# Patient Record
Sex: FEMALE | Race: WHITE | NOT HISPANIC OR LATINO | Employment: FULL TIME | URBAN - METROPOLITAN AREA
[De-identification: names, ages, dates, MRNs, and addresses within clinical notes are randomized per-mention and may not be internally consistent; named-entity substitution may affect disease eponyms.]

---

## 2020-01-20 ENCOUNTER — COMMUNICATION - HEALTHEAST (OUTPATIENT)
Dept: FAMILY MEDICINE | Facility: CLINIC | Age: 40
End: 2020-01-20

## 2020-01-20 ENCOUNTER — OFFICE VISIT - HEALTHEAST (OUTPATIENT)
Dept: FAMILY MEDICINE | Facility: CLINIC | Age: 40
End: 2020-01-20

## 2020-01-20 DIAGNOSIS — Z13.21 ENCOUNTER FOR VITAMIN DEFICIENCY SCREENING: ICD-10-CM

## 2020-01-20 DIAGNOSIS — Z00.00 ROUTINE GENERAL MEDICAL EXAMINATION AT A HEALTH CARE FACILITY: ICD-10-CM

## 2020-01-20 DIAGNOSIS — Z13.220 SCREENING FOR CHOLESTEROL LEVEL: ICD-10-CM

## 2020-01-20 DIAGNOSIS — Z13.0 SCREENING FOR DEFICIENCY ANEMIA: ICD-10-CM

## 2020-01-20 DIAGNOSIS — G43.009 MIGRAINE WITHOUT AURA AND WITHOUT STATUS MIGRAINOSUS, NOT INTRACTABLE: ICD-10-CM

## 2020-01-20 DIAGNOSIS — R74.8 ELEVATED LIVER ENZYMES: ICD-10-CM

## 2020-01-20 DIAGNOSIS — Z13.1 SCREENING FOR DIABETES MELLITUS: ICD-10-CM

## 2020-01-20 DIAGNOSIS — D58.2 ELEVATED HEMOGLOBIN (H): ICD-10-CM

## 2020-01-20 DIAGNOSIS — Z12.4 SCREENING FOR CERVICAL CANCER: ICD-10-CM

## 2020-01-20 DIAGNOSIS — E55.9 VITAMIN D DEFICIENCY: ICD-10-CM

## 2020-01-20 DIAGNOSIS — Z11.3 SCREEN FOR STD (SEXUALLY TRANSMITTED DISEASE): ICD-10-CM

## 2020-01-20 DIAGNOSIS — F33.0 MILD RECURRENT MAJOR DEPRESSION (H): ICD-10-CM

## 2020-01-20 DIAGNOSIS — F41.1 GAD (GENERALIZED ANXIETY DISORDER): ICD-10-CM

## 2020-01-20 DIAGNOSIS — E28.2 PCOS (POLYCYSTIC OVARIAN SYNDROME): ICD-10-CM

## 2020-01-20 LAB
ALBUMIN SERPL-MCNC: 3.9 G/DL (ref 3.5–5)
ALP SERPL-CCNC: 180 U/L (ref 45–120)
ALT SERPL W P-5'-P-CCNC: 178 U/L (ref 0–45)
ANION GAP SERPL CALCULATED.3IONS-SCNC: 10 MMOL/L (ref 5–18)
AST SERPL W P-5'-P-CCNC: 83 U/L (ref 0–40)
BILIRUB SERPL-MCNC: 0.5 MG/DL (ref 0–1)
BUN SERPL-MCNC: 8 MG/DL (ref 8–22)
CALCIUM SERPL-MCNC: 9.3 MG/DL (ref 8.5–10.5)
CHLORIDE BLD-SCNC: 103 MMOL/L (ref 98–107)
CHOLEST SERPL-MCNC: 187 MG/DL
CO2 SERPL-SCNC: 26 MMOL/L (ref 22–31)
CREAT SERPL-MCNC: 0.71 MG/DL (ref 0.6–1.1)
ERYTHROCYTE [DISTWIDTH] IN BLOOD BY AUTOMATED COUNT: 12.4 % (ref 11–14.5)
FASTING STATUS PATIENT QL REPORTED: ABNORMAL
FERRITIN SERPL-MCNC: 86 NG/ML (ref 10–130)
GFR SERPL CREATININE-BSD FRML MDRD: >60 ML/MIN/1.73M2
GLUCOSE BLD-MCNC: 340 MG/DL (ref 70–125)
HBA1C MFR BLD: 12.6 % (ref 3.5–6)
HCT VFR BLD AUTO: 48.6 % (ref 35–47)
HDLC SERPL-MCNC: 38 MG/DL
HGB BLD-MCNC: 16.1 G/DL (ref 12–16)
IRON SATN MFR SERPL: 21 % (ref 20–50)
IRON SERPL-MCNC: 82 UG/DL (ref 42–175)
LDLC SERPL CALC-MCNC: 108 MG/DL
MCH RBC QN AUTO: 28.7 PG (ref 27–34)
MCHC RBC AUTO-ENTMCNC: 33.1 G/DL (ref 32–36)
MCV RBC AUTO: 87 FL (ref 80–100)
PLATELET # BLD AUTO: 311 THOU/UL (ref 140–440)
PMV BLD AUTO: 7.2 FL (ref 7–10)
POTASSIUM BLD-SCNC: 4.2 MMOL/L (ref 3.5–5)
PROT SERPL-MCNC: 7.2 G/DL (ref 6–8)
RBC # BLD AUTO: 5.61 MILL/UL (ref 3.8–5.4)
SODIUM SERPL-SCNC: 139 MMOL/L (ref 136–145)
TIBC SERPL-MCNC: 397 UG/DL (ref 313–563)
TRANSFERRIN SERPL-MCNC: 318 MG/DL (ref 212–360)
TRIGL SERPL-MCNC: 203 MG/DL
WBC: 6.4 THOU/UL (ref 4–11)

## 2020-01-20 ASSESSMENT — ANXIETY QUESTIONNAIRES
2. NOT BEING ABLE TO STOP OR CONTROL WORRYING: NEARLY EVERY DAY
6. BECOMING EASILY ANNOYED OR IRRITABLE: MORE THAN HALF THE DAYS
IF YOU CHECKED OFF ANY PROBLEMS ON THIS QUESTIONNAIRE, HOW DIFFICULT HAVE THESE PROBLEMS MADE IT FOR YOU TO DO YOUR WORK, TAKE CARE OF THINGS AT HOME, OR GET ALONG WITH OTHER PEOPLE: SOMEWHAT DIFFICULT
GAD7 TOTAL SCORE: 16
1. FEELING NERVOUS, ANXIOUS, OR ON EDGE: MORE THAN HALF THE DAYS
3. WORRYING TOO MUCH ABOUT DIFFERENT THINGS: NEARLY EVERY DAY
5. BEING SO RESTLESS THAT IT IS HARD TO SIT STILL: SEVERAL DAYS
4. TROUBLE RELAXING: MORE THAN HALF THE DAYS
7. FEELING AFRAID AS IF SOMETHING AWFUL MIGHT HAPPEN: NEARLY EVERY DAY

## 2020-01-20 ASSESSMENT — MIFFLIN-ST. JEOR: SCORE: 2191.46

## 2020-01-20 ASSESSMENT — PATIENT HEALTH QUESTIONNAIRE - PHQ9: SUM OF ALL RESPONSES TO PHQ QUESTIONS 1-9: 9

## 2020-01-21 LAB
25(OH)D3 SERPL-MCNC: 9 NG/ML (ref 30–80)
HPV SOURCE: NORMAL
HUMAN PAPILLOMA VIRUS 16 DNA: NEGATIVE
HUMAN PAPILLOMA VIRUS 18 DNA: NEGATIVE
HUMAN PAPILLOMA VIRUS FINAL DIAGNOSIS: NORMAL
HUMAN PAPILLOMA VIRUS OTHER HR: NEGATIVE
SPECIMEN DESCRIPTION: NORMAL

## 2020-01-22 ENCOUNTER — OFFICE VISIT - HEALTHEAST (OUTPATIENT)
Dept: FAMILY MEDICINE | Facility: CLINIC | Age: 40
End: 2020-01-22

## 2020-01-22 ENCOUNTER — OFFICE VISIT - HEALTHEAST (OUTPATIENT)
Dept: PHARMACY | Facility: CLINIC | Age: 40
End: 2020-01-22

## 2020-01-22 DIAGNOSIS — D58.2 ELEVATED HEMOGLOBIN (H): ICD-10-CM

## 2020-01-22 DIAGNOSIS — E11.9 TYPE 2 DIABETES MELLITUS WITHOUT COMPLICATION, WITHOUT LONG-TERM CURRENT USE OF INSULIN (H): ICD-10-CM

## 2020-01-22 DIAGNOSIS — R79.89 ELEVATED LFTS: ICD-10-CM

## 2020-01-22 DIAGNOSIS — E11.9 DIABETES MELLITUS, TYPE 2 (H): ICD-10-CM

## 2020-01-22 DIAGNOSIS — E28.2 PCOS (POLYCYSTIC OVARIAN SYNDROME): ICD-10-CM

## 2020-01-22 DIAGNOSIS — Z11.3 SCREEN FOR STD (SEXUALLY TRANSMITTED DISEASE): ICD-10-CM

## 2020-01-22 LAB
CREAT UR-MCNC: 84.1 MG/DL
HAV IGM SERPL QL IA: NEGATIVE
HBV CORE IGM SERPL QL IA: NEGATIVE
HBV SURFACE AG SERPL QL IA: NEGATIVE
HCV AB SERPL QL IA: NEGATIVE
MICROALBUMIN UR-MCNC: 0.98 MG/DL (ref 0–1.99)
MICROALBUMIN/CREAT UR: 11.7 MG/G

## 2020-01-23 LAB
C TRACH DNA SPEC QL PROBE+SIG AMP: NEGATIVE
N GONORRHOEA DNA SPEC QL NAA+PROBE: NEGATIVE

## 2020-01-27 ENCOUNTER — COMMUNICATION - HEALTHEAST (OUTPATIENT)
Dept: FAMILY MEDICINE | Facility: CLINIC | Age: 40
End: 2020-01-27

## 2020-01-28 LAB
BKR LAB AP ABNORMAL BLEEDING: YES
BKR LAB AP BIRTH CONTROL/HORMONES: NORMAL
BKR LAB AP CERVICAL APPEARANCE: NORMAL
BKR LAB AP GYN ADEQUACY: NORMAL
BKR LAB AP GYN INTERPRETATION: NORMAL
BKR LAB AP HPV REFLEX: NORMAL
BKR LAB AP LMP: NORMAL
BKR LAB AP PATIENT STATUS: NORMAL
BKR LAB AP PREVIOUS ABNORMAL: NORMAL
BKR LAB AP PREVIOUS NORMAL: NORMAL
HIGH RISK?: NO
PATH REPORT.COMMENTS IMP SPEC: NORMAL
RESULT FLAG (HE HISTORICAL CONVERSION): NORMAL

## 2020-01-31 ENCOUNTER — COMMUNICATION - HEALTHEAST (OUTPATIENT)
Dept: FAMILY MEDICINE | Facility: CLINIC | Age: 40
End: 2020-01-31

## 2020-01-31 DIAGNOSIS — F41.1 GAD (GENERALIZED ANXIETY DISORDER): ICD-10-CM

## 2020-02-06 ENCOUNTER — OFFICE VISIT - HEALTHEAST (OUTPATIENT)
Dept: FAMILY MEDICINE | Facility: CLINIC | Age: 40
End: 2020-02-06

## 2020-02-06 ENCOUNTER — COMMUNICATION - HEALTHEAST (OUTPATIENT)
Dept: FAMILY MEDICINE | Facility: CLINIC | Age: 40
End: 2020-02-06

## 2020-02-06 ENCOUNTER — COMMUNICATION - HEALTHEAST (OUTPATIENT)
Dept: SCHEDULING | Facility: CLINIC | Age: 40
End: 2020-02-06

## 2020-02-06 DIAGNOSIS — R74.8 ELEVATED LIVER ENZYMES: ICD-10-CM

## 2020-02-06 DIAGNOSIS — N92.1 MENORRHAGIA WITH IRREGULAR CYCLE: ICD-10-CM

## 2020-02-06 LAB
ALBUMIN SERPL-MCNC: 3.7 G/DL (ref 3.5–5)
ALP SERPL-CCNC: 106 U/L (ref 45–120)
ALT SERPL W P-5'-P-CCNC: 105 U/L (ref 0–45)
ANION GAP SERPL CALCULATED.3IONS-SCNC: 11 MMOL/L (ref 5–18)
AST SERPL W P-5'-P-CCNC: 57 U/L (ref 0–40)
BASOPHILS # BLD AUTO: 0.1 THOU/UL (ref 0–0.2)
BASOPHILS NFR BLD AUTO: 1 % (ref 0–2)
BILIRUB SERPL-MCNC: 0.3 MG/DL (ref 0–1)
BUN SERPL-MCNC: 12 MG/DL (ref 8–22)
CALCIUM SERPL-MCNC: 10.1 MG/DL (ref 8.5–10.5)
CHLORIDE BLD-SCNC: 104 MMOL/L (ref 98–107)
CO2 SERPL-SCNC: 26 MMOL/L (ref 22–31)
CREAT SERPL-MCNC: 0.7 MG/DL (ref 0.6–1.1)
EOSINOPHIL # BLD AUTO: 0.4 THOU/UL (ref 0–0.4)
EOSINOPHIL NFR BLD AUTO: 4 % (ref 0–6)
ERYTHROCYTE [DISTWIDTH] IN BLOOD BY AUTOMATED COUNT: 12.7 % (ref 11–14.5)
GFR SERPL CREATININE-BSD FRML MDRD: >60 ML/MIN/1.73M2
GLUCOSE BLD-MCNC: 147 MG/DL (ref 70–125)
HCT VFR BLD AUTO: 41.1 % (ref 35–47)
HGB BLD-MCNC: 13.9 G/DL (ref 12–16)
LYMPHOCYTES # BLD AUTO: 2 THOU/UL (ref 0.8–4.4)
LYMPHOCYTES NFR BLD AUTO: 24 % (ref 20–40)
MCH RBC QN AUTO: 29.3 PG (ref 27–34)
MCHC RBC AUTO-ENTMCNC: 33.8 G/DL (ref 32–36)
MCV RBC AUTO: 87 FL (ref 80–100)
MONOCYTES # BLD AUTO: 0.5 THOU/UL (ref 0–0.9)
MONOCYTES NFR BLD AUTO: 5 % (ref 2–10)
NEUTROPHILS # BLD AUTO: 5.7 THOU/UL (ref 2–7.7)
NEUTROPHILS NFR BLD AUTO: 66 % (ref 50–70)
PLATELET # BLD AUTO: 335 THOU/UL (ref 140–440)
PMV BLD AUTO: 6.8 FL (ref 7–10)
POTASSIUM BLD-SCNC: 4.1 MMOL/L (ref 3.5–5)
PROT SERPL-MCNC: 7 G/DL (ref 6–8)
RBC # BLD AUTO: 4.74 MILL/UL (ref 3.8–5.4)
SODIUM SERPL-SCNC: 141 MMOL/L (ref 136–145)
WBC: 8.6 THOU/UL (ref 4–11)

## 2020-02-06 ASSESSMENT — ANXIETY QUESTIONNAIRES
GAD7 TOTAL SCORE: 0
4. TROUBLE RELAXING: NOT AT ALL
1. FEELING NERVOUS, ANXIOUS, OR ON EDGE: NOT AT ALL
7. FEELING AFRAID AS IF SOMETHING AWFUL MIGHT HAPPEN: NOT AT ALL
3. WORRYING TOO MUCH ABOUT DIFFERENT THINGS: NOT AT ALL
6. BECOMING EASILY ANNOYED OR IRRITABLE: NOT AT ALL
2. NOT BEING ABLE TO STOP OR CONTROL WORRYING: NOT AT ALL
5. BEING SO RESTLESS THAT IT IS HARD TO SIT STILL: NOT AT ALL

## 2020-02-06 ASSESSMENT — PATIENT HEALTH QUESTIONNAIRE - PHQ9: SUM OF ALL RESPONSES TO PHQ QUESTIONS 1-9: 5

## 2020-02-07 ENCOUNTER — COMMUNICATION - HEALTHEAST (OUTPATIENT)
Dept: NURSING | Facility: CLINIC | Age: 40
End: 2020-02-07

## 2020-02-18 ENCOUNTER — AMBULATORY - HEALTHEAST (OUTPATIENT)
Dept: EDUCATION SERVICES | Facility: CLINIC | Age: 40
End: 2020-02-18

## 2020-02-18 DIAGNOSIS — E11.9 CONTROLLED TYPE 2 DIABETES MELLITUS WITHOUT COMPLICATION, WITHOUT LONG-TERM CURRENT USE OF INSULIN (H): ICD-10-CM

## 2020-02-28 ENCOUNTER — COMMUNICATION - HEALTHEAST (OUTPATIENT)
Dept: FAMILY MEDICINE | Facility: CLINIC | Age: 40
End: 2020-02-28

## 2020-02-28 DIAGNOSIS — F41.1 GAD (GENERALIZED ANXIETY DISORDER): ICD-10-CM

## 2020-03-11 ENCOUNTER — OFFICE VISIT - HEALTHEAST (OUTPATIENT)
Dept: FAMILY MEDICINE | Facility: CLINIC | Age: 40
End: 2020-03-11

## 2020-03-11 DIAGNOSIS — Z71.85 IMMUNIZATION COUNSELING: ICD-10-CM

## 2020-03-11 DIAGNOSIS — E11.9 TYPE 2 DIABETES MELLITUS WITHOUT COMPLICATION, WITHOUT LONG-TERM CURRENT USE OF INSULIN (H): ICD-10-CM

## 2020-03-11 DIAGNOSIS — F41.1 GAD (GENERALIZED ANXIETY DISORDER): ICD-10-CM

## 2020-03-11 DIAGNOSIS — E28.2 PCOS (POLYCYSTIC OVARIAN SYNDROME): ICD-10-CM

## 2020-03-18 ENCOUNTER — AMBULATORY - HEALTHEAST (OUTPATIENT)
Dept: FAMILY MEDICINE | Facility: CLINIC | Age: 40
End: 2020-03-18

## 2020-03-18 ENCOUNTER — COMMUNICATION - HEALTHEAST (OUTPATIENT)
Dept: FAMILY MEDICINE | Facility: CLINIC | Age: 40
End: 2020-03-18

## 2020-03-26 ENCOUNTER — COMMUNICATION - HEALTHEAST (OUTPATIENT)
Dept: FAMILY MEDICINE | Facility: CLINIC | Age: 40
End: 2020-03-26

## 2020-03-26 DIAGNOSIS — F41.1 GAD (GENERALIZED ANXIETY DISORDER): ICD-10-CM

## 2020-03-26 DIAGNOSIS — E28.2 PCOS (POLYCYSTIC OVARIAN SYNDROME): ICD-10-CM

## 2020-03-26 DIAGNOSIS — E11.9 TYPE 2 DIABETES MELLITUS WITHOUT COMPLICATION, WITHOUT LONG-TERM CURRENT USE OF INSULIN (H): ICD-10-CM

## 2020-03-30 ENCOUNTER — COMMUNICATION - HEALTHEAST (OUTPATIENT)
Dept: FAMILY MEDICINE | Facility: CLINIC | Age: 40
End: 2020-03-30

## 2020-04-01 ENCOUNTER — OFFICE VISIT - HEALTHEAST (OUTPATIENT)
Dept: FAMILY MEDICINE | Facility: CLINIC | Age: 40
End: 2020-04-01

## 2020-04-01 DIAGNOSIS — F41.1 GAD (GENERALIZED ANXIETY DISORDER): ICD-10-CM

## 2020-04-01 DIAGNOSIS — E11.9 TYPE 2 DIABETES MELLITUS WITHOUT COMPLICATION, WITHOUT LONG-TERM CURRENT USE OF INSULIN (H): ICD-10-CM

## 2020-04-01 DIAGNOSIS — J45.20 MILD INTERMITTENT ASTHMA WITHOUT COMPLICATION: ICD-10-CM

## 2020-04-01 DIAGNOSIS — F33.0 MILD RECURRENT MAJOR DEPRESSION (H): ICD-10-CM

## 2020-04-01 RX ORDER — ALBUTEROL SULFATE 90 UG/1
2 AEROSOL, METERED RESPIRATORY (INHALATION) EVERY 6 HOURS PRN
Qty: 18 G | Refills: 1 | Status: SHIPPED | OUTPATIENT
Start: 2020-04-01 | End: 2022-08-31

## 2020-04-01 ASSESSMENT — ANXIETY QUESTIONNAIRES
IF YOU CHECKED OFF ANY PROBLEMS ON THIS QUESTIONNAIRE, HOW DIFFICULT HAVE THESE PROBLEMS MADE IT FOR YOU TO DO YOUR WORK, TAKE CARE OF THINGS AT HOME, OR GET ALONG WITH OTHER PEOPLE: EXTREMELY DIFFICULT
6. BECOMING EASILY ANNOYED OR IRRITABLE: NEARLY EVERY DAY
2. NOT BEING ABLE TO STOP OR CONTROL WORRYING: NEARLY EVERY DAY
GAD7 TOTAL SCORE: 19
3. WORRYING TOO MUCH ABOUT DIFFERENT THINGS: NEARLY EVERY DAY
4. TROUBLE RELAXING: NEARLY EVERY DAY
1. FEELING NERVOUS, ANXIOUS, OR ON EDGE: NEARLY EVERY DAY
7. FEELING AFRAID AS IF SOMETHING AWFUL MIGHT HAPPEN: NEARLY EVERY DAY
5. BEING SO RESTLESS THAT IT IS HARD TO SIT STILL: SEVERAL DAYS

## 2020-04-01 ASSESSMENT — PATIENT HEALTH QUESTIONNAIRE - PHQ9: SUM OF ALL RESPONSES TO PHQ QUESTIONS 1-9: 13

## 2020-04-22 ENCOUNTER — OFFICE VISIT - HEALTHEAST (OUTPATIENT)
Dept: FAMILY MEDICINE | Facility: CLINIC | Age: 40
End: 2020-04-22

## 2020-04-22 DIAGNOSIS — E11.9 TYPE 2 DIABETES MELLITUS WITHOUT COMPLICATION, WITHOUT LONG-TERM CURRENT USE OF INSULIN (H): ICD-10-CM

## 2020-04-22 DIAGNOSIS — F41.1 GAD (GENERALIZED ANXIETY DISORDER): ICD-10-CM

## 2020-04-22 ASSESSMENT — ANXIETY QUESTIONNAIRES
6. BECOMING EASILY ANNOYED OR IRRITABLE: SEVERAL DAYS
3. WORRYING TOO MUCH ABOUT DIFFERENT THINGS: SEVERAL DAYS
2. NOT BEING ABLE TO STOP OR CONTROL WORRYING: SEVERAL DAYS
1. FEELING NERVOUS, ANXIOUS, OR ON EDGE: SEVERAL DAYS
5. BEING SO RESTLESS THAT IT IS HARD TO SIT STILL: NOT AT ALL
7. FEELING AFRAID AS IF SOMETHING AWFUL MIGHT HAPPEN: SEVERAL DAYS
GAD7 TOTAL SCORE: 6
IF YOU CHECKED OFF ANY PROBLEMS ON THIS QUESTIONNAIRE, HOW DIFFICULT HAVE THESE PROBLEMS MADE IT FOR YOU TO DO YOUR WORK, TAKE CARE OF THINGS AT HOME, OR GET ALONG WITH OTHER PEOPLE: SOMEWHAT DIFFICULT
4. TROUBLE RELAXING: SEVERAL DAYS

## 2020-04-22 ASSESSMENT — PATIENT HEALTH QUESTIONNAIRE - PHQ9: SUM OF ALL RESPONSES TO PHQ QUESTIONS 1-9: 9

## 2020-05-21 ENCOUNTER — COMMUNICATION - HEALTHEAST (OUTPATIENT)
Dept: FAMILY MEDICINE | Facility: CLINIC | Age: 40
End: 2020-05-21

## 2020-05-21 DIAGNOSIS — F41.1 GAD (GENERALIZED ANXIETY DISORDER): ICD-10-CM

## 2020-06-20 ENCOUNTER — COMMUNICATION - HEALTHEAST (OUTPATIENT)
Dept: FAMILY MEDICINE | Facility: CLINIC | Age: 40
End: 2020-06-20

## 2020-06-20 DIAGNOSIS — E11.9 TYPE 2 DIABETES MELLITUS WITHOUT COMPLICATION, WITHOUT LONG-TERM CURRENT USE OF INSULIN (H): ICD-10-CM

## 2020-06-26 ENCOUNTER — COMMUNICATION - HEALTHEAST (OUTPATIENT)
Dept: FAMILY MEDICINE | Facility: CLINIC | Age: 40
End: 2020-06-26

## 2020-07-02 ENCOUNTER — OFFICE VISIT - HEALTHEAST (OUTPATIENT)
Dept: FAMILY MEDICINE | Facility: CLINIC | Age: 40
End: 2020-07-02

## 2020-07-02 DIAGNOSIS — J45.20 MILD INTERMITTENT ASTHMA WITHOUT COMPLICATION: ICD-10-CM

## 2020-07-02 DIAGNOSIS — E11.9 TYPE 2 DIABETES MELLITUS WITHOUT COMPLICATION, WITHOUT LONG-TERM CURRENT USE OF INSULIN (H): ICD-10-CM

## 2020-07-02 DIAGNOSIS — F41.1 GAD (GENERALIZED ANXIETY DISORDER): ICD-10-CM

## 2020-07-02 ASSESSMENT — ANXIETY QUESTIONNAIRES
4. TROUBLE RELAXING: SEVERAL DAYS
2. NOT BEING ABLE TO STOP OR CONTROL WORRYING: MORE THAN HALF THE DAYS
1. FEELING NERVOUS, ANXIOUS, OR ON EDGE: MORE THAN HALF THE DAYS
3. WORRYING TOO MUCH ABOUT DIFFERENT THINGS: MORE THAN HALF THE DAYS
5. BEING SO RESTLESS THAT IT IS HARD TO SIT STILL: NOT AT ALL
GAD7 TOTAL SCORE: 11
IF YOU CHECKED OFF ANY PROBLEMS ON THIS QUESTIONNAIRE, HOW DIFFICULT HAVE THESE PROBLEMS MADE IT FOR YOU TO DO YOUR WORK, TAKE CARE OF THINGS AT HOME, OR GET ALONG WITH OTHER PEOPLE: EXTREMELY DIFFICULT
6. BECOMING EASILY ANNOYED OR IRRITABLE: MORE THAN HALF THE DAYS
7. FEELING AFRAID AS IF SOMETHING AWFUL MIGHT HAPPEN: MORE THAN HALF THE DAYS

## 2020-07-02 ASSESSMENT — PATIENT HEALTH QUESTIONNAIRE - PHQ9: SUM OF ALL RESPONSES TO PHQ QUESTIONS 1-9: 13

## 2020-07-22 ENCOUNTER — COMMUNICATION - HEALTHEAST (OUTPATIENT)
Dept: FAMILY MEDICINE | Facility: CLINIC | Age: 40
End: 2020-07-22

## 2020-07-22 DIAGNOSIS — E11.9 TYPE 2 DIABETES MELLITUS WITHOUT COMPLICATION, WITHOUT LONG-TERM CURRENT USE OF INSULIN (H): ICD-10-CM

## 2020-09-20 ENCOUNTER — COMMUNICATION - HEALTHEAST (OUTPATIENT)
Dept: FAMILY MEDICINE | Facility: CLINIC | Age: 40
End: 2020-09-20

## 2020-09-20 DIAGNOSIS — F33.0 MILD RECURRENT MAJOR DEPRESSION (H): ICD-10-CM

## 2020-12-28 ENCOUNTER — COMMUNICATION - HEALTHEAST (OUTPATIENT)
Dept: FAMILY MEDICINE | Facility: CLINIC | Age: 40
End: 2020-12-28

## 2020-12-28 DIAGNOSIS — F41.1 GAD (GENERALIZED ANXIETY DISORDER): ICD-10-CM

## 2021-01-01 ENCOUNTER — COMMUNICATION - HEALTHEAST (OUTPATIENT)
Dept: FAMILY MEDICINE | Facility: CLINIC | Age: 41
End: 2021-01-01

## 2021-01-01 DIAGNOSIS — E11.9 TYPE 2 DIABETES MELLITUS WITHOUT COMPLICATION, WITHOUT LONG-TERM CURRENT USE OF INSULIN (H): ICD-10-CM

## 2021-01-03 ENCOUNTER — COMMUNICATION - HEALTHEAST (OUTPATIENT)
Dept: FAMILY MEDICINE | Facility: CLINIC | Age: 41
End: 2021-01-03

## 2021-01-03 DIAGNOSIS — E11.9 TYPE 2 DIABETES MELLITUS WITHOUT COMPLICATION, WITHOUT LONG-TERM CURRENT USE OF INSULIN (H): ICD-10-CM

## 2021-01-04 ENCOUNTER — COMMUNICATION - HEALTHEAST (OUTPATIENT)
Dept: FAMILY MEDICINE | Facility: CLINIC | Age: 41
End: 2021-01-04

## 2021-01-04 DIAGNOSIS — E11.9 TYPE 2 DIABETES MELLITUS WITHOUT COMPLICATION, WITHOUT LONG-TERM CURRENT USE OF INSULIN (H): ICD-10-CM

## 2021-01-21 ENCOUNTER — AMBULATORY - HEALTHEAST (OUTPATIENT)
Dept: FAMILY MEDICINE | Facility: CLINIC | Age: 41
End: 2021-01-21

## 2021-01-21 ENCOUNTER — OFFICE VISIT - HEALTHEAST (OUTPATIENT)
Dept: FAMILY MEDICINE | Facility: CLINIC | Age: 41
End: 2021-01-21

## 2021-01-21 DIAGNOSIS — Z31.69 PRE-CONCEPTION COUNSELING: ICD-10-CM

## 2021-01-21 DIAGNOSIS — F41.1 GAD (GENERALIZED ANXIETY DISORDER): ICD-10-CM

## 2021-01-21 DIAGNOSIS — E66.01 MORBID OBESITY (H): ICD-10-CM

## 2021-01-21 DIAGNOSIS — R74.8 ELEVATED LIVER ENZYMES: ICD-10-CM

## 2021-01-21 DIAGNOSIS — E11.9 TYPE 2 DIABETES MELLITUS WITHOUT COMPLICATION, WITHOUT LONG-TERM CURRENT USE OF INSULIN (H): ICD-10-CM

## 2021-01-21 DIAGNOSIS — F33.0 MILD RECURRENT MAJOR DEPRESSION (H): ICD-10-CM

## 2021-01-21 LAB
ALBUMIN SERPL-MCNC: 4.1 G/DL (ref 3.5–5)
ALP SERPL-CCNC: 129 U/L (ref 45–120)
ALT SERPL W P-5'-P-CCNC: 256 U/L (ref 0–45)
ANION GAP SERPL CALCULATED.3IONS-SCNC: 13 MMOL/L (ref 5–18)
AST SERPL W P-5'-P-CCNC: 109 U/L (ref 0–40)
BILIRUB SERPL-MCNC: 0.3 MG/DL (ref 0–1)
BUN SERPL-MCNC: 17 MG/DL (ref 8–22)
CALCIUM SERPL-MCNC: 9.4 MG/DL (ref 8.5–10.5)
CHLORIDE BLD-SCNC: 103 MMOL/L (ref 98–107)
CO2 SERPL-SCNC: 25 MMOL/L (ref 22–31)
CREAT SERPL-MCNC: 0.72 MG/DL (ref 0.6–1.1)
CREAT UR-MCNC: 100.9 MG/DL
GFR SERPL CREATININE-BSD FRML MDRD: >60 ML/MIN/1.73M2
GLUCOSE BLD-MCNC: 188 MG/DL (ref 70–125)
HBA1C MFR BLD: 8.6 %
MICROALBUMIN UR-MCNC: 1.36 MG/DL (ref 0–1.99)
MICROALBUMIN/CREAT UR: 13.5 MG/G
POTASSIUM BLD-SCNC: 4.7 MMOL/L (ref 3.5–5)
PROT SERPL-MCNC: 7.5 G/DL (ref 6–8)
SODIUM SERPL-SCNC: 141 MMOL/L (ref 136–145)
TSH SERPL DL<=0.005 MIU/L-ACNC: 2.96 UIU/ML (ref 0.3–5)

## 2021-01-21 RX ORDER — LANCETS 30 GAUGE
EACH MISCELLANEOUS
Qty: 100 EACH | Refills: 11 | Status: SHIPPED | OUTPATIENT
Start: 2021-01-21 | End: 2022-10-28

## 2021-01-21 ASSESSMENT — MIFFLIN-ST. JEOR: SCORE: 2198.26

## 2021-01-21 ASSESSMENT — PATIENT HEALTH QUESTIONNAIRE - PHQ9: SUM OF ALL RESPONSES TO PHQ QUESTIONS 1-9: 0

## 2021-01-27 ENCOUNTER — COMMUNICATION - HEALTHEAST (OUTPATIENT)
Dept: FAMILY MEDICINE | Facility: CLINIC | Age: 41
End: 2021-01-27

## 2021-01-27 DIAGNOSIS — E11.9 TYPE 2 DIABETES MELLITUS WITHOUT COMPLICATION, WITHOUT LONG-TERM CURRENT USE OF INSULIN (H): ICD-10-CM

## 2021-01-28 ENCOUNTER — HOSPITAL ENCOUNTER (OUTPATIENT)
Dept: ULTRASOUND IMAGING | Facility: CLINIC | Age: 41
Discharge: HOME | End: 2021-01-28
Attending: NURSE PRACTITIONER

## 2021-01-28 DIAGNOSIS — R74.8 ELEVATED LIVER ENZYMES: ICD-10-CM

## 2021-01-28 RX ORDER — GLUCOSAMINE HCL/CHONDROITIN SU 500-400 MG
CAPSULE ORAL
Qty: 100 STRIP | Refills: 11 | Status: SHIPPED | OUTPATIENT
Start: 2021-01-28 | End: 2023-04-05

## 2021-02-01 ENCOUNTER — OFFICE VISIT - HEALTHEAST (OUTPATIENT)
Dept: INTERNAL MEDICINE | Facility: CLINIC | Age: 41
End: 2021-02-01

## 2021-02-01 ENCOUNTER — COMMUNICATION - HEALTHEAST (OUTPATIENT)
Dept: FAMILY MEDICINE | Facility: CLINIC | Age: 41
End: 2021-02-01

## 2021-02-01 DIAGNOSIS — J02.9 PHARYNGITIS, UNSPECIFIED ETIOLOGY: ICD-10-CM

## 2021-02-02 ENCOUNTER — COMMUNICATION - HEALTHEAST (OUTPATIENT)
Dept: FAMILY MEDICINE | Facility: CLINIC | Age: 41
End: 2021-02-02

## 2021-02-02 DIAGNOSIS — E11.9 TYPE 2 DIABETES MELLITUS WITHOUT COMPLICATION, WITHOUT LONG-TERM CURRENT USE OF INSULIN (H): ICD-10-CM

## 2021-02-12 ENCOUNTER — COMMUNICATION - HEALTHEAST (OUTPATIENT)
Dept: FAMILY MEDICINE | Facility: CLINIC | Age: 41
End: 2021-02-12

## 2021-02-17 ENCOUNTER — COMMUNICATION - HEALTHEAST (OUTPATIENT)
Dept: INTERNAL MEDICINE | Facility: CLINIC | Age: 41
End: 2021-02-17

## 2021-02-17 ENCOUNTER — OFFICE VISIT - HEALTHEAST (OUTPATIENT)
Dept: FAMILY MEDICINE | Facility: CLINIC | Age: 41
End: 2021-02-17

## 2021-02-17 DIAGNOSIS — R21 RASH AND NONSPECIFIC SKIN ERUPTION: ICD-10-CM

## 2021-02-17 DIAGNOSIS — E66.01 MORBID OBESITY (H): ICD-10-CM

## 2021-02-17 DIAGNOSIS — K76.0 HEPATIC STEATOSIS: ICD-10-CM

## 2021-02-17 DIAGNOSIS — E11.9 TYPE 2 DIABETES MELLITUS WITHOUT COMPLICATION, WITHOUT LONG-TERM CURRENT USE OF INSULIN (H): ICD-10-CM

## 2021-02-17 RX ORDER — PHENTERMINE HYDROCHLORIDE 37.5 MG/1
37.5 TABLET ORAL
Qty: 30 TABLET | Refills: 1 | Status: SHIPPED | OUTPATIENT
Start: 2021-02-17 | End: 2022-01-10

## 2021-02-18 LAB
VARICELLA ZOSTER DNA COMMENT: NORMAL
VZV DNA SPEC QL NAA+PROBE: NORMAL
VZV PCR SPECIMEN: NORMAL

## 2021-02-19 ENCOUNTER — COMMUNICATION - HEALTHEAST (OUTPATIENT)
Dept: FAMILY MEDICINE | Facility: CLINIC | Age: 41
End: 2021-02-19

## 2021-02-19 LAB — BACTERIA SPEC CULT: NORMAL

## 2021-02-20 ENCOUNTER — COMMUNICATION - HEALTHEAST (OUTPATIENT)
Dept: FAMILY MEDICINE | Facility: CLINIC | Age: 41
End: 2021-02-20

## 2021-03-30 ENCOUNTER — COMMUNICATION - HEALTHEAST (OUTPATIENT)
Dept: FAMILY MEDICINE | Facility: CLINIC | Age: 41
End: 2021-03-30

## 2021-03-30 DIAGNOSIS — E11.9 TYPE 2 DIABETES MELLITUS WITHOUT COMPLICATION, WITHOUT LONG-TERM CURRENT USE OF INSULIN (H): ICD-10-CM

## 2021-04-01 ENCOUNTER — COMMUNICATION - HEALTHEAST (OUTPATIENT)
Dept: FAMILY MEDICINE | Facility: CLINIC | Age: 41
End: 2021-04-01

## 2021-04-01 DIAGNOSIS — E11.9 TYPE 2 DIABETES MELLITUS WITHOUT COMPLICATION, WITHOUT LONG-TERM CURRENT USE OF INSULIN (H): ICD-10-CM

## 2021-04-02 RX ORDER — INSULIN DETEMIR 100 [IU]/ML
15 INJECTION, SOLUTION SUBCUTANEOUS AT BEDTIME
Qty: 14 ML | Refills: 1 | Status: SHIPPED | OUTPATIENT
Start: 2021-04-02 | End: 2021-09-07

## 2021-04-05 ENCOUNTER — OFFICE VISIT - HEALTHEAST (OUTPATIENT)
Dept: FAMILY MEDICINE | Facility: CLINIC | Age: 41
End: 2021-04-05

## 2021-04-05 DIAGNOSIS — H66.002 NON-RECURRENT ACUTE SUPPURATIVE OTITIS MEDIA OF LEFT EAR WITHOUT SPONTANEOUS RUPTURE OF TYMPANIC MEMBRANE: ICD-10-CM

## 2021-04-05 DIAGNOSIS — H92.02 LEFT EAR PAIN: ICD-10-CM

## 2021-04-05 ASSESSMENT — MIFFLIN-ST. JEOR: SCORE: 2218.67

## 2021-04-08 ENCOUNTER — OFFICE VISIT - HEALTHEAST (OUTPATIENT)
Dept: FAMILY MEDICINE | Facility: CLINIC | Age: 41
End: 2021-04-08

## 2021-04-08 DIAGNOSIS — R51.9 ACUTE NONINTRACTABLE HEADACHE, UNSPECIFIED HEADACHE TYPE: ICD-10-CM

## 2021-04-09 ENCOUNTER — COMMUNICATION - HEALTHEAST (OUTPATIENT)
Dept: FAMILY MEDICINE | Facility: CLINIC | Age: 41
End: 2021-04-09

## 2021-04-09 DIAGNOSIS — E11.9 TYPE 2 DIABETES MELLITUS WITHOUT COMPLICATION, WITHOUT LONG-TERM CURRENT USE OF INSULIN (H): ICD-10-CM

## 2021-04-12 RX ORDER — METFORMIN HCL 500 MG
TABLET, EXTENDED RELEASE 24 HR ORAL
Qty: 360 TABLET | Refills: 2 | Status: SHIPPED | OUTPATIENT
Start: 2021-04-12 | End: 2021-12-22

## 2021-04-19 ENCOUNTER — OFFICE VISIT - HEALTHEAST (OUTPATIENT)
Dept: FAMILY MEDICINE | Facility: CLINIC | Age: 41
End: 2021-04-19

## 2021-04-19 DIAGNOSIS — H81.13 BENIGN PAROXYSMAL POSITIONAL VERTIGO DUE TO BILATERAL VESTIBULAR DISORDER: ICD-10-CM

## 2021-04-19 DIAGNOSIS — E11.9 TYPE 2 DIABETES MELLITUS WITHOUT COMPLICATION, WITHOUT LONG-TERM CURRENT USE OF INSULIN (H): ICD-10-CM

## 2021-04-19 DIAGNOSIS — H66.002 ACUTE SUPPURATIVE OTITIS MEDIA OF LEFT EAR WITHOUT SPONTANEOUS RUPTURE OF TYMPANIC MEMBRANE, RECURRENCE NOT SPECIFIED: ICD-10-CM

## 2021-04-19 RX ORDER — MECLIZINE HYDROCHLORIDE 25 MG/1
25 TABLET ORAL 3 TIMES DAILY PRN
Qty: 30 TABLET | Refills: 1 | Status: SHIPPED | OUTPATIENT
Start: 2021-04-19 | End: 2022-03-16

## 2021-04-19 ASSESSMENT — MIFFLIN-ST. JEOR: SCORE: 2223.21

## 2021-05-26 ASSESSMENT — PATIENT HEALTH QUESTIONNAIRE - PHQ9
SUM OF ALL RESPONSES TO PHQ QUESTIONS 1-9: 5
SUM OF ALL RESPONSES TO PHQ QUESTIONS 1-9: 13
SUM OF ALL RESPONSES TO PHQ QUESTIONS 1-9: 9

## 2021-05-27 ASSESSMENT — PATIENT HEALTH QUESTIONNAIRE - PHQ9
SUM OF ALL RESPONSES TO PHQ QUESTIONS 1-9: 9
SUM OF ALL RESPONSES TO PHQ QUESTIONS 1-9: 0
SUM OF ALL RESPONSES TO PHQ QUESTIONS 1-9: 13

## 2021-05-28 ASSESSMENT — ANXIETY QUESTIONNAIRES
GAD7 TOTAL SCORE: 0
GAD7 TOTAL SCORE: 19
GAD7 TOTAL SCORE: 16
GAD7 TOTAL SCORE: 6
GAD7 TOTAL SCORE: 11

## 2021-05-28 ASSESSMENT — ASTHMA QUESTIONNAIRES
ACT_TOTALSCORE: 25
ACT_TOTALSCORE: 22

## 2021-05-29 ENCOUNTER — RECORDS - HEALTHEAST (OUTPATIENT)
Dept: ADMINISTRATIVE | Facility: CLINIC | Age: 41
End: 2021-05-29

## 2021-06-02 ENCOUNTER — RECORDS - HEALTHEAST (OUTPATIENT)
Dept: ADMINISTRATIVE | Facility: CLINIC | Age: 41
End: 2021-06-02

## 2021-06-04 VITALS — BODY MASS INDEX: 47.3 KG/M2 | WEIGHT: 293 LBS

## 2021-06-04 VITALS
DIASTOLIC BLOOD PRESSURE: 82 MMHG | BODY MASS INDEX: 47.75 KG/M2 | WEIGHT: 293 LBS | HEART RATE: 80 BPM | SYSTOLIC BLOOD PRESSURE: 134 MMHG

## 2021-06-04 VITALS
HEART RATE: 82 BPM | OXYGEN SATURATION: 98 % | SYSTOLIC BLOOD PRESSURE: 130 MMHG | DIASTOLIC BLOOD PRESSURE: 80 MMHG | BODY MASS INDEX: 46.71 KG/M2 | WEIGHT: 293 LBS

## 2021-06-04 VITALS
WEIGHT: 293 LBS | HEIGHT: 69 IN | SYSTOLIC BLOOD PRESSURE: 116 MMHG | HEART RATE: 84 BPM | DIASTOLIC BLOOD PRESSURE: 84 MMHG | BODY MASS INDEX: 43.4 KG/M2

## 2021-06-04 VITALS
SYSTOLIC BLOOD PRESSURE: 128 MMHG | BODY MASS INDEX: 47.33 KG/M2 | HEART RATE: 60 BPM | DIASTOLIC BLOOD PRESSURE: 74 MMHG | WEIGHT: 293 LBS

## 2021-06-05 VITALS
WEIGHT: 293 LBS | OXYGEN SATURATION: 96 % | BODY MASS INDEX: 43.4 KG/M2 | HEART RATE: 70 BPM | SYSTOLIC BLOOD PRESSURE: 128 MMHG | HEIGHT: 69 IN | DIASTOLIC BLOOD PRESSURE: 80 MMHG

## 2021-06-05 VITALS
DIASTOLIC BLOOD PRESSURE: 70 MMHG | OXYGEN SATURATION: 97 % | SYSTOLIC BLOOD PRESSURE: 118 MMHG | WEIGHT: 293 LBS | TEMPERATURE: 98.4 F | HEART RATE: 104 BPM | BODY MASS INDEX: 47.6 KG/M2

## 2021-06-05 VITALS
DIASTOLIC BLOOD PRESSURE: 74 MMHG | HEIGHT: 69 IN | WEIGHT: 293 LBS | HEART RATE: 94 BPM | SYSTOLIC BLOOD PRESSURE: 126 MMHG | BODY MASS INDEX: 43.4 KG/M2 | OXYGEN SATURATION: 96 %

## 2021-06-05 VITALS
TEMPERATURE: 98.1 F | HEART RATE: 82 BPM | WEIGHT: 293 LBS | BODY MASS INDEX: 43.4 KG/M2 | RESPIRATION RATE: 18 BRPM | SYSTOLIC BLOOD PRESSURE: 126 MMHG | DIASTOLIC BLOOD PRESSURE: 82 MMHG | HEIGHT: 69 IN | OXYGEN SATURATION: 97 %

## 2021-06-05 VITALS
SYSTOLIC BLOOD PRESSURE: 142 MMHG | DIASTOLIC BLOOD PRESSURE: 80 MMHG | BODY MASS INDEX: 47.9 KG/M2 | HEART RATE: 75 BPM | WEIGHT: 293 LBS | OXYGEN SATURATION: 98 % | TEMPERATURE: 96.3 F

## 2021-06-05 NOTE — PROGRESS NOTES
OV   2/6/2020  Assessment & Plan:        1. Menorrhagia with irregular cycle  HM1(CBC and Differential)    HM1 (CBC with Diff)    Ambulatory referral to Obstetrics / Gynecology   2. Elevated liver enzymes  Comprehensive Metabolic Panel          We reviewed the likely/potential etiology(ies) for her heavy menstrual symptoms and we will check labs as notedand we reviewed indications for urgent evaluation. I will recheck liver and kidney functions for follow up of elevated liver enzymes. I will send a referral for OB/Gyn to discuss possible ablation or other treatment options. We reviewed use of OTC analgesics as well as increased fluids and rest, and she will call or return to clinic with any ongoing or worsening symptoms.            Subjective:      Ambar Rodriguez is a 39 y.o. female who presents for evaluation of abnormal periods.    Dysmenorrhea/ Premenstrual Syndrome       Patient complains of menstrual symptoms. She has been having bleeding since 1/18/20, and it started light, and got heavier 4 days ago. She has been changing a pad and tampon every hour and a half or so. She has been feeling a bit dizzy intermittenty. The last cycle prior to this one was in November. She started taking OCPs on 1/20/20 for cycle control, but bleeding has gotten heavy despite that.        She denies uti symptoms, fever, chills, or respiratory symptoms. She has been on Metformin for a couple weeks and has been tolerating it well. She does note that she had elevated liver enzymes when she had labs drawn a few wks ago.     Menstrual History:  OB History    No obstetric history on file.        Patient's last menstrual period was 01/18/2020 (exact date).       The following portions of the patient's history were reviewed and updated as appropriate: allergies, current medications and problem list    Review of Systems  A 12 point comprehensive review of systems was negative except as noted.       Objective:       /74 (Patient  Site: Right Arm, Patient Position: Sitting, Cuff Size: Adult Large)   Pulse 60   Wt (!) 318 lb 3.2 oz (144.3 kg)   LMP 01/18/2020 (Exact Date)   BMI 47.33 kg/m    Physical Exam:  General: Alert, cooperative, no distress  Head: Normocephalic, without obvious abnormality, atraumatic  Eyes: PERRL, conjunctiva/corneas clear, EOM's intact  Throat: Lips, mucosa, and tongue normal; teeth and gums normal  Neck: Supple, symmetrical, trachea midline, no  Back: Symmetric, no CVA tenderness  Lungs: Clear to auscultation bilaterally, respirations unlabored  Heart: Regular rate and rhythm,  no murmur, rub, or gallop,   Abdomen: Soft, non-tender, no masses, no organomegaly  Pelvic:Not examined  Extremities: Extremities normal, atraumatic, no cyanosis or edema  Skin: Skin color, texture, turgor normal, no rashes or lesions  Lymph nodes: Cervical, supraclavicular, and axillary nodes normal  Neurologic: Normal               Results for orders placed or performed in visit on 02/06/20   Comprehensive Metabolic Panel   Result Value Ref Range    Sodium 141 136 - 145 mmol/L    Potassium 4.1 3.5 - 5.0 mmol/L    Chloride 104 98 - 107 mmol/L    CO2 26 22 - 31 mmol/L    Anion Gap, Calculation 11 5 - 18 mmol/L    Glucose 147 (H) 70 - 125 mg/dL    BUN 12 8 - 22 mg/dL    Creatinine 0.70 0.60 - 1.10 mg/dL    GFR MDRD Af Amer >60 >60 mL/min/1.73m2    GFR MDRD Non Af Amer >60 >60 mL/min/1.73m2    Bilirubin, Total 0.3 0.0 - 1.0 mg/dL    Calcium 10.1 8.5 - 10.5 mg/dL    Protein, Total 7.0 6.0 - 8.0 g/dL    Albumin 3.7 3.5 - 5.0 g/dL    Alkaline Phosphatase 106 45 - 120 U/L    AST 57 (H) 0 - 40 U/L     (H) 0 - 45 U/L   HM1 (CBC with Diff)   Result Value Ref Range    WBC 8.6 4.0 - 11.0 thou/uL    RBC 4.74 3.80 - 5.40 mill/uL    Hemoglobin 13.9 12.0 - 16.0 g/dL    Hematocrit 41.1 35.0 - 47.0 %    MCV 87 80 - 100 fL    MCH 29.3 27.0 - 34.0 pg    MCHC 33.8 32.0 - 36.0 g/dL    RDW 12.7 11.0 - 14.5 %    Platelets 335 140 - 440 thou/uL    MPV 6.8  (L) 7.0 - 10.0 fL    Neutrophils % 66 50 - 70 %    Lymphocytes % 24 20 - 40 %    Monocytes % 5 2 - 10 %    Eosinophils % 4 0 - 6 %    Basophils % 1 0 - 2 %    Neutrophils Absolute 5.7 2.0 - 7.7 thou/uL    Lymphocytes Absolute 2.0 0.8 - 4.4 thou/uL    Monocytes Absolute 0.5 0.0 - 0.9 thou/uL    Eosinophils Absolute 0.4 0.0 - 0.4 thou/uL    Basophils Absolute 0.1 0.0 - 0.2 thou/uL

## 2021-06-05 NOTE — PATIENT INSTRUCTIONS - HE
Recommendations from today's PharmD medication management visit                                                       1.Titrate up on metformin. Add one tablet each 3-7 days until up to 4 tablets.     2. Test blood sugars once daily, various times.   Blood sugar goals:   Fasting    2 hours after a meal <180    Next pharmacist visit: 2-3 weeks MyChart    It was great to speak with you today.  I value your experience and would be very thankful for your time with providing feedback on our clinic survey. You may receive a survey via email or text message in the next few days.     My Pharmacist's contact information:                                                       It was a pleasure seeing you today to discuss your medications!  Please feel free to contact me with any questions or concerns you have. I look forward to seeing you again to help you get the most benefit from your medications!    To reschedule your PharmD appointment, please call the clinic directly or you may call the [facility] scheduling line at [phone] or toll-free at [phone]:   [facility] (available for appointments Mondays and Thursdays)  [facility] (available for appointments Tuesday, Wednesday & Friday)     Jesika Hernandez, Ivonne, BCACP  Medication Therapy Management Pharmacist  [facility]

## 2021-06-05 NOTE — PROGRESS NOTES
MTM Consult Encounter    Ambar Rodriguez is a 39 y.o. female referred for a clinical pharmacist consult from Maya Ovalle NP for diabetes    Discussion:     Recently diagnosed with diabetes. Met with Maya today to discuss further. Patient also has PCOS therefore plan is to start metformin and if no improvement, then consider additional medication in the future. Patient was on metformin in the past for PCOS and had no issues. Will be titrating up.     Of note, patient is getting  Sept 2021 and hopes to have children after.     Tests BG 0 times daily. Will have patient start checking blood sugars to see if she is responding to metformin. If no response when at 4 tablets, then will have her come in for additional medication.   Reviewd how to use OneTouch Verio glucometer. Will have her start with testing once daily at various times. Reviewed blood sugar goals. I will reach out to her in 2-3 weeks on MyChart to see if she is responding.   Checked blood sugar in clinic ~1.5 hours since eating breakfast and it was 384  Last A1c checked 1/20/20 = 12.6%.   Hyperglycemia symptoms: fatigue, blurry vision, polydipsia, polyuria.   Microalbumin checked today, results pending  Is not on statin or aspirin due to age. Last lipids checked 1/20/20    Plan:  1. Start OneTouch Verio glucometer. Supplies sent. Check once daily    Follow up:   2-3 weeks Maria Elena Hernandez, Pharm.D., [facility]  Medication Therapy Management Pharmacist  [facility]      Current Outpatient Medications   Medication Sig Dispense Refill     [START ON 1/23/2020] cholecalciferol, vitamin D3, 1,250 mcg (50,000 unit) capsule Take 50,000 Units by mouth 2 (two) times a week for 16 doses. 16 capsule 0     escitalopram oxalate (LEXAPRO) 20 MG tablet Take 20 mg by mouth daily.       metFORMIN (GLUCOPHAGE XR) 500 MG 24 hr tablet One tablet daily at breakfast.  Increase by one tablet every three days until taking four tablets daily. 120  tablet 1     norethindrone-ethinyl estradiol (NECON) 0.5-35 mg-mcg per tablet Take 1 tablet by mouth daily. 3 Package 3     No current facility-administered medications for this visit.

## 2021-06-05 NOTE — TELEPHONE ENCOUNTER
Spoke with patient. Discussed type 2 diabetes diagnosis. Recommended visit with myself and MTM and will schedule for tomorrow.

## 2021-06-05 NOTE — TELEPHONE ENCOUNTER
Patient Returning Call  Reason for call:  Returning clinic call.  Information relayed to patient:  Patient was read the note entry and given the phone number for [facility]  Patient has additional questions:  No  If YES, what are your questions/concerns:  NA  Okay to leave a detailed message?: No call back needed

## 2021-06-05 NOTE — PROGRESS NOTES
FEMALE PREVENTIVE EXAM    Assessment & Plan   1. Routine general medical examination at a health care facility  Fasting labs and pap, if normal repeat in five years.     2. PCOS (polycystic ovarian syndrome)  Reviewed recommendations for diet, exercise, weight loss.  Is bothered by occasional prolonged bleeding and discussed OCPs versus metformin and possibly combination.  H/o stroke in her mother at age 60, no other risk factors for use of hormonal contraceptives.  Will start on OCP and depending on lab results may add on metformin as well.  FMH type 2 diabetes in both parents, she is aware of her increased risk, counseled on annual screening.   - Glycosylated Hemoglobin A1c  - norethindrone-ethinyl estradiol (NECON) 0.5-35 mg-mcg per tablet; Take 1 tablet by mouth daily.  Dispense: 3 Package; Refill: 3    3. AMANDA (generalized anxiety disorder)  Has noted some improvement with starting Lexapro though it is still too early to see the full effect of this.  We will plan to recheck in 3 weeks when she should be able to see the full effect.  We discussed possible medication change versus augmentation if she feels anxiety is still poorly controlled.  Offered either in person visit or a visit.  She is interested in establishing with a therapist as well and referral was placed today.  - Ambulatory referral to Psychology    4. Mild recurrent major depression (H)  As above.  Depression symptoms currently much more mild than anxiety though she does have a history of recurrent depression.  Will wait to see how she responds to the Lexapro.    5. Migraine without aura and without status migrainosus, not intractable    6. Screening for cervical cancer  - Gynecologic Cytology (PAP Smear)    7. Screen for STD (sexually transmitted disease)  - Chlamydia trachomatis & Neisseria gonorrhoeae, Amplified Detection    8. Screening for cholesterol level  - Lipid Cascade    9. Screening for diabetes mellitus  - Comprehensive Metabolic  Panel    10. Screening for deficiency anemia  - HM2(CBC w/o Differential)    11. Encounter for vitamin deficiency screening  - Vitamin D, Total (25-Hydroxy)      Recommend repeat pap smear if normal every five years, Recommended adequate calcium intake/osteoporosis prevention, Discussed breast cancer screening guidelines, Discussed safe sex practices and Discussed diet, including moderation of portions sizes, avoiding eating out and fast food and increase in fruits and vegetables    Maya Ovalle, JAZMYN    Subjective:   Chief Complaint:  Establish Care and Annual Exam (fasting - pap)    HPI:  Ambar Rodriguez is a 39 y.o. female who presents for routine physical exam.  PMH notable for depression, anxiety, PCOS, asthma (childhood).  She works at a physical therapy office.  Engaged to be  in 2021    PCOS:  Irregular menstrual cycles. Birth control and metformin in the past though not for several years..  She typically has 2-3 periods a year.  These can be long and heavy.  She is interested in discussing management options though also wants to be aware that she will be trying to conceive shortly after her wedding in September 2021.  Would also like to work on dietary changes and exercise. FMH DM2 in both parents.     Depression/anxiety: History since adolescence.  Treated several times in the past.  [facility]. Started Lexapro 1/4.  Initially quite fatigued. Has improved.  Tried sertraline as an adolescent and again in 2013 (mildly effective).  Then fluoxetine (doesn't believe she took regularly).  Effexor (withdrawal symptoms).  She has been most bothered by anxiety symptoms in the past few months. Ruminating thoughts.  Difficulty relaxing.  Fixating on her family members safety after her brother suffered a traumatic fall last year.  Anhedonia as well.  No passive or active suicidal ideation.     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 Advised on colposcopy but did not schedule. No  "screening since that time.     OB/Gyn History:  Menstrual history: irregular  Date of previous pap: 2013  - NIL with +HRHPV  History of abnormal pap: 2013    Preventive Health:  Reviewed and recommended screening and treatment recommendations:  Mammography: no [facility]  Colonoscopy: no [facility]  Immunizations: up to date    Health Habits:    Exercise: not currently.  Calcium intake/Osteoporosis prevention: yes    PMH:   There is no problem list on file for this patient.      No past medical history on file.    Current Medications: Reviewed   Current Outpatient Medications on File Prior to Visit   Medication Sig Dispense Refill     escitalopram oxalate (LEXAPRO) 20 MG tablet Take 20 mg by mouth daily.       No current facility-administered medications on file prior to visit.        Allergies:  Reviewed  is allergic to alprazolam and clonazepam.    Social History:  Social History     Occupational History     Not on file   Tobacco Use     Smoking status: Never Smoker     Smokeless tobacco: Never Used   Substance and Sexual Activity     Alcohol use: Not on file     Drug use: Not on file     Sexual activity: Not on file       Family History:   No family history on file.      Review of Systems:  Complete head to toe review of systems is otherwise negative except as above.    Objective:    /84 (Patient Site: Left Arm, Patient Position: Sitting, Cuff Size: Adult Large)   Pulse 84   Ht 5' 8.75\" (1.746 m)   Wt (!) 321 lb (145.6 kg)   LMP 01/18/2020 (Exact Date)   Breastfeeding No   BMI 47.75 kg/m      GENERAL: Alert, well-appearing female .   PSYCH: Pleasant mood, affect appropriate.  Good judgment and insight.   SKIN: No atypical lesions  EYES: Conjunctiva pink, sclera white, no exudates. RHETT.  EOMs intact.   EARS: TMs pearly grey, no bulging, redness, retraction.   MOUTH: Pharynx moist, pink without exudate. No tonsillar enlargement  NECK: No lymphadenopathy. Thyroid borders smooth without enlargement, nodules.   CV: " Regular rate and rhythm without murmurs, rubs or gallops.  RESP: Lung sounds clear  ABDOMEN: BS+. Abdomen soft.  No organomegaly  BREASTS: Breasts symmetric, no dimpling, masses or skin discolorations seen. Areolas and nipples symmetric without discharge. On palpation, breast tissue supple and nontender. No masses or nodules. Axillary and epitrochlear lymph nodes nonpalpable.    FEMALE: External genitalia without lesions. Vaginal walls and cervix without lesions or masses. No abnormal discharge. Pap smear obtained. On bimanual palpation, uterus mobile, normal shape and contour. No adnexal masses or tenderness.   PV :  No edema

## 2021-06-05 NOTE — PROGRESS NOTES
Assessment & Plan   1. Type 2 diabetes mellitus without complication, without long-term current use of insulin (H)  New diagnosis of type 2 diabetes with A1c 12.8.  Today we reviewed overall care goals for diabetes, the etiology of diabetes, treatment options and specific plan for her.  We will start her on metformin 500 mg with slow increase every few days and dose to goal of 2000 mg daily.  She was advised on possible side effects with this and encouraged to slow down the titration if she develops diarrhea.  Will evaluate blood sugars at home and possibly consider add on a GLP-1 if readings still above goal after metformin.  She was introduced to Jesika today who saw her as well for visit.  We will plan to follow with her closely over the next month and see her back for visit in 1 month myself.  Given age and other risk factors no indication for statin or aspirin at this time.  Advised on yearly eye exam.  - metFORMIN (GLUCOPHAGE XR) 500 MG 24 hr tablet; One tablet daily at breakfast.  Increase by one tablet every three days until taking four tablets daily.  Dispense: 120 tablet; Refill: 1  - Microalbumin, Random Urine    2. Elevated LFTs  Likely fatty liver.  No significant alcohol intake or acetaminophen use.  Negative hepatitis panel.  We will plan to recheck in 1 month.  If persistently markedly elevated plan to order abdominal ultrasound to evaluate.    3. Elevated hemoglobin (H)  May be due to market elevations in plasma glucose.  Normal iron studies.  Will recheck at her next appointment in 1 month.    4. Screen for STD (sexually transmitted disease)  - Chlamydia trachomatis & Neisseria gonorrhoeae, Amplified Detection    Maya Ovalle CNP    Subjective   Chief Complaint:  Follow-up (lab results)    HPI:   Ambar Rodriguez is a 39 y.o. female who presents for follow-up    Diabetes: She was seen for physical 2 days ago and A1c returned at 12.8.  Fasting glucose 340.  She had been experiencing some mild  increased urination and thirst.  No recent weight loss.  Family history type 2 diabetes in both parents.  She is here today to discuss diagnosis and treatment.  She has made some recent dietary changes over the past month and she and her fiancé are trying to focus more on lean proteins and vegetables.  Lipid panel relatively normal with LDL of 108, total 187.  No prior history of hypertension.  Other than PCOS no comorbidities.    LFTs: AST 83, , alkaline phosphatase 180.  No prior elevations at her previous clinic noted, however last labs over 3 years ago.  She states she consumes alcohol approximately once per month, 1-2 drinks at a time.  No consistent acetaminophen use.  Negative hepatitis panel.    Mild polycythemia with hemoglobin 16.1 red blood cell count 5.61.  Ferritin and iron studies were normal.    Allergies:  is allergic to alprazolam and clonazepam.    SH/FH:  Social History and Family History reviewed and updated.   Tobacco Status:  She  reports that she has never smoked. She has never used smokeless tobacco.    Review of Systems:  A complete head to toe ROS is negative unless otherwise noted in HPI    Objective     Vitals:    01/22/20 0936   BP: 134/82   Patient Site: Right Arm   Patient Position: Sitting   Cuff Size: Adult Large   Pulse: 80   Weight: (!) 321 lb (145.6 kg)       Physical Exam:  GENERAL: Alert, well-appearing female .   PSYCH: Pleasant mood, affect appropriate.  Good judgment and insight.  Intact recent and remote memory.  Good eye contact.  CV: Regular rate and rhythm without murmurs, rubs or gallops.  RESP: Lung sounds clear

## 2021-06-05 NOTE — TELEPHONE ENCOUNTER
"RN Triage:    History of polycystic ovarian syndrome.    Has been having this period now since 1/18/20.  \"Three week long periods are not unusual for me\".  Periods are usually heavy, but this one is worse than usual.  Heavier than usual bleeding began 2/3/20.  Going through about 1 1/2 pads per hour since then.  Getting up multiple times at night.  Began feeling slightly dizzy today, but is able to drive and do ADL's.  Appointment made in clinic for this afternoon.    Yi Ken, RN  [facility]    Reason for Disposition    MODERATE vaginal bleeding (i.e., soaking 1 pad or tampon per hour and present > 6 hours; 1 menstrual cup every 6 hours)    Protocols used: VAGINAL BLEEDING - [id number]      "

## 2021-06-06 NOTE — PROGRESS NOTES
Assessment & Plan   1. Type 2 diabetes mellitus without complication, without long-term current use of insulin (H)  She has responded well to the Metformin and is tolerating well.  She forgot her meter today, however based on her recall post dinner numbers well within normal range.  AM readings slightly high but does have some within goal range.  Ultimately we decided to continue on Metformin solo therapy for now and recheck her A1C in one month along with her meter readings. Advised occasionally checking post lunch as well as that seems to be her biggest meal of the day.  Would consider GLP1 or SGLT2 versus background insulin.  We discussed the possibility of injections today and she states she actually feels fine with this.  Has injected Lovenox previously and had no difficulty.    - metFORMIN (GLUCOPHAGE XR) 500 MG 24 hr tablet; One tablet daily at breakfast.  Increase by one tablet every three days until taking four tablets daily.  Dispense: 360 tablet; Refill: 1  - Pneumococcal polysaccharide vaccine 23-valent 3 yo or older, subq/IM    2. PCOS (polycystic ovarian syndrome)  Interested in fertility testing. She would like to wait on this until her diabetes is completely controlled.  We discussed testing for ovarian reserve and that this can give us an idea of her fertility though is independent of PCOS.  Consider referral to reproductive endocrinology if cycles continue to be quite irregular on Metformin.     3. AMANDA (generalized anxiety disorder)  Well controlled on Lexapro.     4. Immunization counseling  - Influenza, Seasonal Quad, PF =/> 6months  - Tdap vaccine,  6yo or older,  IM    Maya Ovalle CNP    Subjective   Chief Complaint:  Follow-up (change in insurance /pharmacy )    HPI:   Ambar SOSA Jennifer is a 39 y.o. female who presents for follow up for diabetes.     DM:  Diagnosis in January with A1C 12.8.  Started on Metformin. Now at 2000mg.  Met with Jesika and diabetes education.  She states she has been  tolerating the Metformin well.  Takes it at night and no difficulties with GI upset.  She is no longer experiencing polydipsia or polyuria.  She overall feels better. Still a little fatigued.  She and her fiancee have been following a low carb, high protein diet.  Typically a protein, vegetable and grain at every meal.  She states carbs vary between meals.  Breakfast usually 1-2 carb choices, lunch 5-6 carb choices, dinner 1-2 carb choices. Forgot her meter today.  Has been testing in the morning and states on average AM is in the 150s though has been as low as 116.  Also testing two hours post dinner and typically in the 130s. She has not been testing post breakfast or post lunch.      Fertility:  She and her fiance will be  next summer and hope to try to conceive immediately.  Interested in fertility testing once her diabetes is controlled.   Periods are irregular with PCOS.    Elevated ALT/AST:  She states that she spoke with her sister and she informed her that she has been diagnosed with primary biliary cholangitis.  Wondering if she should be concerned about this.     Anxiety:  Restarted Lexapro in early January.  Has noted improvement with this.       Allergies:  is allergic to alprazolam and clonazepam.    SH/FH:  Social History and Family History reviewed and updated.   Tobacco Status:  She  reports that she has never smoked. She has never used smokeless tobacco.    Review of Systems:  A complete head to toe ROS is negative unless otherwise noted in HPI    Objective     Vitals:    03/11/20 0854   BP: 130/80   Pulse: 82   SpO2: 98%   Weight: (!) 314 lb (142.4 kg)       Physical Exam:  GENERAL: Alert, well-appearing female .   PSYCH: Pleasant mood, affect appropriate.  Good judgment and insight.   CV: Regular rate and rhythm without murmurs, rubs or gallops.  RESP: Lung sounds clear

## 2021-06-06 NOTE — TELEPHONE ENCOUNTER
RN cannot approve Refill Request    RN can NOT refill this medication PCP to review. Last office visit: Visit date not found Last Physical: Visit date not found Last MTM visit: Visit date not found Last visit same specialty: 2/6/2020 Adelina Skinner MD.  Next visit within 3 mo: Visit date not found  Next physical within 3 mo: Visit date not found      Audelia Bro, [facility] Connection Triage/Med Refill 2/29/2020    Requested Prescriptions   Pending Prescriptions Disp Refills     escitalopram oxalate (LEXAPRO) 20 MG tablet [Pharmacy Med Name: ESCITALOPRAM 20MG TABLETS] 30 tablet 0     Sig: TAKE 1 TABLET(20 MG) BY MOUTH DAILY       SSRI Refill Protocol  Passed - 2/28/2020  4:46 PM        Passed - PCP or prescribing provider visit in last year     Last office visit with prescriber/PCP: Visit date not found OR same dept: 1/22/2020 Maya Ovalle CNP OR same specialty: 2/6/2020 Adelina Skinner MD  Last physical: Visit date not found Last MTM visit: Visit date not found   Next visit within 3 mo: Visit date not found  Next physical within 3 mo: Visit date not found  Prescriber OR PCP: Celestina Sevilla MD  Last diagnosis associated with med order: 1. AMANDA (generalized anxiety disorder)  - escitalopram oxalate (LEXAPRO) 20 MG tablet [Pharmacy Med Name: ESCITALOPRAM 20MG TABLETS]; TAKE 1 TABLET(20 MG) BY MOUTH DAILY  Dispense: 30 tablet; Refill: 0    If protocol passes may refill for 12 months if within 3 months of last provider visit (or a total of 15 months).

## 2021-06-06 NOTE — PATIENT INSTRUCTIONS - HE
1. Test blood sugar 1 time(s) per day (either before breakfast or 2 hours after dinner).  2. Eat smaller amounts more often. Avoid skipping meals.  3. Avoid sugary drinks (regular soda, lemonade, sweetened tea and Gatorade). Drink more water.  4. Eat fresh fruit instead of juice.  5. Include high fiber, whole grain foods instead of processed white foods. Healthier choices are whole grain cereals, whole wheat pasta, brown or wild rice and whole wheat bread.   6. Aim for foods with 3 grams or more fiber per serving. Limit added sugars to 20 grams or less per day.  7. Stay active every day; try not to sit for more than 30 minutes and walk 20-30 minutes most days of the week.   8. Bring meter and blood sugar log to next visit in 6 weeks.  9. Make a follow-up appointment with Maya Zheng around 2/22/20.    Blood sugar targets:   Before meals:   1-2 hours after meals: less 180  Bedtime:     A1c target of less than 7.0% (estimated average blood sugar of 154 on your meter)

## 2021-06-06 NOTE — PROGRESS NOTES
DIABETES CARE PLAN    Assessment/Plan:     Initial visit for newly diagnosed Type 2 diabetes. Instructed on what is diabetes and how to control it. Reviewed symptoms and treatment of high and low blood sugar. Instructed on general diet guidelines, carbohydrate counting and the importance of weight control and daily activity. Ambar has been limiting the carbohydrate in her diet. Her fiance is following the Keto diet. Patient has not started exercising yet. The plan is to buy a treadmill.     Ambar is checking her blood sugar once per day. The majority of blood sugars are above target. Discussed that she may need additional medication. She would like to try lifestyle changes first.    FB, 169, 190, 177, 216, 202, 267, 190, 231  2 hours after dinner: 191, 185, 191, 148, 167, 241       Diabetes Medications:  Metformin Extended Release 2000 mg once per day at bedtime. Tolerating welll.    PLAN  1. Test blood sugar 1 time(s) per day (either before breakfast or 2 hours after dinner).  2. Eat smaller amounts more often. Avoid skipping meals.  3. Avoid sugary drinks (regular soda, lemonade, sweetened tea and Gatorade). Drink more water.  4. Eat fresh fruit instead of juice.  5. Include high fiber, whole grain foods instead of processed white foods. Healthier choices are whole grain cereals, whole wheat pasta, brown or wild rice and whole wheat bread.   6. Aim for foods with 3 grams or more fiber per serving. Limit added sugars to 20 grams or less per day.  7. Stay active every day; try not to sit for more than 30 minutes and walk 20-30 minutes most days of the week.   8. Bring meter and blood sugar log to next visit in 6 weeks.  9. Make a follow-up appointment with Maya Zheng around 20.    Subjective/Objective:   Ambar Rodriguez is a 39 y.o. 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 One Touch Verio Flex. Jesika taught the meter.  Monitoring: Discussed and Competent  BG goals: Discussed and Literature provided    Nutrition Management  Nutrition Management: Discussed and Literature provided  Carbohydrate counting; Discussed and Literature provided  Healthy Snack Ideas: Discussed and Literature provided  Weight: Discussed and Literature provided  Portions/Balance: Discussed     Physical Activity: Discussed and Literature provided  Medications: Discussed and Literature provided    Diabetes Disease Process: Discussed and Literature provided  ABC: Discussed and Literature provided  A1c test and how it relates to meter numbers: Discussed and Literature provided    Acute Complications: Prevent, Detect, Treat:  Hypoglycemia: Discussed and Literature provided  Hyperglycemia: Discussed and Literature provided    Goal Setting and Problem Solving: Discussed and Literature provided  Barriers: Assessed and generalized anxiety disorder  Psychosocial Adjustments: Assessed    Time spent with the patient: 60 minutes   Visit Type: Diabetes Self-Management Training ()  Diagnosis per referral:Type 2 diabetes without complications, without long-term use of insulin (E11.9)    Divya Lanza, RD, LD, CDE  Diabetes Educator  2/18/2020

## 2021-06-07 ENCOUNTER — COMMUNICATION - HEALTHEAST (OUTPATIENT)
Dept: FAMILY MEDICINE | Facility: CLINIC | Age: 41
End: 2021-06-07

## 2021-06-07 DIAGNOSIS — F41.1 GAD (GENERALIZED ANXIETY DISORDER): ICD-10-CM

## 2021-06-07 NOTE — PROGRESS NOTES
"Ambar Rodriguez is a 39 y.o. female who is being evaluated via a billable telephone visit.      The patient has been notified of following:     \"This telephone visit will be conducted via a call between you and your physician/provider. We have found that certain health care needs can be provided without the need for a physical exam.  This service lets us provide the care you need with a short phone conversation.  If a prescription is necessary we can send it directly to your pharmacy.  If lab work is needed we can place an order for that and you can then stop by our lab to have the test done at a later time.    If during the course of the call the physician/provider feels a telephone visit is not appropriate, you will not be charged for this service.\"     Patient has given verbal consent to a Telephone visit? Yes    Ambar Rodriguez complains of    Chief Complaint   Patient presents with     Anxiety       I have reviewed and updated the patient's Past Medical History, Social History, Family History and Medication List.    ALLERGIES  Alprazolam and Clonazepam    Additional provider notes:         Subjective   Chief Complaint:  Anxiety    HPI:   Ambar Rodriguez is a 39 y.o. female who presents for anxiety    H/o AMANDA and depression.  Has been treated several times in the past. Started on Lexapro in December. She feels she was doing well for awhile.  Felt very even. Mom noted she wasn't as anxious.  This continued until about a month ago, but anxiety has now ramped up quite a bit with COVID.  Back to frequent ruminating thoughts about the health and safety of her family as well as herself.  Difficulty sleeping at night.  Acute anxiety episodes a few days a week.  Feels chest tightening, difficulty breathing.  Has been trying to exercise, do deep breathing but sometimes this isn't helpful. Has been on a two week leave from work.  With her underlying diabetes she has been concerned about roly COVID.  She does NOT have " the ability to work from home but her job has been flexible with leave and she has decided to take it.  Will need a letter specifying one more month of leave.      Asthma:  Allergies ramping up.  Zyrtec is helpful for her. Only has trouble with breathing with seasonal allergies or pet allergies.  Has not had difficulties for the past two years though the past couple of weeks she has felt a bit short of breath.  Wondering about albuterol refill.       Allergies:  is allergic to alprazolam and clonazepam.    SH/FH:  Social History and Family History reviewed and updated.   Tobacco Status:  She  reports that she has never smoked. She has never used smokeless tobacco.    Review of Systems:  A complete head to toe ROS is negative unless otherwise noted in HPI      Assessment/Plan:  1. AMANDA (generalized anxiety disorder)  Previously well controlled with Lexapro 20mg.  Now increase in symptoms r/t COVID and ruminating thoughts on her family's health.  We discussed options for treatment . As the Lexapro has been effective for her, hesitant to switch this up.  Discussed add on options of buspirone, gabapentin, benzos. She has reacted very strongly to benzodiazepines in the past.  Will try gabapentin.  Start at 100mg and self titrate up to 300mg per dose.  Follow up in three weeks for recheck.  Phone visit scheduled.    - gabapentin (NEURONTIN) 100 MG capsule; Take 1-3 capsules (100-300mg) three times a day as needed for anxiety  Dispense: 90 capsule; Refill: 1    2. Mild recurrent major depression (H)  Depression symptoms fairly well controlled. Mild increase with increase in anxiety.     3. Mild intermittent asthma without complication  Previously well controlled.  With allergy season has noted some very intermittent shortness of breath.  Refill of albuterol.   - albuterol (PROAIR HFA;PROVENTIL HFA;VENTOLIN HFA) 90 mcg/actuation inhaler; Inhale 2 puffs every 6 (six) hours as needed for wheezing.  Dispense: 18 g; Refill:  1    4. Type 2 diabetes mellitus without complication, without long-term current use of insulin (H)  Continues on Metformin.  Will review her blood sugars at next visit and decide if we should consider GLP1/SGLT2 as add on.     Phone call duration:  18 minutes    Maya Ovalle, DNP

## 2021-06-07 NOTE — PROGRESS NOTES
"Ambar Rodriguez is a 39 y.o. female who is being evaluated via a billable telephone visit.      The patient has been notified of following:     \"This telephone visit will be conducted via a call between you and your physician/provider. We have found that certain health care needs can be provided without the need for a physical exam.  This service lets us provide the care you need with a short phone conversation.  If a prescription is necessary we can send it directly to your pharmacy.  If lab work is needed we can place an order for that and you can then stop by our lab to have the test done at a later time.    Telephone visits are billed at different rates depending on your insurance coverage. During this emergency period, for some insurers they may be billed the same as an in-person visit.  Please reach out to your insurance provider with any questions.    If during the course of the call the physician/provider feels a telephone visit is not appropriate, you will not be charged for this service.\"    Patient has given verbal consent to a Telephone visit? Yes    Patient would like to receive their AVS by AVS Preference: Maria Elena.    Additional provider notes:      Subjective   Chief Complaint:  Anxiety and Diabetes    HPI:   Ambar Rodriguez is a 39 y.o. female who presents for anxiety, diabetes follow up.     Anxiety: H/o recurrent depression and anxiety.  Started on Lexapro in December with initial good improvement. With COVID had experienced flare in anxiety. Acute anxiety episodes.  On a voluntary leave from work d/t concerns about roly COVID. Last visit one month ago prescribed gabapentin as add on to Lexapro. She states she does feel anxiety has improved significantly.  She has been taking gabapentin as she needs it and feels like it is effective when she takes it.  However, her sister most likely has COVID so that has been very stressful for her. Just found out yesterday.  Sister has an autoimmune " condition and heart condition.     DM:  New diagnosis earlier this year.  On metformin max dose.  Tolerating this well.  She does check her blood sugar regularly    AM:  183, 147, 180, 137, 190, 177, 143, 177, 150   Post meal: 115, 145, 115, 147, 118, 118, 124, 120    She has been snacking more but snacking on veggies.  Low carb, low sugar.  She and her fiancee have been adopting this together.  No carbs other than veggies in the house.  This has been much easier to adopt as a household than on her own.       Allergies:  is allergic to alprazolam and clonazepam.    SH/FH:  Social History and Family History reviewed and updated.   Tobacco Status:  She  reports that she has never smoked. She has never used smokeless tobacco.    Review of Systems:  A complete head to toe ROS is negative unless otherwise noted in HPI    Assessment & Plan   1. AMANDA (generalized anxiety disorder)  Good improvement in anxiety with gabapentin prn for acute anxiety.  She feels this is now much more manageable and is more at ease just knowing she has an option to treat acute anxiety as it comes up.  Continues on Lexapro 20mg as well.  Recheck six months or sooner if anxiety worsens.     2. Type 2 diabetes mellitus without complication, without long-term current use of insulin (H)  New diagnosis earlier this year.  On max dose metformin.  Have not repeated A1C since diagnosis but checking home blood sugars regularly.  Today we reviewed her last two weeks of readings and post meal sugars are actually very well controlled.  Congratulated on the significant dietary changes she has made.  Fasting blood sugars continue to be fairly significantly above goal.  Discussed options of SGLT2 versus basal insulin.  GLP1 not a great option for her as post meal numbers are already low. She was counseled on risks, benefits, side effects and would like to try an SGLT2.  Prescription for Farxiga.  Recheck one month with home readings and likely in person labs to  recheck renal function.   - dapagliflozin 5 mg Tab; Take 5 mg by mouth daily.  Dispense: 30 tablet; Refill: 1    Maya Ovalle CNP    Phone call duration:  26 minutes

## 2021-06-08 RX ORDER — GABAPENTIN 100 MG/1
CAPSULE ORAL
Qty: 270 CAPSULE | Refills: 3 | Status: SHIPPED | OUTPATIENT
Start: 2021-06-08 | End: 2022-01-10

## 2021-06-08 NOTE — TELEPHONE ENCOUNTER
Refill Approved    Rx renewed per Medication Renewal Policy. Medication was last renewed on 4/1/20.    Ellen Kaur, [facility] Triage/Med Refill 5/26/2020     Requested Prescriptions   Pending Prescriptions Disp Refills     gabapentin (NEURONTIN) 100 MG capsule [Pharmacy Med Name: GABAPENTIN 100MG CAPSULES] 90 capsule 1     Sig: TAKE 1 TO 3 CAPSULES(100  MG) BY MOUTH THREE TIMES DAILY AS NEEDED FOR ANXIETY       Gabapentin/Levetiracetam/Tiagabine Refill Protocol  Passed - 5/21/2020  1:45 PM        Passed - PCP or prescribing provider visit in past 12 months or next 3 months     Last office visit with prescriber/PCP: 3/11/2020 Maya Ovalle CNP OR same dept: 3/11/2020 Maya Ovalle CNP OR same specialty: 3/11/2020 Maya Ovalle CNP  Last physical: 1/20/2020 Last MTM visit: Visit date not found   Next visit within 3 mo: Visit date not found  Next physical within 3 mo: Visit date not found  Prescriber OR PCP: Maya Ovalle CNP  Last diagnosis associated with med order: 1. AMANDA (generalized anxiety disorder)  - gabapentin (NEURONTIN) 100 MG capsule [Pharmacy Med Name: GABAPENTIN 100MG CAPSULES]; TAKE 1 TO 3 CAPSULES(100  MG) BY MOUTH THREE TIMES DAILY AS NEEDED FOR ANXIETY  Dispense: 90 capsule; Refill: 1    If protocol passes may refill for 12 months if within 3 months of last provider visit (or a total of 15 months).

## 2021-06-09 ENCOUNTER — COMMUNICATION - HEALTHEAST (OUTPATIENT)
Dept: FAMILY MEDICINE | Facility: CLINIC | Age: 41
End: 2021-06-09

## 2021-06-09 DIAGNOSIS — F41.1 GAD (GENERALIZED ANXIETY DISORDER): ICD-10-CM

## 2021-06-09 NOTE — TELEPHONE ENCOUNTER
RN cannot approve Refill Request    RN can NOT refill this medication Protocol failed and NO refill given. Last office visit: 3/11/2020 Maya Ovalle CNP Last Physical: 1/20/2020 Last MTM visit: Visit date not found Last visit same specialty: 3/11/2020 Maya Ovalle CNP.  Next visit within 3 mo: Visit date not found  Next physical within 3 mo: Visit date not found      Victoria Ivan, [facility] Triage/Med Refill 6/20/2020    Requested Prescriptions   Pending Prescriptions Disp Refills     FARXIGA 5 mg Tab [Pharmacy Med Name: FARXIGA 5MG TABLETS] 30 tablet 1     Sig: TAKE ONE TABLET BY MOUTH EVERY DAY       There is no refill protocol information for this order

## 2021-06-09 NOTE — TELEPHONE ENCOUNTER
RN cannot approve Refill Request    RN can NOT refill this medication med is not covered by policy/route to provider. Last office visit: 3/11/2020 Maya Ovalle CNP Last Physical: 1/20/2020 Last MTM visit: Visit date not found Last visit same specialty: 3/11/2020 Maya Ovalle CNP.  Next visit within 3 mo: Visit date not found  Next physical within 3 mo: Visit date not found      [employer], [facility] Triage/Med Refill 7/22/2020    Requested Prescriptions   Pending Prescriptions Disp Refills     dapagliflozin (FARXIGA) 5 mg Tab 30 tablet 1     Sig: Take 5 mg by mouth daily.       There is no refill protocol information for this order

## 2021-06-09 NOTE — PROGRESS NOTES
"Ambar Rodriguez is a 39 y.o. female who is being evaluated via a billable telephone visit.      The patient has been notified of following:     \"This telephone visit will be conducted via a call between you and your physician/provider. We have found that certain health care needs can be provided without the need for a physical exam.  This service lets us provide the care you need with a short phone conversation.  If a prescription is necessary we can send it directly to your pharmacy.  If lab work is needed we can place an order for that and you can then stop by our lab to have the test done at a later time.    Telephone visits are billed at different rates depending on your insurance coverage. During this emergency period, for some insurers they may be billed the same as an in-person visit.  Please reach out to your insurance provider with any questions.    If during the course of the call the physician/provider feels a telephone visit is not appropriate, you will not be charged for this service.\"    Patient has given verbal consent to a Telephone visit? Yes    What phone number would you like to be contacted at? [phone]    Patient would like to receive their AVS by AVS Preference: Maria Elena.     Chief Complaint   Patient presents with     Follow-up     mental health     Letter for School/Work     If cleared to go back to work,      Depression and anxiety  Geni is a patient of  Maya Ovalle CNP , currently on leave.  Geni says her anxiety has been really bad.  For, for a while it was better, now gone back to the way it was before she started taking escitalopram  20 mg daily. She has  gabapentin for anxiety too - that doesn't make a difference.    She is concerned about her risk of Covid19 with her ongoing issues of diabetes and intermittent asthma - this adds to her anxiety and she requests a letter to work. 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 JAZMYN Maya Ovalle had written her a previous excuse, but that only lasted for three months, and she will need a new one to cover the rest of summer.    She lives with her fiances - he is a  - everyone has to wear masks.    We discuss ho to address her increased anxiety.  She says benzodiazepines made her feel loopy and weird.  She has not  taken Paxil or sertraline, but we agree changing SSRIs would not be a good idea.  RE SNRIs - she has been on Effexor - didn't have a good reaction. She has not tried Buspar    She had an appointment for counseling - too expensive. Recommended calling insurance to find out how much is covered and who is covered for mental health  She will call insurance    She worries a lot about her family and her parents during  CoVid19 pandemic     Little interest or pleasure in doing things: Several days  Feeling down, depressed, or hopeless: Several days  Trouble falling or staying asleep, or sleeping too much: Nearly every day  Feeling tired or having little energy: Nearly every day  Poor appetite or overeating: More than half the days  Feeling bad about yourself - or that you are a failure or have let yourself or your family down: Several days  Trouble concentrating on things, such as reading the newspaper or watching television: More than half the days  Moving or speaking so slowly that other people could have noticed. Or the opposite - being so fidgety or restless that you have been moving around a lot more than usual: Not at all  Thoughts that you would be better off dead, or of hurting yourself in some way: Not at all  PHQ-9 Total Score: 13  If you checked off any problems, how difficult have these problems made it for you to do your work, take care of things at home, or get along with other people?: Somewhat difficult    AMANDA-7 Total: 0 (2/6/2020  3:00 PM)  AMANDA 7 Total Score: 11 (7/2/2020  3:00 PM)      Diabetes: She is  diagnosed with diabetes back in January    Lab Results   Component Value Date    HGBA1C 12.6 (H) 01/20/2020     Morning blood sugars - they are still in 160s - 170s.  She just started Farxiga at the end of June    Asthma  Uses inhalers a couple of times a week - unknown triggers    ACT Total Score: 22 (4/22/2020  8:15 AM)            Assessment/Plan:    1. AMANDA (generalized anxiety disorder)  Worse recently and not adequately controlled on her current medications of citalopram and gabapentin.  Trial of adding on  - busPIRone (BUSPAR) 30 MG tablet; Take 1 tablet (30 mg total) by mouth 2 (two) times a day.  Dispense: 30 tablet; Refill: 1    2. Type 2 diabetes mellitus without complication, without long-term current use of insulin (H)  Had been taking metformin but her recent blood sugars, this alone is not enough.  Dr. 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  Suggested A1C in another 1-2 months  - Glycosylated Hemoglobin A1c; Future    3. Mild intermittent asthma without complication  Controlled with PRN metformin     Phone call duration:  24 minutes  3:14 - 3:38  Jinny Garzon MD

## 2021-06-09 NOTE — TELEPHONE ENCOUNTER
FYI - Status Update  Who is Calling: [facility]  Update: Please send 90 day supply  Okay to leave a detailed message?:  No return call needed

## 2021-06-14 NOTE — TELEPHONE ENCOUNTER
Will provide one month refill. Patient needs to be seen prior to any additional refills.     iY Vasques

## 2021-06-14 NOTE — PATIENT INSTRUCTIONS - HE
Blood sugar targets     - Before meal capillary blood glucose concentration: 80 to 110 mg/dL (4.4 to 6.1 mmol/L)   - Two-hour postprandial glucose concentration: <155 mg/dL (8.6 mmol/L)      Glucose testing:  I would like to see a fasting number, a pre-meal number and a post meal (two hours) every day as we adjust your insulin.      Insulin Levemir:  Start with 15 units at bedtime.  Every three days, look at morning insulin levels.  If > 110, increase the dose by two units until the majority are < 110.  If you have any readings < 70, decrease your dose by two units and call/mychart

## 2021-06-14 NOTE — TELEPHONE ENCOUNTER
RN cannot approve Refill Request    RN can NOT refill this medication Protocol failed and NO refill given. Last office visit: Visit date not found Last Physical: Visit date not found Last MTM visit: Visit date not found Last visit same specialty: 3/11/2020 Maya Ovalle, JAZMYN.  Next visit within 3 mo: Visit date not found  Next physical within 3 mo: Visit date not found      Victoria Ivan, [facility] Triage/Med Refill 1/2/2021    Requested Prescriptions   Pending Prescriptions Disp Refills     FARXIGA 5 mg Tab [Pharmacy Med Name: FARXIGA TABS 5MG] 30 tablet 11     Sig: TAKE 1 TABLET DAILY       There is no refill protocol information for this order

## 2021-06-14 NOTE — TELEPHONE ENCOUNTER
Maya please review message below, I have pended Rx with correction with requested brand of One Touch Verio test strips. Thank you.

## 2021-06-14 NOTE — PROGRESS NOTES
"Assessment & Plan   1. Type 2 diabetes mellitus without complication, without long-term current use of insulin (H)  Reviewed pre-conception glucose goals for type 2 diabetes. A1C elevated at 8.6 today so we have some work to do.  Ultimately decided to stop Farxiga and switch to long acting insulin as this will be the safest course in pregnancy in the future.  Start detemir at 15 units and titrate up to goal.  She feels comfortable with this plan.  Recheck one month.    - Glycosylated Hemoglobin A1c  - Comprehensive Metabolic Panel  - Microalbumin, Random Urine  - insulin detemir U-100 (LEVEMIR FLEXTOUCH U-100 INSULN) 100 unit/mL (3 mL) pen; Inject 15 Units under the skin at bedtime. 11.9 Type 2 without complications  Dispense: 3 mL; Refill: 3  - pen needle, diabetic (BD ULTRA-FINE DONNELL PEN NEEDLE) 32 gauge x 5/32\" Ndle; Use as directed for insulin pen  Dispense: 90 each; Refill: 1  - blood glucose test strips; Use to check blood sugars three times daily.  Dispense brand per patient's insurance at pharmacy discretion.  Dispense: 100 strip; Refill: 11  - lancets (ONETOUCH DELICA LANCETS) 33 gauge Misc; Use to check blood sugars three times daily. Jasbir  Dispense: 100 each; Refill: 11    2. Pre-conception counseling  Again reviewed preconception goals, focus on weight loss and diabetes management  - Thyroid Cascade    3. Morbid obesity (H)  Difficulty with impulse control and controlling appetite. Phentermine has previously been effective for her. She understands this will not be safe in pregnancy but reasonable to use short-term prior to trying to conceive.  Counseled on risks, side effects.  Recheck one month.    - phentermine 30 MG capsule; Take 1 capsule (30 mg total) by mouth every morning.  Dispense: 30 capsule; Refill: 1    4. AMANDA (generalized anxiety disorder)  Well controlled with Lexapro.     5. Mild recurrent major depression (H)  Well controlled with Lexapro.     Maya Ovalle, CNP    Subjective   Chief " Complaint:  No chief complaint on file.    HPI:   Ambar Rodriguez is a 40 y.o. female who presents for follow up    Diabetes:  A1C today 8.6. Diagnosis one year ago.  Metformin 2000mg and Farxiga 5mg.  Has not had any follow up since April of last year due to COVID. Did take a break in Farxiga d/t lack of insurance coverage. Now back on one week.  Blood sugars 150s morning.  Back near the holidays when diet was much poorer would occasionally get low 200s.  States she feels back on track with diet and feeling motivated.  Partner supportive, doing low carb together at home.      Anxiety:  Lexapro 20mg.  Tried buspirone last summer though felt this increased food cravings and weight so stopped this. Feels like anxiety is much better controlled now.  Is working in person and feels the routine is good for her.      Weight:  Eating when bored. Snacking throughout the day.  Difficulty controlling cravings.  On phentermine ten years ago and this was effective at the time.  Wondering if she can restart.     Pre-conception: Will be getting  in September. Plan to try to conceive immediately.  Would like to discuss optimal management of diabetes and health for this.        Allergies:  is allergic to alprazolam and clonazepam.    SH/FH:  Social History and Family History reviewed and updated.   Tobacco Status:  She  reports that she has never smoked. She has never used smokeless tobacco.    Review of Systems:  A complete head to toe ROS is negative unless otherwise noted in HPI    Objective   There were no vitals filed for this visit.    Physical Exam:  GENERAL: Alert, well-appearing   PSYCH: Pleasant mood, affect appropriate.  Good judgment and insight.   CV: Regular rate and rhythm without murmurs, rubs or gallops.  RESP: Lung sounds clear

## 2021-06-14 NOTE — TELEPHONE ENCOUNTER
Refill Approved    Rx renewed per Medication Renewal Policy. Medication was last renewed on 3/26/20.    Ellen Kaur, [facility] Triage/Med Refill 12/29/2020     Requested Prescriptions   Pending Prescriptions Disp Refills     escitalopram oxalate (LEXAPRO) 20 MG tablet [Pharmacy Med Name: ESCITALOPRAM 20 MG TABLET] 60 tablet 0     Sig: TAKE 1 TABLET BY MOUTH DAILY       [facility] Refill Protocol  Passed - 12/28/2020  8:37 PM        Passed - PCP or prescribing provider visit in last year     Last office visit with prescriber/PCP: 3/11/2020 Maya Ovalle CNP OR same dept: Visit date not found OR same specialty: 3/11/2020 Maya Ovalle CNP  Last physical: 1/20/2020 Last MTM visit: Visit date not found   Next visit within 3 mo: Visit date not found  Next physical within 3 mo: Visit date not found  Prescriber OR PCP: Maya Ovalle CNP  Last diagnosis associated with med order: 1. AMANDA (generalized anxiety disorder)  - escitalopram oxalate (LEXAPRO) 20 MG tablet [Pharmacy Med Name: ESCITALOPRAM 20 MG TABLET]; TAKE 1 TABLET BY MOUTH DAILY  Dispense: 60 tablet; Refill: 0    If protocol passes may refill for 12 months if within 3 months of last provider visit (or a total of 15 months).

## 2021-06-14 NOTE — TELEPHONE ENCOUNTER
RN cannot approve Refill Request    RN can NOT refill this medication Protocol failed and NO refill given. Last office visit: 3/11/2020 Maya Ovalle CNP Last Physical: 1/20/2020 Last MTM visit: Visit date not found Last visit same specialty: 3/11/2020 Maya Ovalle CNP.  Next visit within 3 mo: Visit date not found  Next physical within 3 mo: Visit date not found      Ellen Kaur, [facility] Triage/Med Refill 1/5/2021    Requested Prescriptions   Pending Prescriptions Disp Refills     metFORMIN (GLUCOPHAGE-XR) 500 MG 24 hr tablet [Pharmacy Med Name: METFORMIN ER 500MG 24HR TABS] 120 tablet 0     Sig: TAKE ONE TABLET BY MOUTH AT BREAKFAST. INCREASE BY ONE TABLET BY MOUTH EVERY 3 DAYS UNTIL TAKING 4 TABLETS DAILY.       Metformin Refill Protocol Failed - 1/4/2021  3:07 PM        Failed - Visit with PCP or prescribing provider visit in last 6 months or next 3 months     Last office visit with prescriber/PCP: Visit date not found OR same dept: 3/11/2020 Maya Ovalle CNP OR same specialty: 3/11/2020 Maya Ovalle CNP Last physical: Visit date not found Last MTM visit: Visit date not found         Next appt within 3 mo: Visit date not found  Next physical within 3 mo: Visit date not found  Prescriber OR PCP: Maya Ovalle CNP  Last diagnosis associated with med order: 1. Type 2 diabetes mellitus without complication, without long-term current use of insulin (H)  - metFORMIN (GLUCOPHAGE-XR) 500 MG 24 hr tablet [Pharmacy Med Name: METFORMIN ER 500MG 24HR TABS]; TAKE ONE TABLET BY MOUTH AT BREAKFAST. INCREASE BY ONE TABLET BY MOUTH EVERY 3 DAYS UNTIL TAKING 4 TABLETS DAILY.  Dispense: 120 tablet; Refill: 0     If protocol passes may refill for 12 months if within 3 months of last provider visit (or a total of 15 months).           Failed - A1C in last 6 months     Hemoglobin A1c   Date Value Ref Range Status   01/20/2020 12.6 (H) 3.5 - 6.0 % Final               Passed - Blood pressure in last 12 months     BP  Readings from Last 1 Encounters:   03/11/20 130/80             Passed - LFT or AST or ALT in last 12 months     Albumin   Date Value Ref Range Status   02/06/2020 3.7 3.5 - 5.0 g/dL Final     Bilirubin, Total   Date Value Ref Range Status   02/06/2020 0.3 0.0 - 1.0 mg/dL Final     Bilirubin, Direct   Date Value Ref Range Status   10/18/2012 <0.1 <0.6 mg/dL Final     Alkaline Phosphatase   Date Value Ref Range Status   02/06/2020 106 45 - 120 U/L Final     AST   Date Value Ref Range Status   02/06/2020 57 (H) 0 - 40 U/L Final     ALT   Date Value Ref Range Status   02/06/2020 105 (H) 0 - 45 U/L Final     Protein, Total   Date Value Ref Range Status   02/06/2020 7.0 6.0 - 8.0 g/dL Final                Passed - GFR or Serum Creatinine in last 6 months     GFR MDRD Non Af Amer   Date Value Ref Range Status   02/06/2020 >60 >60 mL/min/1.73m2 Final     GFR MDRD Af Amer   Date Value Ref Range Status   02/06/2020 >60 >60 mL/min/1.73m2 Final             Passed - Microalbumin in last year      Microalbumin, Random Urine   Date Value Ref Range Status   01/22/2020 0.98 0.00 - 1.99 mg/dL Final

## 2021-06-14 NOTE — PATIENT INSTRUCTIONS - HE
1. Exam reveals pharyngitis, an inflamed back of the throat. Small but real possibility of strep.     2. Treatment with azithromycin, Zpak, take two pills today then one pill daily for 4 days.     3. Follow up if fails to improve.  Covid is thought unlikely.

## 2021-06-14 NOTE — PROGRESS NOTES
ASSESSMENT AND PLAN:    1. Pharyngitis, unspecified etiology  Exam reveals posterior pharyngitis without adenopathy or exudate.  Offered strep test. She prefers empiric treatment.  Will treat.  Follow up if fails to improve.   - azithromycin (ZITHROMAX Z-ASHLEY) 250 MG tablet; Take 2 tablets (500 mg) on  Day 1,  followed by 1 tablet (250 mg) once daily on Days 2 through 5.  Dispense: 6 tablet; Refill: 0    Patient Instructions   1. Exam reveals pharyngitis, an inflamed back of the throat. Small but real possibility of strep.     2. Treatment with azithromycin, Zpak, take two pills today then one pill daily for 4 days.     3. Follow up if fails to improve.  Covid is thought unlikely.      CHIEF COMPLAINT:  Chief Complaint   Patient presents with     Sore Throat     started Sunday     Headache     HISTORY OF PRESENT ILLNESS:  Ambar Rodriguez is a 40 y.o. female with sore throat for 2 days.  There is mild pain with swallowing.  No dysphagia, otherwise.  No cough or fever, or chills.  No dyspnea.     Social History     Tobacco Use   Smoking Status Never Smoker   Smokeless Tobacco Never Used     Family History   Problem Relation Age of Onset     Diabetes type II Mother      Depression Mother      Arthritis Mother      Stroke Mother 60     Diabetes type II Father      Depression Father      Early death Sister         rare blood disorder     Lung cancer Maternal Grandmother      Stroke Maternal Grandmother      Throat cancer Maternal Grandfather      Diabetes Maternal Grandfather      Prostate cancer Paternal Grandfather      Depression Sister      Asthma Sister      Liver disease Sister         Primary biliary cholangitis      Past Surgical History:   Procedure Laterality Date     CHOLECYSTECTOMY       FOOT FRACTURE SURGERY Left     x3     FOOT FRACTURE SURGERY Left 2011     VITALS:  Vitals:    02/01/21 0851   BP: 142/80   Pulse: 75   Temp: (!) 96.3  F (35.7  C)   TempSrc: Tympanic   SpO2: 98%   Weight: (!) 322 lb (146.1 kg)  "    Wt Readings from Last 3 Encounters:   02/01/21 (!) 322 lb (146.1 kg)   01/21/21 (!) 322 lb 8 oz (146.3 kg)   03/11/20 (!) 314 lb (142.4 kg)     PHYSICAL EXAM:  Constitutional:  In NAD, alert and oriented  HEENT: nose clear, ears normal, posterior erythema no asymmetry or exudate  Neck: no cervical or axillary adenopathy  Cardiac:  S1 S2   Lungs: Clear to auscultation    Current Outpatient Medications   Medication Sig Dispense Refill     albuterol (PROAIR HFA;PROVENTIL HFA;VENTOLIN HFA) 90 mcg/actuation inhaler Inhale 2 puffs every 6 (six) hours as needed for wheezing. 18 g 1     blood glucose test strips Use to check blood sugars three times daily 100 strip 11     escitalopram oxalate (LEXAPRO) 20 MG tablet TAKE 1 TABLET BY MOUTH DAILY 90 tablet 1     gabapentin (NEURONTIN) 100 MG capsule TAKE 1 TO 3 CAPSULES(100  MG) BY MOUTH THREE TIMES DAILY AS NEEDED FOR ANXIETY 90 capsule 9     insulin detemir U-100 (LEVEMIR FLEXTOUCH U-100 INSULN) 100 unit/mL (3 mL) pen Inject 15 Units under the skin at bedtime. 11.9 Type 2 without complications 3 mL 3     lancets (ONETOUCH DELICA LANCETS) 33 gauge Misc Use to check blood sugars three times daily. Jasbir 100 each 11     metFORMIN (GLUCOPHAGE-XR) 500 MG 24 hr tablet TAKE ONE TABLET BY MOUTH AT BREAKFAST. INCREASE BY ONE TABLET BY MOUTH EVERY 3 DAYS UNTIL TAKING 4 TABLETS DAILY. 120 tablet 0     pen needle, diabetic (BD ULTRA-FINE DONNELL PEN NEEDLE) 32 gauge x 5/32\" Ndle Use as directed for insulin pen 90 each 1     phentermine 30 MG capsule Take 1 capsule (30 mg total) by mouth every morning. 30 capsule 1     azithromycin (ZITHROMAX Z-ASHLEY) 250 MG tablet Take 2 tablets (500 mg) on  Day 1,  followed by 1 tablet (250 mg) once daily on Days 2 through 5. 6 tablet 0     Ahmet Bautista MD  Internal Medicine  [facility]  "

## 2021-06-14 NOTE — TELEPHONE ENCOUNTER
Refill Approved    Rx renewed per Medication Renewal Policy. Medication was last renewed on 1/5/21.    Ellen Kaur, [facility] Triage/Med Refill 2/2/2021     Requested Prescriptions   Pending Prescriptions Disp Refills     metFORMIN (GLUCOPHAGE-XR) 500 MG 24 hr tablet [Pharmacy Med Name: METFORMIN ER 500MG 24HR TABS] 120 tablet 0     Sig: TAKE 1 TABLET BY MOUTH EVERY MORNING WITH BREAKFAST. INCREASE BY 1 TABLET BY MOUTH EVERY 3 DAYS UNTIL TAKING 4 TABLETS BY MOUTH WITH BREAKFAST       Metformin Refill Protocol Passed - 2/2/2021 10:46 AM        Passed - Blood pressure in last 12 months     BP Readings from Last 1 Encounters:   02/01/21 142/80             Passed - LFT or AST or ALT in last 12 months     Albumin   Date Value Ref Range Status   01/21/2021 4.1 3.5 - 5.0 g/dL Final     Bilirubin, Total   Date Value Ref Range Status   01/21/2021 0.3 0.0 - 1.0 mg/dL Final     Bilirubin, Direct   Date Value Ref Range Status   10/18/2012 <0.1 <0.6 mg/dL Final     Alkaline Phosphatase   Date Value Ref Range Status   01/21/2021 129 (H) 45 - 120 U/L Final     AST   Date Value Ref Range Status   01/21/2021 109 (H) 0 - 40 U/L Final     ALT   Date Value Ref Range Status   01/21/2021 256 (H) 0 - 45 U/L Final     Protein, Total   Date Value Ref Range Status   01/21/2021 7.5 6.0 - 8.0 g/dL Final                Passed - GFR or Serum Creatinine in last 6 months     GFR MDRD Non Af Amer   Date Value Ref Range Status   01/21/2021 >60 >60 mL/min/1.73m2 Final     GFR MDRD Af Amer   Date Value Ref Range Status   01/21/2021 >60 >60 mL/min/1.73m2 Final             Passed - Visit with PCP or prescribing provider visit in last 6 months or next 3 months     Last office visit with prescriber/PCP: Visit date not found OR same dept: 3/11/2020 Maya Ovalle CNP OR same specialty: 1/21/2021 Maya Ovalle CNP Last physical: Visit date not found Last MTM visit: Visit date not found         Next appt within 3 mo: Visit date not found  Next  physical within 3 mo: Visit date not found  Prescriber OR PCP: Jinny Garzon MD  Last diagnosis associated with med order: 1. Type 2 diabetes mellitus without complication, without long-term current use of insulin (H)  - metFORMIN (GLUCOPHAGE-XR) 500 MG 24 hr tablet [Pharmacy Med Name: METFORMIN ER 500MG 24HR TABS]; TAKE 1 TABLET BY MOUTH EVERY MORNING WITH BREAKFAST. INCREASE BY 1 TABLET BY MOUTH EVERY 3 DAYS UNTIL TAKING 4 TABLETS BY MOUTH WITH BREAKFAST  Dispense: 120 tablet; Refill: 0     If protocol passes may refill for 12 months if within 3 months of last provider visit (or a total of 15 months).           Passed - A1C in last 6 months     Hemoglobin A1c   Date Value Ref Range Status   01/21/2021 8.6 (H) <=5.6 % Final               Passed - Microalbumin in last year      Microalbumin, Random Urine   Date Value Ref Range Status   01/21/2021 1.36 0.00 - 1.99 mg/dL Final

## 2021-06-14 NOTE — TELEPHONE ENCOUNTER
RN cannot approve Refill Request    RN can NOT refill this medication Protocol failed and NO refill given. Last office visit: Visit date not found Last Physical: Visit date not found Last MTM visit: Visit date not found Last visit same specialty: 3/11/2020 Maya Ovalle, JAZMYN.  Next visit within 3 mo: Visit date not found  Next physical within 3 mo: Visit date not found      Victoria Ivan, [facility] Triage/Med Refill 1/3/2021    Requested Prescriptions   Pending Prescriptions Disp Refills     dapagliflozin (FARXIGA) 5 mg Tab 30 tablet 0     Sig: Take 5 mg by mouth daily.       There is no refill protocol information for this order

## 2021-06-15 ENCOUNTER — COMMUNICATION - HEALTHEAST (OUTPATIENT)
Dept: FAMILY MEDICINE | Facility: CLINIC | Age: 41
End: 2021-06-15

## 2021-06-15 DIAGNOSIS — F41.1 GAD (GENERALIZED ANXIETY DISORDER): ICD-10-CM

## 2021-06-15 RX ORDER — ESCITALOPRAM OXALATE 20 MG/1
TABLET ORAL
Qty: 7 TABLET | Refills: 0 | Status: SHIPPED | OUTPATIENT
Start: 2021-06-15 | End: 2022-01-10

## 2021-06-15 NOTE — TELEPHONE ENCOUNTER
Prior Authorization Request  Who s requesting:  Pharmacy  Pharmacy Name and Location: [facility]    Medication Name: Phentermine HCI 37.5 mg  Insurance Plan:   Insurance Member ID Number:    CoverMyMeds Key: [id number]  Informed patient that prior authorizations can take up to 10 business days for response:   No  Okay to leave a detailed message: Yes

## 2021-06-15 NOTE — TELEPHONE ENCOUNTER
Central PA team  [phone]  Pool: HE PA MED ([id number])          PA has been initiated.       PA form completed and faxed insurance via Cover My Meds     Key: [id number]     Medication:  Phentermine HCl 37.5MG tablets      Insurance:  [employer]         Response will be received via fax and may take up to 5-10 business days depending on plan

## 2021-06-15 NOTE — PROGRESS NOTES
Assessment & Plan   1. Type 2 diabetes mellitus without complication, without long-term current use of insulin (H)  Blood sugars in excellent control with addition of long-acting insulin.  She has done well with managing this at home and feels comfortable continuing.  Continues to focus on dietary changes as well.  Encouraged to add exercise at least once every two days. Will repeat A1C in two months at her next visit.  Reminded of need for yearly eye exam.      2. Morbid obesity (H)  Previously better success with this. Will increase to 37.5mg.  Recheck two months.    - phentermine (ADIPEX-P) 37.5 mg tablet; Take 1 tablet (37.5 mg total) by mouth daily before breakfast.  Dispense: 30 tablet; Refill: 1    3. Rash and nonspecific skin eruption  Given the distribution of the discomfort and clear drainage, consider early Shingles.  Viral swab today. Also obtained wound culture.  Treat topically until labs return.  Advised to call with any worsening redness, fever, chills.   - Varicella-Zoster Virus DNA by PCR, CSF or Skin Swab(VZDNA)  - Culture, Wound  - mupirocin (BACTROBAN) 2 % ointment; Apply to affected area 3 times daily  Dispense: 22 g; Refill: 0    4. Hepatic steatosis  Discussed treatment with diet and exercise.  Recheck labs two months.     Maya Ovalle CNP    Subjective   Chief Complaint:  Medication Management and Skin/SubQ Lesion (back right side of neck/ head)    HPI:   Ambar Rodriguez is a 40 y.o. female who presents for diabetes check    DM:  A1C last visit 8.6.  As she will be trying to conceive in the near future decided to stop Farxiga and start long acting insulin.  Started detemir 15 units bedtime. She states this has gone well.  Followed titration plan and stopped at 20 units nightly.  Blood sugars AM in 110s.  Post meal/random have been in the 120s.  She has been working hard on diet.  Typically a protein shake for breakfast. Salad for lunch. Protein, carb, veggies for dinner.      Weight  management: Started on phentermine.  She does feel that this has been effective but has not noted nearly as much of an effect as the first time she took it . Wonders if she was on a higher dose. Appetite has not been curbed nearly as much.  Denies any side effects.      Hepatic steatosis:  Liver enzymes elevated.   .  Ultrasound showed diffuse hepatic steatosis.     Skin:  Right side of neck with bump present for two days . She states it is tender and the discomfort seems to radiate down her neck and up to her ear.  No drainage. No fever, chills.     Allergies:  is allergic to alprazolam and clonazepam.    SH/FH:  Social History and Family History reviewed and updated.   Tobacco Status:  She  reports that she has never smoked. She has never used smokeless tobacco.    Review of Systems:  A complete head to toe ROS is negative unless otherwise noted in HPI    Objective   There were no vitals filed for this visit.    Physical Exam:  GENERAL: Alert, well-appearing    PSYCH: Pleasant mood, affect appropriate.    SKIN:right side of neck, hairline with 3mm erythematous papule. Clear drainage. No surrounding erythema.    HEAD: Normocephalic, atraumatic  EYES: Conjunctiva pink, sclera white, no exudates.   EARS: Canals clear without erythema, lesions, drainage. TMs pearly grey, no bulging, redness, retraction.   NOSE: Nares patent, no discharge.   MOUTH: Pharynx moist, pink without exudate. No tonsillar enlargement  NECK: No lymphadenopathy.

## 2021-06-16 PROBLEM — E66.01 MORBID OBESITY (H): Status: ACTIVE | Noted: 2021-01-21

## 2021-06-16 PROBLEM — F33.0 MILD RECURRENT MAJOR DEPRESSION (H): Status: ACTIVE | Noted: 2020-01-20

## 2021-06-16 PROBLEM — K76.0 HEPATIC STEATOSIS: Status: ACTIVE | Noted: 2021-02-23

## 2021-06-16 PROBLEM — F41.1 GAD (GENERALIZED ANXIETY DISORDER): Status: ACTIVE | Noted: 2020-01-20

## 2021-06-16 PROBLEM — E28.2 PCOS (POLYCYSTIC OVARIAN SYNDROME): Status: ACTIVE | Noted: 2020-01-20

## 2021-06-16 PROBLEM — J45.20 MILD INTERMITTENT ASTHMA WITHOUT COMPLICATION: Status: ACTIVE | Noted: 2020-07-06

## 2021-06-16 PROBLEM — E11.9 TYPE 2 DIABETES MELLITUS WITHOUT COMPLICATION, WITHOUT LONG-TERM CURRENT USE OF INSULIN (H): Status: ACTIVE | Noted: 2020-04-01

## 2021-06-16 NOTE — PROGRESS NOTES
"1. Left ear pain     2. Non-recurrent acute suppurative otitis media of left ear without spontaneous rupture of tympanic membrane  amoxicillin (AMOXIL) 875 MG tablet         Assessment & Plan     Left ear pain    Continue with use of OTC antiinflammatory medication  Recommend heating pad to jaw and under left ear PRN for the next few days    Non-recurrent acute suppurative otitis media of left ear without spontaneous rupture of tympanic membrane    - amoxicillin (AMOXIL) 875 MG tablet  Dispense: 20 tablet; Refill: 0  Finish entire prescription        13 minutes spent on the date of the encounter doing chart review, patient visit and documentation        BMI:   Estimated body mass index is 48.64 kg/m  as calculated from the following:    Height as of this encounter: 5' 8.75\" (1.746 m).    Weight as of this encounter: 327 lb (148.3 kg).   The following high BMI interventions were performed this visit: encouragement to exercise and weight monitoring    Return in about 2 weeks (around 4/19/2021), or if symptoms worsen or fail to improve.    Sapna Mancini NP  [facility]   Ambar Rodriguez is 40 y.o. and presents today for the following health issues: left ear and jaw pain   HPI patient presents reporting 1 week of left ear and jaw discomfort that she describes as a dull ache with throbbing sensation.  No known history of TMJ, she does not use a bite guard.  Her dentist has told her in the past though based on the appearance of her teeth, he does suspect that she grinds her teeth at night.  She does find it difficult to chew or bite down on food recently due to her left ear and jaw pain.  She does have 2 teeth missing in the bottom left row from previous dental issues.  She has no known abscess or recent fevers.  No significant swelling in the cheek or face.  She is a diabetic.  She denies any recent nasal congestion or drainage, no cough or wheezing symptoms.  No history of " "PE tube placement or history of perforation in the ear.  She has been medicating with Tylenol, ibuprofen, Aleve which she finds that the only medication has really relieved any of her discomfort is Excedrin Migraine.  She has not applied any ice or heat to the area.        Review of Systems        Objective    /82   Pulse 82   Temp 98.1  F (36.7  C)   Resp 18   Ht 5' 8.75\" (1.746 m)   Wt (!) 327 lb (148.3 kg)   SpO2 97%   BMI 48.64 kg/m    Body mass index is 48.64 kg/m .  Physical Exam      Review of Systems     Denies fever, chills, visual changes, fatigue, myalgias, rhinorrhea, or discharge, sore throat, swollen glands, breast mass, nipple discharge, breast changes, abdominal pain,  shortness of breath, chest pain, weight change, change in bowel habits, melena, rectal bleeding, dysuria, frequency, urgency, hematuria, polyuria, polydipsia, polyphagia, joint pain or swelling or erythema, edema, rash, weakness, paresthesias, vaginal discharge or bleeding or mood changes.       Objective:         /82   Pulse 82   Temp 98.1  F (36.7  C)   Resp 18   Ht 5' 8.75\" (1.746 m)   Wt (!) 327 lb (148.3 kg)   SpO2 97%   BMI 48.64 kg/m       Physical Exam:  General Appearance: Alert, cooperative, no distress, appears stated age  Head: Normocephalic, without obvious abnormality, atraumatic  Eyes: PERRL, conjunctiva/corneas clear, EOM's intact  Ears: Right TM is unremarkable.  Left TM with mild erythema and mild bulging.  No signs of perforation or fluid collection.  Bilateral external canals are unremarkable as well.  No signs of wax impaction.  Nose: Nares normal, septum midline,mucosa normal, no drainage  Throat: Lips, mucosa, and tongue normal; teeth and gums normal, Normal jaw alignment with opening and closing of mouth. No signs of tooth infection  Neck: Supple, symmetrical, trachea midline, no adenopathy;  thyroid: not enlarged, symmetric, no tenderness/mass/nodules; no carotid bruit or JVD  Lungs: " Clear to auscultation bilaterally, respirations unlabored  Heart: Regular rate and rhythm, S1 and S2 normal, no murmur, rub, or gallop  Skin: Skin color, texture, turgor normal, no rashes or lesions

## 2021-06-16 NOTE — TELEPHONE ENCOUNTER
Refill Approved    Rx renewed per Medication Renewal Policy. Medication was last renewed on 1/21/21.    Paulie Hernández, [facility] Triage/Med Refill 4/1/2021     Requested Prescriptions   Pending Prescriptions Disp Refills     insulin detemir U-100 (LEVEMIR FLEXTOUCH U-100 INSULN) 100 unit/mL (3 mL) pen 14 mL 1     Sig: Inject 15 Units under the skin at bedtime. 11.9 Type 2 without complications       Insulin/GLP-1 Refill Protocol Passed - 3/30/2021  2:15 PM        Passed - Visit with PCP or prescribing provider visit in last 6 months     Last office visit with prescriber/PCP: 2/17/2021 OR same dept: 2/17/2021 Maya Ovalle CNP OR same specialty: 2/17/2021 Maya Ovalle CNP Last physical: Visit date not found Last MTM visit: Visit date not found     Next appt within 3 mo: Visit date not found  Next physical within 3 mo: Visit date not found  Prescriber OR PCP: Maya Ovalle CNP  Last diagnosis associated with med order: 1. Type 2 diabetes mellitus without complication, without long-term current use of insulin (H)  - insulin detemir U-100 (LEVEMIR FLEXTOUCH U-100 INSULN) 100 unit/mL (3 mL) pen; Inject 15 Units under the skin at bedtime. 11.9 Type 2 without complications  Dispense: 3 mL; Refill: 3    If protocol passes may refill for 6 months if within 3 months of last provider visit (or a total of 9 months).              Passed - A1C in last 6 months     Hemoglobin A1c   Date Value Ref Range Status   01/21/2021 8.6 (H) <=5.6 % Final               Passed - Microalbumin in last year     Microalbumin, Random Urine   Date Value Ref Range Status   01/21/2021 1.36 0.00 - 1.99 mg/dL Final                  Passed - Blood pressure in last year     BP Readings from Last 1 Encounters:   02/17/21 118/70             Passed - Creatinine done in last year     Creatinine   Date Value Ref Range Status   01/21/2021 0.72 0.60 - 1.10 mg/dL Final

## 2021-06-16 NOTE — PROGRESS NOTES
Assessment & Plan   1. Benign paroxysmal positional vertigo due to bilateral vestibular disorder  History and exam consistent with BPPV.  No concerning neurologic symptoms.  Trial of meclizine. Discussed vestibular therapy.  She does work with a PT group so will have this performed at work, encouraged to reach out if she would like a referral.    - meclizine (ANTIVERT) 25 mg tablet; Take 1 tablet (25 mg total) by mouth 3 (three) times a day as needed for dizziness or nausea.  Dispense: 30 tablet; Refill: 1    2. Acute suppurative otitis media of left ear without spontaneous rupture of tympanic membrane, recurrence not specified  She does have some persistent erythema and fulness on exam and complaints of persistent pain . Treat with Augmentin.  Recommended addition of nasal corticosteroid as well for seasonal sinus congestion that may be contributing.   - amoxicillin-clavulanate (AUGMENTIN) 875-125 mg per tablet; Take 1 tablet by mouth 2 (two) times a day for 7 days.  Dispense: 14 tablet; Refill: 0    3. Type 2 diabetes mellitus without complication, without long-term current use of insulin (H)  Fasting blood sugars above goal.  Increase by 2 units every 2-3 days until consistently <130.  Follow up one month.     Maya Ovalle CNP    Subjective   Chief Complaint:  Dizziness (started yesterday, had ear infection 2 weeks ago)    HPI:   Ambar Rodriguez is a 40 y.o. female who presents for dizziness.     She is here today with symptoms of vertigo.  States she does have a history of vertigo in the past. Episodes previously quite mild. This current episode has lasted for two days so she felt she should come in for evaluation.     Describes sensation of the room spinning, particularly with head movements.  Nausea though no vomiting.  No headache, vision changes, weakness, numbness, tingling.  Ear infection treated 4/5 with amoxicillin.  States she noted initial improvement with this though never completely resolved and  "within the last few days has noted worsening throbbing pain in the ear.  Radiates behind the ear as well.  No drainage.      DM:  Blood sugars have been 130s-140s the last week. No lows. Continues on Levemir 15 units at bedtime. Metformin max dose.       Allergies:  is allergic to alprazolam and clonazepam.    SH/FH:  Social History and Family History reviewed and updated.   Tobacco Status:  She  reports that she has never smoked. She has never used smokeless tobacco.    Review of Systems:  A complete head to toe ROS is negative unless otherwise noted in HPI    Objective     Vitals:    04/19/21 1042   BP: 128/80   Pulse: 70   SpO2: 96%   Weight: (!) 328 lb (148.8 kg)   Height: 5' 8.75\" (1.746 m)       Physical Exam:  GENERAL: Alert, well-appearing   EYES:  EOMs intact. Nystagmus noted with lateral gaze.    EARS: Left TM with mild erythema, effusion.  No perforation.  Right TM pearly grey, no bulging, redness, retraction.   MOUTH: Pharynx moist, pink without exudate. No tonsillar enlargement  NECK: No lymphadenopathy.   CV: Regular rate and rhythm without murmurs, rubs or gallops.  RESP: Lung sounds clear  NEURO: Positive [place]-Hallpike maneuver.  CNs 2-12 intact. DTRs 2/4. Normal motor and sensory          "

## 2021-06-17 ENCOUNTER — OFFICE VISIT - HEALTHEAST (OUTPATIENT)
Dept: FAMILY MEDICINE | Facility: CLINIC | Age: 41
End: 2021-06-17

## 2021-06-17 DIAGNOSIS — F33.0 MILD RECURRENT MAJOR DEPRESSION (H): ICD-10-CM

## 2021-06-17 DIAGNOSIS — G43.009 MIGRAINE WITHOUT AURA AND WITHOUT STATUS MIGRAINOSUS, NOT INTRACTABLE: ICD-10-CM

## 2021-06-17 DIAGNOSIS — E11.9 TYPE 2 DIABETES MELLITUS WITHOUT COMPLICATION, WITHOUT LONG-TERM CURRENT USE OF INSULIN (H): ICD-10-CM

## 2021-06-17 DIAGNOSIS — E66.01 MORBID OBESITY (H): ICD-10-CM

## 2021-06-17 DIAGNOSIS — F41.1 GAD (GENERALIZED ANXIETY DISORDER): ICD-10-CM

## 2021-06-17 LAB
ALBUMIN SERPL-MCNC: 3.7 G/DL (ref 3.5–5)
ALP SERPL-CCNC: 137 U/L (ref 45–120)
ALT SERPL W P-5'-P-CCNC: 234 U/L (ref 0–45)
ANION GAP SERPL CALCULATED.3IONS-SCNC: 11 MMOL/L (ref 5–18)
AST SERPL W P-5'-P-CCNC: 106 U/L (ref 0–40)
BILIRUB SERPL-MCNC: 0.3 MG/DL (ref 0–1)
BUN SERPL-MCNC: 12 MG/DL (ref 8–22)
CALCIUM SERPL-MCNC: 9.2 MG/DL (ref 8.5–10.5)
CHLORIDE BLD-SCNC: 100 MMOL/L (ref 98–107)
CO2 SERPL-SCNC: 27 MMOL/L (ref 22–31)
CREAT SERPL-MCNC: 0.65 MG/DL (ref 0.6–1.1)
GFR SERPL CREATININE-BSD FRML MDRD: >60 ML/MIN/1.73M2
GLUCOSE BLD-MCNC: 255 MG/DL (ref 70–125)
HBA1C MFR BLD: 8.9 %
POTASSIUM BLD-SCNC: 4.5 MMOL/L (ref 3.5–5)
PROT SERPL-MCNC: 6.8 G/DL (ref 6–8)
SODIUM SERPL-SCNC: 138 MMOL/L (ref 136–145)

## 2021-06-17 RX ORDER — SEMAGLUTIDE 1.34 MG/ML
0.25 INJECTION, SOLUTION SUBCUTANEOUS
Qty: 3 ML | Refills: 3 | Status: SHIPPED | OUTPATIENT
Start: 2021-06-17 | End: 2021-12-16

## 2021-06-17 RX ORDER — TRAMADOL HYDROCHLORIDE 50 MG/1
50 TABLET ORAL EVERY 6 HOURS PRN
Qty: 5 TABLET | Refills: 0 | Status: SHIPPED | OUTPATIENT
Start: 2021-06-17 | End: 2022-01-10

## 2021-06-17 RX ORDER — ESCITALOPRAM OXALATE 20 MG/1
30 TABLET ORAL DAILY
Qty: 135 TABLET | Refills: 1 | Status: SHIPPED | OUTPATIENT
Start: 2021-06-17 | End: 2022-01-10

## 2021-06-17 ASSESSMENT — ANXIETY QUESTIONNAIRES
5. BEING SO RESTLESS THAT IT IS HARD TO SIT STILL: MORE THAN HALF THE DAYS
1. FEELING NERVOUS, ANXIOUS, OR ON EDGE: NEARLY EVERY DAY
7. FEELING AFRAID AS IF SOMETHING AWFUL MIGHT HAPPEN: MORE THAN HALF THE DAYS
GAD7 TOTAL SCORE: 16
IF YOU CHECKED OFF ANY PROBLEMS ON THIS QUESTIONNAIRE, HOW DIFFICULT HAVE THESE PROBLEMS MADE IT FOR YOU TO DO YOUR WORK, TAKE CARE OF THINGS AT HOME, OR GET ALONG WITH OTHER PEOPLE: EXTREMELY DIFFICULT
6. BECOMING EASILY ANNOYED OR IRRITABLE: MORE THAN HALF THE DAYS
4. TROUBLE RELAXING: NEARLY EVERY DAY
3. WORRYING TOO MUCH ABOUT DIFFERENT THINGS: MORE THAN HALF THE DAYS
2. NOT BEING ABLE TO STOP OR CONTROL WORRYING: MORE THAN HALF THE DAYS

## 2021-06-17 ASSESSMENT — PATIENT HEALTH QUESTIONNAIRE - PHQ9: SUM OF ALL RESPONSES TO PHQ QUESTIONS 1-9: 16

## 2021-06-17 NOTE — TELEPHONE ENCOUNTER
Telephone Encounter by Emi Encarnacion at 2/23/2021  4:29 PM     Author: Emi Encarnacion Service: -- Author Type: --    Filed: 2/23/2021  4:30 PM Encounter Date: 2/17/2021 Status: Signed    : Emi Encarnacion APPROVED:    Approval start date: 1/20/2021  Approval end date:  5/20/2021    Pharmacy has been notified of approval and will contact patient when medication is ready for pickup.

## 2021-06-20 NOTE — LETTER
Letter by Maay Ovalle CNP at      Author: Maya Ovalle CNP Service: -- Author Type: --    Filed:  Encounter Date: 4/1/2020 Status: (Other)         April 1, 2020     Patient: Ambar Rodriguez   YOB: 1980   Date of Visit: 4/1/2020       To Whom It May Concern:    It is my medical opinion that Ambar Rodriguez should remain out of work for one month to avoid possible exposures to COVID.  She has a medical condition that suppresses her immune system and makes her more susceptible to complications.  It is in the best interest of her health that she be able to continue her leave for one month.      If you have any questions or concerns, please don't hesitate to call.    Sincerely,        Electronically signed by Maya Ovalle CNP

## 2021-06-20 NOTE — LETTER
Letter by Maya Ovalle CNP at      Author: Maya Ovalle CNP Service: -- Author Type: --    Filed:  Encounter Date: 4/1/2020 Status: (Other)                    My Depression Action Plan  Name: Ambar Rodriguez   Date of Birth 1980  Date: 4/1/2020    My Doctor: Maya Ovalle CNP   My Clinic: [facility]  [address] [facility] [address]  [phone]          GREEN    ZONE   Good Control    What it looks like:     Things are going generally well. You have normal ups and downs. You may even feel depressed from time to time, but bad moods usually last less than a day.   What you need to do:  1. Continue to care for yourself (see self care plan)  2. Check your depression survival kit and update it as needed  3. Follow your physicians recommendations including any medication.  4. Do not stop taking medication unless you consult with your physician first.           YELLOW         ZONE Getting Worse    What it looks like:     Depression is starting to interfere with your life.     It may be hard to get out of bed; you may be starting to isolate yourself from others.    Symptoms of depression are starting to last most all day and this has happened for several days.     You may have suicidal thoughts but they are not constant.   What you need to do:     1. Call your care team. Your response to treatment will improve if you keep your care team informed of your progress. Yellow periods are signs an adjustment may need to be made.     2. Continue your self-care.  Just get dressed and ready for the day.  Don't give yourself time to talk yourself out of it.    3. Talk to someone in your support network.    4. Open up your depression Depression Self-Care Plan / Wellness kit.           RED    ZONE Medical Alert - Get Help    What it looks like:     Depression is seriously interfering with your life.     You may experience these or other symptoms: You cant get out of bed most days, cant work or  engage in other necessary activities, you have trouble taking care of basic hygiene, or basic responsibilities, thoughts of suicide or death that will not go away, self-injurious behavior.     What you need to do:  1. Call your care team and request a same-day appointment. If they are not available (weekends or after hours) call your local crisis line, emergency room or 911.            Self-Care Plan / Wellness Kit    Self-Care for Depression  Heres the deal. Your body and mind are really not as separate as most people think.  What you do and think affects how you feel and how you feel influences what you do and think. This means if you do things that people who feel good do, it will help you feel better.  Sometimes this is all it takes.  There is also a place for medication and therapy depending on how severe your depression is, so be sure to consult with your medical provider and/ or Behavioral Health Consultant if your symptoms are worsening or not improving.     In order to better manage my stress, I will:    Exercise  Get some form of exercise, every day. This will help reduce pain and release endorphins, the feel good chemicals in your brain. This is almost as good as taking antidepressants!  This is not the same as joining a gym and then never going! (they count on that by the way?) It can be as simple as just going for a walk or doing some gardening, anything that will get you moving.      Hygiene   Maintain good hygiene (get out of bed in the morning, make your bed, brush your teeth, take a shower, and get dressed like you were going to work, even if you are unemployed).  If your clothes don't fit try to get ones that do.    Diet  Strive to eat foods that are good for me, drink plenty of water, and avoid excessive sugar, caffeine, alcohol, and other mood-altering substances.  Some foods that are helpful in depression are: complex carbohydrates, B vitamins, flaxseed, fish or fish oil, fresh fruits and  vegetables.    Psychotherapy  Agree to participate in Individual Therapy (if recommended).    Medication  If prescribed medications, I agree to take them.  Missing doses can result in serious side effects.  I understand that drinking alcohol, or other illicit drug use, may cause potential side effects.  I will not stop my medication abruptly without first discussing it with my provider.    Staying Connected With Others  Stay in touch with my friends, family members, and my primary care provider/team.    Use your imagination  Be creative.  We all have a creative side; it doesnt matter if its oil painting, sand castles, or mud pies! This will also kick up the endorphins.    Witness Beauty  (AKA stop and smell the roses) Take a look outside, even in mid-winter. Notice colors, textures. Watch the squirrels and birds.     Service to others  Be of service to others.  There is always someone else in need.  By helping others we can get out of ourselves and remember the really important things.  This also provides opportunities for practicing all the other parts of the program.    Humor  Laugh and be silly!  Adjust your TV habits for less news and crime-drama and more comedy.    Control your stress  Try breathing deep, massage therapy, biofeedback, and meditation. Find time to relax each day.     Crisis Text Line  http://www.crisistextline.org    The Crisis Text Line serves anyone, in any type of crisis, providing access to free, 24/7 support and information via the medium people already use and trust:    Here's how it works:  1.  Text 923-479 from anywhere in the [place], anytime, about any type of crisis.  2.  A live, trained Crisis Counselor receives the text and responds quickly.  3.  The volunteer Crisis Counselor will help you move from a 'hot moment to a cool moment'.  My support system    Clinic Contact: Maya Ovalle DNp Phone number: [phone]   Contact 1:  Phone number:    Contact 2:  Phone number:     [employer]/:  Phone number:    Therapist:  Phone number:    [facility] crisis center:    Phone number:    Other community support:  Phone number:

## 2021-06-20 NOTE — LETTER
Letter by Maya Ovalle CNP at      Author: Maya Ovalle CNP Service: -- Author Type: --    Filed:  Encounter Date: 4/22/2020 Status: (Other)       My Asthma Action Plan     Name: Ambar Rodriguez   YOB: 1980  Date: 4/22/2020   My doctor: Maya Ovalle CNP   My clinic: [facility]/OB         My Rescue Medicine:   Albuterol (Proair/Ventolin/Proventil HFA) 2-4 puffs EVERY 4 HOURS as needed. Use a spacer if recommended by your provider.   My Asthma Severity:   Intermittent/Exercise Induced  Know your asthma triggers: upper respiratory infections             GREEN ZONE   Good Control    I feel good    No cough or wheeze    Can work, sleep and play without asthma symptoms     Take your asthma control medicine every day.     1. If exercise triggers your asthma, take your rescue medication    15 minutes before exercise or sports, and    During exercise if you have asthma symptoms  2. Spacer to use with inhaler: If you have a spacer, make sure to use it with your inhaler             YELLOW ZONE Getting Worse  I have ANY of these:    I do not feel good    Cough or wheeze    Chest feels tight    Wake up at night 1. Keep taking your Green Zone medications  2. Start taking your rescue medicine:    every 20 minutes for up to 1 hour. Then every 4 hours for 24-48 hours.  3. If you stay in the Yellow Zone for more than 12-24 hours, contact your doctor.  4. If you do not return to the Green Zone in 12-24 hours or you get worse, start taking your oral steroid medicine if prescribed by your provider.           RED ZONE Medical Alert - Get Help  I have ANY of these:    I feel awful    Medicine is not helping    Breathing getting harder    Trouble walking or talking    Nose opens wide to breathe     1. Take your rescue medicine NOW  2. If your provider has prescribed an oral steroid medicine, start taking it NOW  3. Call your doctor NOW  4. If you are still in the Red Zone after 20 minutes and you have not  reached your doctor:    Take your rescue medicine again and    Call 911 or go to the emergency room right away    See your regular doctor within 2 weeks of an Emergency Room or Urgent Care visit for follow-up treatment.          Annual Reminders:  Meet with Asthma Educator,  Flu Shot in the Fall, consider Pneumonia Vaccination for patients with asthma (aged 19 and older).    Pharmacy:   [id number] [address] & [address] [address]  Phone: [phone] Fax: [phone]    [facility] - [place], [place] - [address]  [address] [address]  Phone: [phone] Fax: [phone]      Electronically signed by Maya Ovalle CNP   Date: 04/22/20                      Asthma Triggers  How To Control Things That Make Your Asthma Worse    Triggers are things that make your asthma worse.  Look at the list below to help you find your triggers and what you can do about them.  You can help prevent asthma flare-ups by staying away from your triggers.      Trigger                                                          What you can do   Cigarette Smoke  Tobacco smoke can make asthma worse. Do not allow smoking in your home, car or around you.  Be sure no one smokes at a [facility] day care or school.  If you smoke, ask your health care provider for ways to help you quit.  Ask family members to quit too.  Ask your health care provider for a referral to Quit Plan to help you quit smoking, or call 9-844-376-PLAN.     Colds, Flu, Bronchitis  These are common triggers of asthma. Wash your hands often.  Dont touch your eyes, nose or mouth.  Get a flu shot every year.     Dust Mites  These are tiny bugs that live in cloth or carpet. They are too small to see. Wash sheets and blankets in hot water every week.   Encase pillows and mattress in dust mite proof covers.  Avoid having carpet if you can.  If you have carpet, vacuum weekly.   Use a dust mask and HEPA vacuum.   Pollen and Outdoor Mold  Some people are allergic to trees, grass, or weed pollen, or molds. Try to keep your windows closed.  Limit time out doors when pollen count is high.   Ask you health care provider about taking medicine during allergy season.     Animal Dander  Some people are allergic to skin flakes, urine or saliva from pets with fur or feathers. Keep pets with fur or feathers out of your home.    If you cant keep the pet outdoors, then keep the pet out of your bedroom.  Keep the bedroom door closed.  Keep pets off cloth furniture and away from stuffed toys.     Mice, Rats, and Cockroaches  Some people are allergic to the waste from these pests.   Cover food and garbage.  Clean up spills and food crumbs.  Store grease in the refrigerator.   Keep food out of the bedroom.   Indoor Mold  This can be a trigger if your home has high moisture. Fix leaking faucets, pipes, or other sources of water.   Clean moldy surfaces.  Dehumidify basement if it is damp and smelly.   Smoke, Strong Odors, and Sprays  These can reduce air quality. Stay away from strong odors and sprays, such as perfume, powder, hair spray, paints, smoke incense, paint, cleaning products, candles and new carpet.   Exercise or Sports  Some people with asthma have this trigger. Be active!  Ask your doctor about taking medicine before sports or exercise to prevent symptoms.    Warm up for 5-10 minutes before and after sports or exercise.     Other Triggers of Asthma  Cold air:  Cover your nose and mouth with a scarf.  Sometimes laughing or crying can be a trigger.  Some medicines and food can trigger asthma.

## 2021-06-20 NOTE — LETTER
Letter by Maya Ovalle CNP at      Author: Maya Ovalle CNP Service: -- Author Type: --    Filed:  Encounter Date: 4/22/2020 Status: (Other)         April 22, 2020     Patient: Ambar Rodriguez   YOB: 1980   Date of Visit: 4/22/2020       To Whom It May Concern:    It is my medical opinion that Ambar Rodriguez is at high risk for complications of COVID due to underlying diagnoses of asthma and diabetes.  It is recommended she minimize any potential exposure by remaining out of a face-to-face work environment.  We have discussed extending leave another two months based on the current COVID climate in [place].     If you have any questions or concerns, please don't hesitate to call.    Sincerely,        Electronically signed by Maya Ovalle CNP

## 2021-06-20 NOTE — LETTER
Letter by Maya Ovalle CNP at      Author: Maya Ovalle CNP Service: -- Author Type: --    Filed:  Encounter Date: 3/18/2020 Status: (Other)         March 19, 2020     Patient: Ambar Rodriguez   YOB: 1980   Date of Visit: 3/18/2020       To Whom It May Concern:    It is my medical opinion that Ambar Rodriguez is considered to be high risk for both contraction of COVID-19 as well as complications of COVID-19 due to underlying health conditions that weaken her immune system.  It is recommended that she isolate herself as much as possible and therefore it would be of her benefit to take two weeks off of work (or work from home) to avoid contact.     If you have any questions or concerns, please don't hesitate to call.    Sincerely,        Electronically signed by Maya Ovalle CNP

## 2021-06-20 NOTE — LETTER
Letter by Maya Ovalle CNP at      Author: Maya Ovalle CNP Service: -- Author Type: --    Filed:  Encounter Date: 1/27/2020 Status: (Other)       Ambar Rodriguez  [address] [place] [address]    January 27, 2020    Dear Ambar    In reviewing your records, we have determined a gap in your preventive services. Based on your age and health history, we recommend the follow service.     ? General Physical  ? Physical with a Pap Smear  ? Colon cancer screening  ? Mammogram  ? Immunization  ? Diabetic check  ? Blood pressure/cardiovascular check  ? Asthma check  ? Cholesterol test  ? Lab work  ? Med check    If you have had the service elsewhere, please contact us so we can update our records. Please let us know if you have transferred your care to another clinic.    Please call [phone] to schedule this appointment.    We believe that a strong preventive care program, including regular physicals and follow-up care is an important part of a healthy lifestyle and we are committed to helping you maintain your health.    Thank you for choosing us as your health care provider.    Sincerely,   [facility]  [address] [facility] [address]  Phone Number:  [phone]

## 2021-06-20 NOTE — LETTER
Letter by Jinny Garzon MD at      Author: Jinny Garzon MD Service: -- Author Type: --    Filed:  Encounter Date: 7/2/2020 Status: (Other)       [facility]   [address] [place] [address]  [facility] [address]  [phone]    July 2, 2020      Ambar Rodriguez  [address] [place] [address]      Dear Ambar,    As your health care provider, I am concerned that your underlying medical condition puts you at particularly high risk for serious complications should you contract a COVID-19 infection.     Specifically, you have the following conditions/diagnoses, included as high risk conditions per the Centers for Disease Control and Prevention at CDC.gov.     Type 2 diabetes  Asthma    COVID-19 is a new disease and there is limited information regarding risk factors for severe disease. Based on currently available information and clinical expertise,  people of any age who have serious underlying medical conditions might be at higher risk for severe illness from COVID-19.     It is my medical opinion that you should continue to  avoid close contact with others and continue to  work from home if possible during this COVID-19 pandemic.     Jinny Garzon MD      [facility]

## 2021-06-21 ENCOUNTER — COMMUNICATION - HEALTHEAST (OUTPATIENT)
Dept: FAMILY MEDICINE | Facility: CLINIC | Age: 41
End: 2021-06-21

## 2021-06-21 NOTE — LETTER
Letter by Ahmet Bautista MD at      Author: Ahmet Bautista MD Service: -- Author Type: --    Filed:  Encounter Date: 2/1/2021 Status: (Other)         February 1, 2021     Patient: Ambar Rodriguez   YOB: 1980   Date of Visit: 2/1/2021       To Whom It May Concern:    It is my medical opinion that Ambar Rodriguez has been ill and unable to work, since February 1.  She can return to work without restrictions on February 3, 2021. .    If you have any questions or concerns, please don't hesitate to call.    Sincerely,        Electronically signed by Ahmet Bautista MD

## 2021-06-25 ENCOUNTER — COMMUNICATION - HEALTHEAST (OUTPATIENT)
Dept: FAMILY MEDICINE | Facility: CLINIC | Age: 41
End: 2021-06-25

## 2021-06-25 DIAGNOSIS — E11.9 TYPE 2 DIABETES MELLITUS WITHOUT COMPLICATION, WITHOUT LONG-TERM CURRENT USE OF INSULIN (H): ICD-10-CM

## 2021-06-25 NOTE — TELEPHONE ENCOUNTER
Refill Approved    Rx renewed per Medication Renewal Policy. Medication was last renewed on 12/29/20.    Paulie Hernández, [facility] Triage/Med Refill 6/11/2021     Requested Prescriptions   Pending Prescriptions Disp Refills     escitalopram oxalate (LEXAPRO) 20 MG tablet [Pharmacy Med Name: ESCITALOPRAM TABS 20MG] 90 tablet 3     Sig: TAKE 1 TABLET DAILY       [facility] Refill Protocol  Passed - 6/9/2021 10:15 PM        Passed - PCP or prescribing provider visit in last year     Last office visit with prescriber/PCP: 4/19/2021 Maya Ovalle CNP OR same dept: Visit date not found OR same specialty: 4/19/2021 Maya Ovalle CNP  Last physical: 1/20/2020 Last MTM visit: Visit date not found   Next visit within 3 mo: Visit date not found  Next physical within 3 mo: Visit date not found  Prescriber OR PCP: Maya Ovalle CNP  Last diagnosis associated with med order: 1. AMANDA (generalized anxiety disorder)  - escitalopram oxalate (LEXAPRO) 20 MG tablet [Pharmacy Med Name: ESCITALOPRAM TABS 20MG]; TAKE 1 TABLET DAILY  Dispense: 90 tablet; Refill: 3    If protocol passes may refill for 12 months if within 3 months of last provider visit (or a total of 15 months).

## 2021-06-25 NOTE — TELEPHONE ENCOUNTER
Refill Approved    Rx renewed per Medication Renewal Policy. Medication was last renewed on 5/26/20.    Paulie Hernández, [facility] Triage/Med Refill 6/8/2021     Requested Prescriptions   Pending Prescriptions Disp Refills     gabapentin (NEURONTIN) 100 MG capsule [Pharmacy Med Name: GABAPENTIN 100 MG CAPSULE] 90 capsule 6     Sig: TAKE 1-3 CAPSULE BY MOUTH THREE TIMES DAILY AS NEEDED FOR ANXIETY       Gabapentin/Levetiracetam/Tiagabine Refill Protocol  Passed - 6/7/2021  5:05 PM        Passed - PCP or prescribing provider visit in past 12 months or next 3 months     Last office visit with prescriber/PCP: 4/19/2021 Maya Ovalle CNP OR same dept: 4/19/2021 Maya Ovalle CNP OR same specialty: 4/19/2021 Maya Ovalle CNP  Last physical: 1/20/2020 Last MTM visit: Visit date not found   Next visit within 3 mo: Visit date not found  Next physical within 3 mo: Visit date not found  Prescriber OR PCP: Maya Ovalle CNP  Last diagnosis associated with med order: 1. AMANDA (generalized anxiety disorder)  - gabapentin (NEURONTIN) 100 MG capsule [Pharmacy Med Name: GABAPENTIN 100 MG CAPSULE]; TAKE 1-3 CAPSULE BY MOUTH THREE TIMES DAILY AS NEEDED FOR ANXIETY  Dispense: 90 capsule; Refill: 6    If protocol passes may refill for 12 months if within 3 months of last provider visit (or a total of 15 months).

## 2021-06-26 ENCOUNTER — HEALTH MAINTENANCE LETTER (OUTPATIENT)
Age: 41
End: 2021-06-26

## 2021-06-26 NOTE — PROGRESS NOTES
Assessment & Plan   1. Type 2 diabetes mellitus without complication, without long-term current use of insulin (H)  Remains uncontrolled.  Significant overeating secondary to anxiety.  Discussed options. Have been avoiding medications not safe in pregnancy d/t intention to try within the next few months but today we discussed her weight and diabetes and she is in agreement that we need to focus on getting her health in line before it would be safe to try to conceive.  Will start Ozempic in addition to Metformin and insulin.  Plan to taper down on insulin the future if possible. This is likely leading to weight gain.  Counseled on risks, benefits, side effects.  No contraindications to use.  Follow up one month.   - Glycosylated Hemoglobin A1c  - Comprehensive Metabolic Panel  - escitalopram oxalate (LEXAPRO) 20 MG tablet; Take 1.5 tablets (30 mg total) by mouth daily.  Dispense: 135 tablet; Refill: 1  - semaglutide (OZEMPIC) 0.25 mg or 0.5 mg(2 mg/1.5 mL) PnIj; Inject 0.25 mg under the skin every 7 days. After one month, increase dose to 0.5mg every 7 days  Dispense: 3 mL; Refill: 3    2. AMANDA (generalized anxiety disorder)  Previously well controlled with Lexapro.  Significant situational anxiety with planning her wedding.  Gabapentin has not been effective and likely leading to additional weight gain so will taper off. Given schedule for this. Will increase Lexapro to 30mg and follow up one month.     3. Mild recurrent major depression (H)  Depression has been well controlled.      4. Migraine without aura and without status migrainosus, not intractable  Occasional severe HA every couple of months for which she would like additional medication options.  Sumatriptan in the past though was not on Lexapro at that time.  As she is now on high dose selective serotonin reuptake inhibitor counseled on risks of serotonin syndrome and she would prefer to avoid this.  Small prescription for Tramadol for severe HA  - traMADoL  (ULTRAM) 50 mg tablet; Take 1 tablet (50 mg total) by mouth every 6 (six) hours as needed for pain.  Dispense: 5 tablet; Refill: 0    5. Morbid obesity (H)  Focus on improving anxiety to help with overeating.  Discontinue gabapentin.  Start GLP-1    Maya Ovalle CNP     Total Time: 40 minutes spent on the day of the encounter doing chart review, patient visit, documentation and consultation.     Subjective   Chief Complaint:  Diabetes (f/u ) and Anxiety    HPI:   Ambar Rodriguez is a 40 y.o. female who presents for follow up    Diabetes:  A1C today 8.9 up from 8.6 in January. She states she has been doing a lot of stress eating. Very anxious (see below). States her blood sugars have been AM 140s.  Post meal 180s.  Doing 30 units insulin at night.       Obesity:  Started on phentermine earlier this year, increase to 37.5mg dose.  She felt this was not doing anything and causing some increased anxiety so stopped the medication a month ago.     Anxiety:  Lexapro 20mg.  Feeling highly anxious with wedding planning.  Wedding is three months away.  Cannot calm her mind. Racing thoughts.  Feeling physical symptoms of anxiety.  Panic attacks.  Lots of stress eating in the evenings.  Is taking gabapentin three times a day but feels like it is not doing much.      Migraine:  1-2x/month.  Occasionally more severe migraine for which she wishes she had a medication.  Took sumatriptan in distant past and was effective.        Allergies:  is allergic to alprazolam and clonazepam.    SH/FH:  Social History and Family History reviewed and updated.   Tobacco Status:  She  reports that she has never smoked. She has never used smokeless tobacco.    Review of Systems:  A complete head to toe ROS is negative unless otherwise noted in HPI    Objective   There were no vitals filed for this visit.    Physical Exam:  GENERAL: Alert, well-appearing    PSYCH: Appears anxious.  Tearful at times.  Thought processes congruent.  Good insight and  judgment.

## 2021-06-26 NOTE — PATIENT INSTRUCTIONS - HE
Recommendations from today's visit                                                       1. Anxiety:  We will increase Lexapro to 30mg.     2. Gabapentin:  Decrease to two capsules three times a day for one week.  Then decrease to one capsule three times a day for one week. Then eliminate one capsule every two days until done.      3. Diabetes:  We will start you on a GLP-1 called Ozempic.  This is an injection once a week.  Start at 0.25mg for one month and then increase to 0.5mg.    4. Migraine:  I sent in a small number of Tramadol to have on hand for severe headache.     Next appointment: one month    To reschedule your appointment, please call the clinic directly at [phone].   It was a pleasure seeing you today! I look forward to seeing you again.

## 2021-07-03 NOTE — ADDENDUM NOTE
Addendum Note by Shun Medina CNP at 1/20/2020 10:40 AM     Author: Shun Medina CNP Service: -- Author Type: Nurse Practitioner    Filed: 1/20/2020  3:51 PM Encounter Date: 1/20/2020 Status: Signed    : Shun Medina CNP (Nurse Practitioner)    Addended by: SHUN MEDINA on: 1/20/2020 03:51 PM        Modules accepted: Orders

## 2021-07-03 NOTE — ADDENDUM NOTE
Addendum Note by Shun Medina CNP at 1/20/2020 10:40 AM     Author: Shun Medina CNP Service: -- Author Type: Nurse Practitioner    Filed: 1/20/2020  4:43 PM Encounter Date: 1/20/2020 Status: Signed    : Shun Medina CNP (Nurse Practitioner)    Addended by: SHUN MEDINA on: 1/20/2020 04:43 PM        Modules accepted: Orders

## 2021-07-06 VITALS
SYSTOLIC BLOOD PRESSURE: 122 MMHG | TEMPERATURE: 97.8 F | BODY MASS INDEX: 49.37 KG/M2 | DIASTOLIC BLOOD PRESSURE: 78 MMHG | WEIGHT: 293 LBS | HEART RATE: 76 BPM | OXYGEN SATURATION: 96 %

## 2021-07-06 ASSESSMENT — PATIENT HEALTH QUESTIONNAIRE - PHQ9: SUM OF ALL RESPONSES TO PHQ QUESTIONS 1-9: 16

## 2021-07-07 NOTE — TELEPHONE ENCOUNTER
"RN cannot approve Refill Request    RN can NOT refill this medication RN unable to sign as provider needs to send an alternative or chose an alternative. Last office visit: 6/17/2021 Maya Ovalle CNP Last Physical: 1/20/2020 Last MTM visit: Visit date not found Last visit same specialty: 6/17/2021 Maya Ovalle CNP.  Next visit within 3 mo: Visit date not found  Next physical within 3 mo: Visit date not found      Roberta Pina, [facility] Triage/Med Refill 6/25/2021    Requested Prescriptions   Pending Prescriptions Disp Refills     pen needle, diabetic (BD ULTRA-FINE DONNELL PEN NEEDLE) 32 gauge x 5/32\" Ndle 90 each 1     Sig: Use as directed for insulin pen       Diabetic Supplies Refill Protocol Passed - 6/25/2021  1:52 PM        Passed - Visit with PCP or prescribing provider visit in last 6 months     Last office visit with prescriber/PCP: 6/17/2021 Maya Ovalle CNP OR same dept: 6/17/2021 Maya Ovalle CNP OR same specialty: 6/17/2021 Maya Ovalle CNP  Last physical: 1/20/2020 Last MTM visit: Visit date not found   Next visit within 3 mo: Visit date not found  Next physical within 3 mo: Visit date not found  Prescriber OR PCP: Maya Ovalle CNP  Last diagnosis associated with med order: 1. Type 2 diabetes mellitus without complication, without long-term current use of insulin (H)  - pen needle, diabetic (BD ULTRA-FINE DONNELL PEN NEEDLE) 32 gauge x 5/32\" Ndle; Use as directed for insulin pen  Dispense: 90 each; Refill: 1    If protocol passes may refill for 12 months if within 3 months of last provider visit (or a total of 15 months).             Passed - A1C in last 6 months     Hemoglobin A1c   Date Value Ref Range Status   06/17/2021 8.9 (H) <=5.6 % Final                     "

## 2021-07-08 ASSESSMENT — ANXIETY QUESTIONNAIRES: GAD7 TOTAL SCORE: 16

## 2021-07-09 NOTE — TELEPHONE ENCOUNTER
Telephone Encounter by Keren Zhu CMA at 2/19/2021 11:11 AM     Author: Keren Zhu CMA Service: -- Author Type: Certified Medical Assistant    Filed: 7/9/2021 11:09 AM Encounter Date: 2/19/2021 Status: Signed    : Keren Zhu CMA (Certified Medical Assistant)       Prior Authorization Request  Whos requesting:  Pharmacy  Pharmacy Name and Location: [facility]   Medication Name: Phentermine HCL 37.5 MG   Insurance Plan: NA  Insurance Member ID Number:  NA  CoverMyMeds Key: [id number]  Informed patient that prior authorizations can take up to 10 business days for response:   No  Okay to leave a detailed message: Josie Zhu CMA

## 2021-08-02 PROCEDURE — 93005 ELECTROCARDIOGRAM TRACING: CPT | Performed by: EMERGENCY MEDICINE

## 2021-08-02 PROCEDURE — 96375 TX/PRO/DX INJ NEW DRUG ADDON: CPT

## 2021-08-02 PROCEDURE — 93005 ELECTROCARDIOGRAM TRACING: CPT

## 2021-08-02 PROCEDURE — 99285 EMERGENCY DEPT VISIT HI MDM: CPT | Mod: 25

## 2021-08-02 PROCEDURE — 96376 TX/PRO/DX INJ SAME DRUG ADON: CPT

## 2021-08-02 PROCEDURE — 96374 THER/PROPH/DIAG INJ IV PUSH: CPT | Mod: 59

## 2021-08-03 ENCOUNTER — APPOINTMENT (OUTPATIENT)
Dept: RADIOLOGY | Facility: CLINIC | Age: 41
End: 2021-08-03
Attending: EMERGENCY MEDICINE
Payer: COMMERCIAL

## 2021-08-03 ENCOUNTER — HOSPITAL ENCOUNTER (EMERGENCY)
Facility: CLINIC | Age: 41
Discharge: HOME | End: 2021-08-03
Attending: EMERGENCY MEDICINE | Admitting: EMERGENCY MEDICINE
Payer: COMMERCIAL

## 2021-08-03 ENCOUNTER — APPOINTMENT (OUTPATIENT)
Dept: MRI IMAGING | Facility: CLINIC | Age: 41
End: 2021-08-03
Attending: EMERGENCY MEDICINE
Payer: COMMERCIAL

## 2021-08-03 ENCOUNTER — APPOINTMENT (OUTPATIENT)
Dept: ULTRASOUND IMAGING | Facility: CLINIC | Age: 41
End: 2021-08-03
Attending: EMERGENCY MEDICINE
Payer: COMMERCIAL

## 2021-08-03 VITALS
OXYGEN SATURATION: 93 % | SYSTOLIC BLOOD PRESSURE: 129 MMHG | RESPIRATION RATE: 17 BRPM | TEMPERATURE: 97.7 F | DIASTOLIC BLOOD PRESSURE: 66 MMHG | BODY MASS INDEX: 47.6 KG/M2 | WEIGHT: 293 LBS | HEART RATE: 99 BPM

## 2021-08-03 DIAGNOSIS — R74.8 ELEVATED LIVER ENZYMES: ICD-10-CM

## 2021-08-03 LAB
ALBUMIN SERPL-MCNC: 3.9 G/DL (ref 3.5–5)
ALP SERPL-CCNC: 117 U/L (ref 45–120)
ALT SERPL W P-5'-P-CCNC: 178 U/L (ref 0–45)
ANION GAP SERPL CALCULATED.3IONS-SCNC: 11 MMOL/L (ref 5–18)
AST SERPL W P-5'-P-CCNC: 98 U/L (ref 0–40)
BASOPHILS # BLD AUTO: 0 10E3/UL (ref 0–0.2)
BASOPHILS NFR BLD AUTO: 0 %
BILIRUB SERPL-MCNC: 0.3 MG/DL (ref 0–1)
BUN SERPL-MCNC: 12 MG/DL (ref 8–22)
CALCIUM SERPL-MCNC: 9.5 MG/DL (ref 8.5–10.5)
CHLORIDE BLD-SCNC: 102 MMOL/L (ref 98–107)
CO2 SERPL-SCNC: 26 MMOL/L (ref 22–31)
CREAT SERPL-MCNC: 0.67 MG/DL (ref 0.6–1.1)
D DIMER PPP FEU-MCNC: 0.39 UG/ML FEU (ref 0–0.5)
EOSINOPHIL # BLD AUTO: 0.7 10E3/UL (ref 0–0.7)
EOSINOPHIL NFR BLD AUTO: 7 %
ERYTHROCYTE [DISTWIDTH] IN BLOOD BY AUTOMATED COUNT: 12.2 % (ref 10–15)
GFR SERPL CREATININE-BSD FRML MDRD: >90 ML/MIN/1.73M2
GLUCOSE BLD-MCNC: 187 MG/DL (ref 70–125)
HCT VFR BLD AUTO: 42.5 % (ref 35–47)
HGB BLD-MCNC: 14.1 G/DL (ref 11.7–15.7)
HOLD SPECIMEN: NORMAL
IMM GRANULOCYTES # BLD: 0 10E3/UL
IMM GRANULOCYTES NFR BLD: 0 %
LIPASE SERPL-CCNC: 67 U/L (ref 0–52)
LYMPHOCYTES # BLD AUTO: 2.6 10E3/UL (ref 0.8–5.3)
LYMPHOCYTES NFR BLD AUTO: 25 %
MCH RBC QN AUTO: 28 PG (ref 26.5–33)
MCHC RBC AUTO-ENTMCNC: 33.2 G/DL (ref 31.5–36.5)
MCV RBC AUTO: 84 FL (ref 78–100)
MONOCYTES # BLD AUTO: 0.7 10E3/UL (ref 0–1.3)
MONOCYTES NFR BLD AUTO: 7 %
NEUTROPHILS # BLD AUTO: 6.4 10E3/UL (ref 1.6–8.3)
NEUTROPHILS NFR BLD AUTO: 61 %
NRBC # BLD AUTO: 0 10E3/UL
NRBC BLD AUTO-RTO: 0 /100
PLATELET # BLD AUTO: 343 10E3/UL (ref 150–450)
POTASSIUM BLD-SCNC: 3.9 MMOL/L (ref 3.5–5)
PROT SERPL-MCNC: 7.6 G/DL (ref 6–8)
RBC # BLD AUTO: 5.04 10E6/UL (ref 3.8–5.2)
SODIUM SERPL-SCNC: 139 MMOL/L (ref 136–145)
TROPONIN I SERPL-MCNC: <0.01 NG/ML (ref 0–0.29)
WBC # BLD AUTO: 10.4 10E3/UL (ref 4–11)

## 2021-08-03 PROCEDURE — 84132 ASSAY OF SERUM POTASSIUM: CPT | Performed by: EMERGENCY MEDICINE

## 2021-08-03 PROCEDURE — 255N000002 HC RX 255 OP 636: Performed by: EMERGENCY MEDICINE

## 2021-08-03 PROCEDURE — 83690 ASSAY OF LIPASE: CPT | Performed by: EMERGENCY MEDICINE

## 2021-08-03 PROCEDURE — 250N000011 HC RX IP 250 OP 636: Performed by: EMERGENCY MEDICINE

## 2021-08-03 PROCEDURE — 85379 FIBRIN DEGRADATION QUANT: CPT | Performed by: EMERGENCY MEDICINE

## 2021-08-03 PROCEDURE — 80053 COMPREHEN METABOLIC PANEL: CPT | Performed by: EMERGENCY MEDICINE

## 2021-08-03 PROCEDURE — 36415 COLL VENOUS BLD VENIPUNCTURE: CPT | Performed by: EMERGENCY MEDICINE

## 2021-08-03 PROCEDURE — 74183 MRI ABD W/O CNTR FLWD CNTR: CPT

## 2021-08-03 PROCEDURE — 76705 ECHO EXAM OF ABDOMEN: CPT

## 2021-08-03 PROCEDURE — 71046 X-RAY EXAM CHEST 2 VIEWS: CPT

## 2021-08-03 PROCEDURE — 250N000009 HC RX 250: Performed by: EMERGENCY MEDICINE

## 2021-08-03 PROCEDURE — 250N000013 HC RX MED GY IP 250 OP 250 PS 637: Performed by: EMERGENCY MEDICINE

## 2021-08-03 PROCEDURE — A9585 GADOBUTROL INJECTION: HCPCS | Performed by: EMERGENCY MEDICINE

## 2021-08-03 PROCEDURE — 84484 ASSAY OF TROPONIN QUANT: CPT | Performed by: EMERGENCY MEDICINE

## 2021-08-03 PROCEDURE — 85025 COMPLETE CBC W/AUTO DIFF WBC: CPT | Performed by: EMERGENCY MEDICINE

## 2021-08-03 RX ORDER — ONDANSETRON 2 MG/ML
4 INJECTION INTRAMUSCULAR; INTRAVENOUS EVERY 30 MIN PRN
Status: DISCONTINUED | OUTPATIENT
Start: 2021-08-03 | End: 2021-08-03 | Stop reason: HOSPADM

## 2021-08-03 RX ORDER — KETOROLAC TROMETHAMINE 15 MG/ML
10 INJECTION, SOLUTION INTRAMUSCULAR; INTRAVENOUS ONCE
Status: COMPLETED | OUTPATIENT
Start: 2021-08-03 | End: 2021-08-03

## 2021-08-03 RX ORDER — GADOBUTROL 604.72 MG/ML
10 INJECTION INTRAVENOUS ONCE
Status: COMPLETED | OUTPATIENT
Start: 2021-08-03 | End: 2021-08-03

## 2021-08-03 RX ORDER — ONDANSETRON 4 MG/1
4 TABLET, ORALLY DISINTEGRATING ORAL EVERY 8 HOURS PRN
Qty: 10 TABLET | Refills: 0 | Status: SHIPPED | OUTPATIENT
Start: 2021-08-03 | End: 2021-08-06

## 2021-08-03 RX ORDER — HYDROMORPHONE HYDROCHLORIDE 1 MG/ML
0.5 INJECTION, SOLUTION INTRAMUSCULAR; INTRAVENOUS; SUBCUTANEOUS
Status: COMPLETED | OUTPATIENT
Start: 2021-08-03 | End: 2021-08-03

## 2021-08-03 RX ORDER — PANTOPRAZOLE SODIUM 40 MG/1
40 TABLET, DELAYED RELEASE ORAL DAILY
Qty: 30 TABLET | Refills: 0 | Status: SHIPPED | OUTPATIENT
Start: 2021-08-03 | End: 2021-09-02

## 2021-08-03 RX ADMIN — GADOBUTROL 10 ML: 604.72 INJECTION INTRAVENOUS at 06:08

## 2021-08-03 RX ADMIN — HYDROMORPHONE HYDROCHLORIDE 0.5 MG: 1 INJECTION, SOLUTION INTRAMUSCULAR; INTRAVENOUS; SUBCUTANEOUS at 03:40

## 2021-08-03 RX ADMIN — ONDANSETRON 4 MG: 2 INJECTION INTRAMUSCULAR; INTRAVENOUS at 01:09

## 2021-08-03 RX ADMIN — KETOROLAC TROMETHAMINE 10 MG: 15 INJECTION, SOLUTION INTRAMUSCULAR; INTRAVENOUS at 02:07

## 2021-08-03 RX ADMIN — LIDOCAINE HYDROCHLORIDE 30 ML: 20 SOLUTION ORAL; TOPICAL at 01:08

## 2021-08-03 RX ADMIN — HYDROMORPHONE HYDROCHLORIDE 0.5 MG: 1 INJECTION, SOLUTION INTRAMUSCULAR; INTRAVENOUS; SUBCUTANEOUS at 02:57

## 2021-08-03 RX ADMIN — HYDROMORPHONE HYDROCHLORIDE 0.5 MG: 1 INJECTION, SOLUTION INTRAMUSCULAR; INTRAVENOUS; SUBCUTANEOUS at 04:56

## 2021-08-03 RX ADMIN — MIDAZOLAM HYDROCHLORIDE 2 MG: 1 INJECTION, SOLUTION INTRAMUSCULAR; INTRAVENOUS at 05:23

## 2021-08-03 ASSESSMENT — ENCOUNTER SYMPTOMS
NAUSEA: 1
SHORTNESS OF BREATH: 1
VOMITING: 0
FEVER: 0
COUGH: 0
CHILLS: 0

## 2021-08-03 NOTE — Clinical Note
Ambar Rodriguez was seen and treated in our emergency department on 8/2/2021.  She may return to work on 08/06/2021.       If you have any questions or concerns, please don't hesitate to call.      Ohl, Allen Bernal, DO

## 2021-08-03 NOTE — ED PROVIDER NOTES
Received patient on sign out.    HPI    Patient presents with worsening burning sternal chest pain that radiates to both shoulder and back since this morning. In addition, she endorses associated nausea, and shortness of breath.  Patient notes that she woke up with the pain but it was tolerable, so she went to work. At work her pain significantly worsened. She notes that the pain is worse than the pain she felt before she had her gallbladder removed. Patient does have a history of heart problems in her sister, but was unsure exactly what condition her sister had. She is COVID-19 vaccinated and has no known sick exposures. Patient is not a smoker.     Denies vomiting, leg pain, leg swelling, fever, chills, cough, or any additional symptoms.         Vitals:    08/03/21 0215 08/03/21 0300 08/03/21 0315 08/03/21 0445   BP: 136/72 (!) 157/80     Pulse: 92 102 91 94   Resp: 11 18 17    Temp:       TempSrc:       SpO2: 96% 95% 95% 92%   Weight:    145.2 kg (320 lb)              ED COURSE AND MEDICAL DECISION MAKING  6:25 AM Patient signed out to me by Dr. Andrea MD.    Briefly patient is a morbidly obese 40-year-old with a history of cholecystectomy 11 years ago here with upper abdominal substernal chest pain and burning sensation.  No lower abdominal or mid abdominal pain.    On signout plan was to follow-up MRCP as patient did have a mild elevation of her LFTs.  -MRCP showing postcholecystectomy dilatation.  No acute process.  -On repeat evaluation patient states she is feeling mildly nauseous however better.  We will send her home with Protonix and Zofran.  -Labs reviewed.  -We discussed patient's LFT elevation recommending outpatient GI follow-up and primary care follow-up.        FINAL DIAGNOSIS    Epigastric discomfort      IAi, am serving as a scribe to document services personally performed by Dr. Boris DO based on my observations and the provider's personal statements to me. I, Dr. Boris DO, attest that  Ai Borges is acting in a scribe capacity, has observed my performance of the service and has documented them in accordance with my directions.           Ohl, Allen Bernal, DO  08/03/21 0602

## 2021-08-03 NOTE — DISCHARGE INSTRUCTIONS
Your blood work today showed a mild elevation of your liver enzymes.  The imaging of your chest abdomen and gallbladder all were normal.  It looks like the initial findings on your ultrasound are from your gallbladder surgery.      Please follow-up with your primary care doctor as well as GI regarding your blood work.

## 2021-08-03 NOTE — ED TRIAGE NOTES
"Pt presents with L chest pain that radiates to mid chest and bilateral shoulders. Pt denies lightheadedness, headache or nausea. Pt does reports shortness of breath and feeling \"anxious\". ABCs intact.     "

## 2021-08-03 NOTE — ED PROVIDER NOTES
EMERGENCY DEPARTMENT ENCOUNTER      NAME: Ambar Rodriguez  AGE: 40 year old female  YOB: 1980  MRN: [id number]  EVALUATION DATE & TIME: 8/3/2021 12:38 AM    PCP: Maya Ovalle    ED PROVIDER: Gary Mendez M.D.      Chief Complaint   Patient presents with     Chest Pain         FINAL IMPRESSION:  1. Elevated liver enzymes          ED COURSE & MEDICAL DECISION MAKING:    Pertinent Labs & Imaging studies reviewed. (See chart for details)  40 year old female presents to the Emergency Department for evaluation of epigastric and chest pain.  Initial EKG is normal.  Did do a troponin this is negative.  Patient symptoms have been present for over 14 hours I do not think serial troponins are needed.  D-dimer is negative.  Patient is low risk.  I think further work-up for this.  Does have elevation of her LFTs this has been going on lately and actually is improved but given her symptoms did get an ultrasound.  Ultrasound shows a dilated common bile duct.  This is likely due to her gallbladder surgery but will get an MRCP to better delineate if there is something else obstructing.  Patient signed out to Dr. Escalante pending MRCP.  Plan at sign of signout is if MRCP negative will discharge and have her follow-up with her primary and GI.    12:57 PM I met with the patient to gather history and to perform my initial exam. I discussed the plan for care while in the Emergency Department. PPE: Surgical Mask    At the conclusion of the encounter I discussed the results of all of the tests and the disposition. The questions were answered. The patient or family acknowledged understanding and was agreeable with the care plan.           MEDICATIONS GIVEN IN THE EMERGENCY:  Medications   ondansetron (ZOFRAN) injection 4 mg (4 mg Intravenous Given 8/3/21 0109)   lidocaine (XYLOCAINE) 2 % 15 mL, alum & mag hydroxide-simethicone (MAALOX) 15 mL GI Cocktail (30 mLs Oral Given 8/3/21 0108)   ketorolac (TORADOL) injection 10 mg (10  mg Intravenous Given 8/3/21 0207)   HYDROmorphone (PF) (DILAUDID) injection 0.5 mg (0.5 mg Intravenous Given 8/3/21 9526)   midazolam (VERSED) injection 2 mg (2 mg Intravenous Given 8/3/21 6166)   gadobutrol (GADAVIST) injection 10 mL (10 mLs Intravenous Given 8/3/21 0664)       NEW PRESCRIPTIONS STARTED AT TODAY'S ER VISIT  New Prescriptions    No medications on file          =================================================================    HPI    Patient information was obtained from: Patient    Use of : N/A       Ambar Rodriguez is a 40 year old female with a pertinent history of s/p cholecystectomy, who presents to this ED via private car for evaluation of worsening burning sternal chest pain that radiates to both shoulder and back since this morning. In addition, she endorses associated nausea, and shortness of breath.  Patient notes that she woke up with the pain but it was tolerable, so she went to work. At work her pain significantly worsened. She notes that the pain is worse than the pain she felt before she had her gallbladder removed. Patient does have a history of heart problems in her sister, but was unsure exactly what condition her sister had. She is COVID-19 vaccinated and has no known sick exposures. Patient is not a smoker.    Denies vomiting, leg pain, leg swelling, fever, chills, cough, or any additional symptoms.    REVIEW OF SYSTEMS   Review of Systems   Constitutional: Negative for chills and fever.   Respiratory: Positive for shortness of breath. Negative for cough.    Cardiovascular: Positive for chest pain. Negative for leg swelling.   Gastrointestinal: Positive for nausea. Negative for vomiting.   Musculoskeletal:        Negative for leg pain.   All other systems reviewed and are negative.      PAST MEDICAL HISTORY:  History reviewed. No pertinent past medical history.    PAST SURGICAL HISTORY:  Past Surgical History:   Procedure Laterality Date     CHOLECYSTECTOMY       FOOT  "FRACTURE SURGERY Left     x3     FOOT FRACTURE SURGERY Left 2011           CURRENT MEDICATIONS:    Current Facility-Administered Medications   Medication     ondansetron (ZOFRAN) injection 4 mg     Current Outpatient Medications   Medication     albuterol (PROAIR HFA;PROVENTIL HFA;VENTOLIN HFA) 90 mcg/actuation inhaler     blood glucose test strips     cetirizine HCl (ZYRTEC ORAL)     cholecalciferol, vitamin D3, (VITAMIN D3 ORAL)     escitalopram oxalate (LEXAPRO) 20 MG tablet     escitalopram oxalate (LEXAPRO) 20 MG tablet     gabapentin (NEURONTIN) 100 MG capsule     insulin detemir U-100 (LEVEMIR FLEXTOUCH U-100 INSULN) 100 unit/mL (3 mL) pen     lancets (ONETOUCH DELICA LANCETS) 33 gauge Misc     meclizine (ANTIVERT) 25 mg tablet     metFORMIN (GLUCOPHAGE-XR) 500 MG 24 hr tablet     pen needle, diabetic (BD ULTRA-FINE DONNELL PEN NEEDLE) 32 gauge x 5/32\" Ndle     pen needle, diabetic (BD ULTRA-FINE DONNELL PEN NEEDLE) 32 gauge x 5/32\" Ndle     phentermine (ADIPEX-P) 37.5 mg tablet     prenatal 25/iron fum/folic/dha (PRENATAL-1 ORAL)     semaglutide (OZEMPIC) 0.25 mg or 0.5 mg(2 mg/1.5 mL) PnIj     traMADoL (ULTRAM) 50 mg tablet         ALLERGIES:  Allergies   Allergen Reactions     Alprazolam Other (See Comments)     Irritable, no memory of what happened     Clonazepam Dizziness     No memory of what happened while on medication       FAMILY HISTORY:  Family History   Problem Relation Age of Onset     Diabetes Type 2  Mother      Depression Mother      Arthritis Mother      Cerebrovascular Disease Mother 60.00     Diabetes Type 2  Father      Depression Father      Early Death Sister         rare blood disorder     Lung Cancer Maternal Grandmother      Cerebrovascular Disease Maternal Grandmother      Throat cancer Maternal Grandfather      Diabetes Maternal Grandfather      Prostate Cancer Paternal Grandfather      Depression Sister      Asthma Sister      Liver Disease Sister         Primary biliary cholangitis  "       SOCIAL HISTORY:   Social History     Socioeconomic History     Marital status: Single     Spouse name: None     Number of children: None     Years of education: None     Highest education level: None   Occupational History     None   Tobacco Use     Smoking status: Never Smoker     Smokeless tobacco: Never Used   Substance and Sexual Activity     Alcohol use: None     Drug use: None     Sexual activity: None   Other Topics Concern     None   Social History Narrative     None     Social Determinants of Health     Financial Resource Strain:      Difficulty of Paying Living Expenses:    Food Insecurity:      Worried About Running Out of Food in the Last Year:      Ran Out of Food in the Last Year:    Transportation Needs:      Lack of Transportation (Medical):      Lack of Transportation (Non-Medical):    Physical Activity:      Days of Exercise per Week:      Minutes of Exercise per Session:    Stress:      Feeling of Stress :    Social Connections:      Frequency of Communication with Friends and Family:      Frequency of Social Gatherings with Friends and Family:      Attends Confucianism Services:      Active Member of Clubs or Organizations:      Attends Club or Organization Meetings:      Marital Status:    Intimate Partner Violence:      Fear of Current or Ex-Partner:      Emotionally Abused:      Physically Abused:      Sexually Abused:        VITALS:  BP (!) 157/80   Pulse 94   Temp 97.7  F (36.5  C) (Temporal)   Resp 17   Wt 145.2 kg (320 lb)   LMP  (Within Weeks)   SpO2 92%   BMI 47.60 kg/m      PHYSICAL EXAM    Physical Exam  Constitutional:       General: She is not in acute distress.     Appearance: She is not diaphoretic.   HENT:      Head: Atraumatic.      Mouth/Throat:      Pharynx: No oropharyngeal exudate.   Eyes:      General: No scleral icterus.     Pupils: Pupils are equal, round, and reactive to light.   Cardiovascular:      Heart sounds: Normal heart sounds.   Pulmonary:      Effort:  No respiratory distress.      Breath sounds: Normal breath sounds.   Abdominal:      Palpations: Abdomen is soft.      Tenderness: There is abdominal tenderness ( ruq). There is no guarding or rebound.   Musculoskeletal:         General: No tenderness.   Skin:     General: Skin is warm.      Findings: No rash.   Neurological:      General: No focal deficit present.      Mental Status: She is alert.      Cranial Nerves: No cranial nerve deficit.      Motor: No weakness.           LAB:  All pertinent labs reviewed and interpreted.  Labs Ordered and Resulted from Time of ED Arrival Up to the Time of Departure from the ED   COMPREHENSIVE METABOLIC PANEL - Abnormal; Notable for the following components:       Result Value    Glucose 187 (*)     AST 98 (*)      (*)     All other components within normal limits   LIPASE - Abnormal; Notable for the following components:    Lipase 67 (*)     All other components within normal limits   TROPONIN I - Normal   D DIMER QUANTITATIVE - Normal    Narrative:     This D-dimer assay is intended for use in conjunction with a clinical pretest probability assessment model to exclude pulmonary embolism (PE) and deep venous thrombosis (DVT) in outpatients suspected of PE or DVT. The cut-off value is 0.50 ug/mL FEU.   EXTRA BLUE TOP TUBE   EXTRA RED TOP TUBE   EXTRA GREEN TOP (LITHIUM HEPARIN) TUBE   EXTRA PURPLE TOP TUBE   CBC WITH PLATELETS AND DIFFERENTIAL   PERIPHERAL IV CATHETER   EXTRA TUBE    Narrative:     The following orders were created for panel order [facility] Draw.  Procedure                               Abnormality         Status                     ---------                               -----------         ------                     Extra Blue Top Tube[[id number]]                              Final result               Extra Red Top Tube[[id number]]                               Final result               Extra Green Top (Lithium...[[id number]]                      Final  result               Extra Purple Top Tube[[id number]]                            Final result                 Please view results for these tests on the individual orders.   CBC WITH PLATELETS & DIFFERENTIAL    Narrative:     The following orders were created for panel order CBC with platelets differential.  Procedure                               Abnormality         Status                     ---------                               -----------         ------                     CBC with platelets and d...[[id number]]                      Final result                 Please view results for these tests on the individual orders.       RADIOLOGY:  Reviewed all pertinent imaging. Please see official radiology report.  MR Abdomen MRCP w/o & w Contrast   Final Result   IMPRESSION:   1.  Mild extra hepatic biliary dilatation consistent with reservoir effect after cholecystectomy. No choledocholithiasis or obstructing mass visualized.   2.  Hepatic steatosis.      US Abdomen Limited (RUQ)   Final Result   IMPRESSION:   1.  Gallbladder surgically absent.      2.  Mild extra hepatic biliary prominence at 7 mm. This is within normal limits for postcholecystectomy reservoir state. However, the patient has abnormal biliary markers and persistent right upper quadrant pain consider further follow-up evaluation with    MRCP.      3.   Diffuse fatty infiltration of the liver.            Chest XR,  PA & LAT   Final Result   IMPRESSION: No evidence of active cardiopulmonary disease.           EKG:    Performed at: 2334  Impression: Sinus rhythm.  No acute ischemic changes.  Compared to previous dated October 18, 2012 no longer have flipped T waves in the inferior leads with otherwise unchanged  Sinus rhythm ventricular to 78.  .  QRS 78.  QTc 426    I have independently reviewed and interpreted the EKG(s) documented above.      I, Cory Freitas, am serving as a scribe to document services personally performed by Dr. Vega  Andrea, based on my observation and the provider's statements to me. I, Gary Mendez MD attest that Cory Freitas is acting in a scribe capacity, has observed my performance of the services and has documented them in accordance with my direction.    Gary Mendez M.D.  Emergency Medicine  [facility] EMERGENCY ROOM  [address] [address]  [phone]  Dept: [phone]     Gary Mendez MD  08/03/21 0652

## 2021-08-04 LAB
ATRIAL RATE - MUSE: 78 BPM
DIASTOLIC BLOOD PRESSURE - MUSE: NORMAL MMHG
INTERPRETATION ECG - MUSE: NORMAL
P AXIS - MUSE: 43 DEGREES
PR INTERVAL - MUSE: 154 MS
QRS DURATION - MUSE: 78 MS
QT - MUSE: 374 MS
QTC - MUSE: 426 MS
R AXIS - MUSE: 24 DEGREES
SYSTOLIC BLOOD PRESSURE - MUSE: NORMAL MMHG
T AXIS - MUSE: 16 DEGREES
VENTRICULAR RATE- MUSE: 78 BPM

## 2021-09-06 DIAGNOSIS — E11.9 TYPE 2 DIABETES MELLITUS WITHOUT COMPLICATION, WITHOUT LONG-TERM CURRENT USE OF INSULIN (H): ICD-10-CM

## 2021-09-07 RX ORDER — INSULIN DETEMIR 100 [IU]/ML
INJECTION, SOLUTION SUBCUTANEOUS
Qty: 15 ML | Refills: 1 | Status: SHIPPED | OUTPATIENT
Start: 2021-09-07 | End: 2022-01-13

## 2021-09-07 NOTE — TELEPHONE ENCOUNTER
"Last Written Prescription Date:  4/2/21  Last Fill Quantity: 14 ml,  # refills: 1   Last office visit provider:  6/17/21     Requested Prescriptions   Pending Prescriptions Disp Refills     LEVEMIR FLEXTOUCH 100 UNIT/ML pen [Pharmacy Med Name: LEVEMIR FLEXTOUCH PEN 3ML 5'S 100U/ML] 15 mL 3     Sig: INJECT 15 UNITS UNDER THE SKIN AT BEDTIME       Long Acting Insulin Protocol Failed - 9/6/2021  7:54 PM        Failed - Recent (6 mo) or future (30 days) visit within the authorizing provider's specialty     Patient had office visit in the last 6 months or has a visit in the next 30 days with authorizing provider or within the authorizing provider's specialty.  See \"Patient Info\" tab in inbasket, or \"Choose Columns\" in Meds & Orders section of the refill encounter.            Passed - Serum creatinine on file in past 12 months     Recent Labs   Lab Test 08/03/21  0050   CR 0.67       Ok to refill medication if creatinine is low          Passed - HgbA1C in past 3 or 6 months     If HgbA1C is 8 or greater, it needs to be on file within the past 3 months.  If less than 8, must be on file within the past 6 months.     Recent Labs   Lab Test 06/17/21  0816   A1C 8.9*             Passed - Medication is active on med list        Passed - Patient is age 18 or older             Paulie Hernández RN 09/07/21 7:37 AM  "

## 2021-10-16 ENCOUNTER — HEALTH MAINTENANCE LETTER (OUTPATIENT)
Age: 41
End: 2021-10-16

## 2021-12-20 DIAGNOSIS — E11.9 TYPE 2 DIABETES MELLITUS WITHOUT COMPLICATION, WITHOUT LONG-TERM CURRENT USE OF INSULIN (H): ICD-10-CM

## 2021-12-22 RX ORDER — METFORMIN HCL 500 MG
TABLET, EXTENDED RELEASE 24 HR ORAL
Qty: 360 TABLET | Refills: 0 | Status: SHIPPED | OUTPATIENT
Start: 2021-12-22 | End: 2022-01-13

## 2021-12-22 NOTE — TELEPHONE ENCOUNTER
"Routing refill request to provider for review/approval because:  [facility] unable to fill per protocol.     Last Written Prescription Date:  4/12/2021  Last Fill Quantity: 360,  # refills: 2   Last office visit provider:  6/17/2021     Requested Prescriptions   Pending Prescriptions Disp Refills     metFORMIN (GLUCOPHAGE-XR) 500 MG 24 hr tablet [Pharmacy Med Name: METFORMIN HCL ER TABS 500MG] 360 tablet 3     Sig: TAKE AS INSTRUCTED BY YOUR PRESCRIBER       Biguanide Agents Passed - 12/20/2021 12:24 AM        Passed - Patient is age 10 or older        Passed - Recent (12 mo) or future (30 days) visit within the authorizing provider's specialty      Patient has had an office visit with the authorizing provider or a provider within the authorizing providers department within the previous 12 mos or has a future within next 30 days. See \"Patient Info\" tab in inbasket, or \"Choose Columns\" in Meds & Orders section of the refill encounter.              Passed - Patient does NOT have a diagnosis of CHF.        Passed - Medication is active on med list        Passed - Patient is not pregnant        Passed - Patient has not had a positive pregnancy test within the past 12 mos.              Michelle Lan RN 12/21/21 10:10 PM  "

## 2022-01-09 DIAGNOSIS — E11.9 TYPE 2 DIABETES MELLITUS WITHOUT COMPLICATION, WITHOUT LONG-TERM CURRENT USE OF INSULIN (H): Primary | ICD-10-CM

## 2022-01-10 ENCOUNTER — OFFICE VISIT (OUTPATIENT)
Dept: FAMILY MEDICINE | Facility: CLINIC | Age: 42
End: 2022-01-10
Payer: COMMERCIAL

## 2022-01-10 VITALS
HEART RATE: 93 BPM | OXYGEN SATURATION: 98 % | BODY MASS INDEX: 48.71 KG/M2 | SYSTOLIC BLOOD PRESSURE: 124 MMHG | WEIGHT: 293 LBS | DIASTOLIC BLOOD PRESSURE: 86 MMHG

## 2022-01-10 DIAGNOSIS — R41.840 INATTENTION: ICD-10-CM

## 2022-01-10 DIAGNOSIS — F41.1 GAD (GENERALIZED ANXIETY DISORDER): ICD-10-CM

## 2022-01-10 DIAGNOSIS — G43.009 MIGRAINE WITHOUT AURA AND WITHOUT STATUS MIGRAINOSUS, NOT INTRACTABLE: ICD-10-CM

## 2022-01-10 DIAGNOSIS — F33.0 MILD RECURRENT MAJOR DEPRESSION (H): ICD-10-CM

## 2022-01-10 DIAGNOSIS — E66.01 MORBID OBESITY (H): ICD-10-CM

## 2022-01-10 DIAGNOSIS — R74.8 ELEVATED LIVER ENZYMES: ICD-10-CM

## 2022-01-10 DIAGNOSIS — E11.9 TYPE 2 DIABETES MELLITUS WITHOUT COMPLICATION, WITHOUT LONG-TERM CURRENT USE OF INSULIN (H): Primary | ICD-10-CM

## 2022-01-10 LAB
ALBUMIN SERPL-MCNC: 3.8 G/DL (ref 3.5–5)
ALP SERPL-CCNC: 147 U/L (ref 45–120)
ALT SERPL W P-5'-P-CCNC: 428 U/L (ref 0–45)
ANION GAP SERPL CALCULATED.3IONS-SCNC: 9 MMOL/L (ref 5–18)
AST SERPL W P-5'-P-CCNC: 258 U/L (ref 0–40)
BILIRUB SERPL-MCNC: 0.4 MG/DL (ref 0–1)
BUN SERPL-MCNC: 10 MG/DL (ref 8–22)
CALCIUM SERPL-MCNC: 9.5 MG/DL (ref 8.5–10.5)
CHLORIDE BLD-SCNC: 102 MMOL/L (ref 98–107)
CO2 SERPL-SCNC: 26 MMOL/L (ref 22–31)
CREAT SERPL-MCNC: 0.74 MG/DL (ref 0.6–1.1)
CREAT UR-MCNC: 236 MG/DL
GFR SERPL CREATININE-BSD FRML MDRD: >90 ML/MIN/1.73M2
GLUCOSE BLD-MCNC: 284 MG/DL (ref 70–125)
HBA1C MFR BLD: 10.6 % (ref 0–5.6)
MICROALBUMIN UR-MCNC: 4.06 MG/DL (ref 0–1.99)
MICROALBUMIN/CREAT UR: 17.2 MG/G CR
POTASSIUM BLD-SCNC: 5.3 MMOL/L (ref 3.5–5)
PROT SERPL-MCNC: 7.2 G/DL (ref 6–8)
SODIUM SERPL-SCNC: 137 MMOL/L (ref 136–145)

## 2022-01-10 PROCEDURE — 86039 ANTINUCLEAR ANTIBODIES (ANA): CPT | Performed by: NURSE PRACTITIONER

## 2022-01-10 PROCEDURE — 83036 HEMOGLOBIN GLYCOSYLATED A1C: CPT | Performed by: NURSE PRACTITIONER

## 2022-01-10 PROCEDURE — 86709 HEPATITIS A IGM ANTIBODY: CPT | Performed by: NURSE PRACTITIONER

## 2022-01-10 PROCEDURE — 36415 COLL VENOUS BLD VENIPUNCTURE: CPT | Performed by: NURSE PRACTITIONER

## 2022-01-10 PROCEDURE — 90686 IIV4 VACC NO PRSV 0.5 ML IM: CPT | Performed by: NURSE PRACTITIONER

## 2022-01-10 PROCEDURE — 99214 OFFICE O/P EST MOD 30 MIN: CPT | Mod: 25 | Performed by: NURSE PRACTITIONER

## 2022-01-10 PROCEDURE — 86038 ANTINUCLEAR ANTIBODIES: CPT | Performed by: NURSE PRACTITIONER

## 2022-01-10 PROCEDURE — 86706 HEP B SURFACE ANTIBODY: CPT | Performed by: NURSE PRACTITIONER

## 2022-01-10 PROCEDURE — 87340 HEPATITIS B SURFACE AG IA: CPT | Performed by: NURSE PRACTITIONER

## 2022-01-10 PROCEDURE — 86803 HEPATITIS C AB TEST: CPT | Performed by: NURSE PRACTITIONER

## 2022-01-10 PROCEDURE — 86704 HEP B CORE ANTIBODY TOTAL: CPT | Performed by: NURSE PRACTITIONER

## 2022-01-10 PROCEDURE — 90471 IMMUNIZATION ADMIN: CPT | Performed by: NURSE PRACTITIONER

## 2022-01-10 PROCEDURE — 80053 COMPREHEN METABOLIC PANEL: CPT | Performed by: NURSE PRACTITIONER

## 2022-01-10 PROCEDURE — 82043 UR ALBUMIN QUANTITATIVE: CPT | Performed by: NURSE PRACTITIONER

## 2022-01-10 PROCEDURE — 83690 ASSAY OF LIPASE: CPT | Performed by: NURSE PRACTITIONER

## 2022-01-10 RX ORDER — ESCITALOPRAM OXALATE 20 MG/1
30 TABLET ORAL DAILY
Qty: 135 TABLET | Refills: 1 | Status: SHIPPED | OUTPATIENT
Start: 2022-01-10 | End: 2022-01-13

## 2022-01-10 RX ORDER — SEMAGLUTIDE 1.34 MG/ML
1 INJECTION, SOLUTION SUBCUTANEOUS
Qty: 1 ML | Refills: 0 | Status: SHIPPED | OUTPATIENT
Start: 2022-01-10 | End: 2022-01-24

## 2022-01-10 RX ORDER — MUPIROCIN 20 MG/G
OINTMENT TOPICAL
COMMUNITY
Start: 2021-02-17 | End: 2022-03-16

## 2022-01-10 RX ORDER — ESCITALOPRAM OXALATE 10 MG/1
TABLET ORAL
Qty: 90 TABLET | Refills: 2 | Status: SHIPPED | OUTPATIENT
Start: 2022-01-10 | End: 2022-09-30

## 2022-01-10 RX ORDER — TRAMADOL HYDROCHLORIDE 50 MG/1
50 TABLET ORAL EVERY 6 HOURS PRN
Qty: 10 TABLET | Refills: 0 | Status: SHIPPED | OUTPATIENT
Start: 2022-01-10 | End: 2022-03-16

## 2022-01-10 ASSESSMENT — ANXIETY QUESTIONNAIRES
3. WORRYING TOO MUCH ABOUT DIFFERENT THINGS: NOT AT ALL
6. BECOMING EASILY ANNOYED OR IRRITABLE: SEVERAL DAYS
5. BEING SO RESTLESS THAT IT IS HARD TO SIT STILL: NOT AT ALL
IF YOU CHECKED OFF ANY PROBLEMS ON THIS QUESTIONNAIRE, HOW DIFFICULT HAVE THESE PROBLEMS MADE IT FOR YOU TO DO YOUR WORK, TAKE CARE OF THINGS AT HOME, OR GET ALONG WITH OTHER PEOPLE: SOMEWHAT DIFFICULT
7. FEELING AFRAID AS IF SOMETHING AWFUL MIGHT HAPPEN: NOT AT ALL
2. NOT BEING ABLE TO STOP OR CONTROL WORRYING: NOT AT ALL
1. FEELING NERVOUS, ANXIOUS, OR ON EDGE: NOT AT ALL
GAD7 TOTAL SCORE: 2

## 2022-01-10 ASSESSMENT — PATIENT HEALTH QUESTIONNAIRE - PHQ9
SUM OF ALL RESPONSES TO PHQ QUESTIONS 1-9: 10
5. POOR APPETITE OR OVEREATING: SEVERAL DAYS

## 2022-01-10 NOTE — PROGRESS NOTES
Assessment & Plan   1. Type 2 diabetes mellitus without complication, without long-term current use of insulin (H)  Uncontrolled. A1C 10.6.  Has had poor follow up. Will refer to MTM for ongoing management.  Increase Ozempic to 1mg.  If she is checking blood sugars, may increase Levemir by two units every 2-3 days until AM sugars <130.  If not, advised not to titrate. She is interested in CGM and hopefully she will have coverage for this. Reminded of need for eye exam. Holding off on trying to conceive until diabetes better controlled.   - semaglutide (OZEMPIC, 0.25 OR 0.5 MG/DOSE,) 2 MG/1.5ML SOPN pen; Inject 1 mg Subcutaneous every 7 days  Dispense: 1 mL; Refill: 0  - Hemoglobin A1c  - Comprehensive metabolic panel (BMP + Alb, Alk Phos, ALT, AST, Total. Bili, TP)  - Albumin Random Urine Quantitative with Creat Ratio      2. Migraine without aura and without status migrainosus, not intractable  Continues to use Tramadol approximately once a month for migraines.  Triptans have not been effective.  NSAIDs effective though still leaves lingering HA. #10 has typically lasted at least 6 months.    - traMADol (ULTRAM) 50 MG tablet; Take 1 tablet (50 mg) by mouth every 6 hours as needed (migraine)  Dispense: 10 tablet; Refill: 0    3. Mild recurrent major depression (H)  Feels depression and anxiety well controlled with Lexapro  - escitalopram (LEXAPRO) 10 MG tablet; TAKE ONE TABLET BY MOUTH DAILY ALONG WITH 20MG TABLET  Dispense: 90 tablet; Refill: 2  - escitalopram (LEXAPRO) 20 MG tablet; Take 1.5 tablets (30 mg) by mouth daily  Dispense: 135 tablet; Refill: 1    4. Morbid obesity (H)  Feeling motivated to focus on diet. Have discussed referral for binge eating in the past. Phentermine was not particularly helpful for her.  Will continue to increase Ozempic    5. AMANDA (generalized anxiety disorder)  - escitalopram (LEXAPRO) 10 MG tablet; TAKE ONE TABLET BY MOUTH DAILY ALONG WITH 20MG TABLET  Dispense: 90 tablet; Refill:  2  - escitalopram (LEXAPRO) 20 MG tablet; Take 1.5 tablets (30 mg) by mouth daily  Dispense: 135 tablet; Refill: 1    6. Inattention  Referral for evaluation  - Adult Mental Health Referral; Future    7. Elevated liver enzymes  - INR  - Hepatitis A antibody IgM  - Hepatitis B surface antigen  - Hepatitis B Surface Antibody  - Hepatitis B core antibody  - Hepatitis C antibody  - Lipase  - HCG quantitative pregnancy  - Anti Nuclear Freya IgG by IFA with Reflex    Maya Ovalle, CNP    Subjective     HPI:   Ambar Rodriguez is a 41 year old female who presents for med check.      A1C today 10.6.  She states she 'has been bad' the last several months with diet.  She continues to take Metformin. Started on Ozempic six months ago, no follow up since that time.  She feels she is tolerating it well.  Now on 0.5mg dose.  20 units Levemir at bedtime.  States AM numbers have been around 190.  Checks very infrequently.  Interested in CGM.  Due for eye exam.      Anxiety:  Feels anxiety has been well controlled since her wedding is now behind her. Continues on Lexapro 30mg and does think the dose increase has been helpful.  No significant depression symptoms.      Inattention:  Interested in evaluation for ADHD      Allergies:  is allergic to alprazolam and clonazepam.    SH/FH:  Social History and Family History reviewed and updated.   Tobacco Status:  She  reports that she has never smoked. She has never used smokeless tobacco.    Review of Systems:  A complete head to toe ROS is negative unless otherwise noted in HPI    Objective     Vitals:    01/10/22 1109   BP: (!) 128/96   Pulse: 93   SpO2: 98%   Weight: 148.5 kg (327 lb 7 oz)       Physical Exam:  GENERAL: Alert, well-appearing  .   PSYCH: Pleasant mood, affect appropriate.  Good judgment and insight.  Intact recent and remote memory.  Good eye contact.  SKIN: No lesions  CV: Regular rate and rhythm without murmurs, rubs or gallops.  RESP: Lung sounds clear  PV : Mild  edema.  Pedal pulses 2+  NEURO: Sensation intact to monofilament testing at all sites

## 2022-01-11 DIAGNOSIS — R74.8 ELEVATED LIVER ENZYMES: Primary | ICD-10-CM

## 2022-01-11 LAB
HBV SURFACE AB SERPLBLD QL IA.RAPID: NEGATIVE
HBV SURFACE AG SERPL QL IA: NONREACTIVE
HCV AB SERPL QL IA: NEGATIVE
LIPASE SERPL-CCNC: 137 U/L (ref 0–52)

## 2022-01-11 ASSESSMENT — ANXIETY QUESTIONNAIRES: GAD7 TOTAL SCORE: 2

## 2022-01-11 ASSESSMENT — ASTHMA QUESTIONNAIRES: ACT_TOTALSCORE: 24

## 2022-01-12 LAB
HAV IGM SERPL QL IA: NEGATIVE
HBV CORE AB SERPL QL IA: NEGATIVE

## 2022-01-13 ENCOUNTER — VIRTUAL VISIT (OUTPATIENT)
Dept: PHARMACY | Facility: CLINIC | Age: 42
End: 2022-01-13
Attending: NURSE PRACTITIONER
Payer: COMMERCIAL

## 2022-01-13 DIAGNOSIS — G43.009 MIGRAINE WITHOUT AURA AND WITHOUT STATUS MIGRAINOSUS, NOT INTRACTABLE: ICD-10-CM

## 2022-01-13 DIAGNOSIS — J45.20 MILD INTERMITTENT ASTHMA WITHOUT COMPLICATION: ICD-10-CM

## 2022-01-13 DIAGNOSIS — E11.9 TYPE 2 DIABETES MELLITUS WITHOUT COMPLICATION, WITHOUT LONG-TERM CURRENT USE OF INSULIN (H): Primary | ICD-10-CM

## 2022-01-13 DIAGNOSIS — F33.0 MILD RECURRENT MAJOR DEPRESSION (H): ICD-10-CM

## 2022-01-13 LAB
ANA PAT SER IF-IMP: ABNORMAL
ANA SER QL IF: ABNORMAL
ANA TITR SER IF: ABNORMAL {TITER}

## 2022-01-13 PROCEDURE — 99607 MTMS BY PHARM ADDL 15 MIN: CPT | Performed by: PHARMACIST

## 2022-01-13 PROCEDURE — 99605 MTMS BY PHARM NP 15 MIN: CPT | Performed by: PHARMACIST

## 2022-01-13 RX ORDER — ESCITALOPRAM OXALATE 20 MG/1
20 TABLET ORAL DAILY
Qty: 90 TABLET | Refills: 1 | Status: SHIPPED | OUTPATIENT
Start: 2022-01-13 | End: 2022-06-04

## 2022-01-13 RX ORDER — METFORMIN HCL 500 MG
2000 TABLET, EXTENDED RELEASE 24 HR ORAL AT BEDTIME
Qty: 360 TABLET | Refills: 1 | COMMUNITY
Start: 2022-01-13 | End: 2022-03-23

## 2022-01-13 RX ORDER — INSULIN DETEMIR 100 [IU]/ML
30 INJECTION, SOLUTION SUBCUTANEOUS AT BEDTIME
Qty: 30 ML | Refills: 1 | COMMUNITY
Start: 2022-01-13 | End: 2022-02-14

## 2022-01-13 RX ORDER — IBUPROFEN 200 MG
600-800 TABLET ORAL DAILY PRN
COMMUNITY
End: 2024-05-21

## 2022-01-13 NOTE — PROGRESS NOTES
Medication Therapy Management (MTM) Encounter    ASSESSMENT:                            Medication Adherence/Access: No issues identified    1. Type 2 diabetes mellitus without complication, without long-term current use of insulin (H)  Not at goal A1c less than 7% per ADA guidelines, likely due to dietary discretion.  She would greatly benefit from continuous glucose monitor to better understand how foods are impacting her blood sugars.  She has a smart phone before she can utilize the ciera to read the freestyle asad 2, I will send sensors to her pharmacy.  If not covered she may be able to purchase these outside of insurance.  We will try to connect remotely so that I can monitor her blood sugars.  With her ongoing elevated fasting blood sugars recommend to slightly increase insulin to 30 units once daily.  Reviewed that our ultimate goal is to taper this back down to help further with weight loss.  Recommend continue max dose metformin.  She will increase to Ozempic 1 mg weekly next week.  With further increasing Ozempic, ideally this will help with dietary changes, improving blood sugars, and ability to taper down on the basal insulin.  Not on aspirin, nor statin due to age, and no ACE inhibitor/ARB due to adequately controlled blood pressure and lack of albuminuria.    2. Mild recurrent major depression (H)  Stable, recommend to continue current regimen.    3. Migraine without aura and without status migrainosus, not intractable  Migraines are stable, however identified that she is likely having daily medication overuse headaches.  Reviewed chronic use of Excedrin Migraine and/or ibuprofen.  After further discussion she is willing to try to stop these medications cold turkey for at least 1 to 2 weeks and reevaluate if daily headaches resolved.  She will be following up with [facility] on January 25 for further evaluation of her elevated LFTs.    4. Mild intermittent asthma without complication  Stable,  recommend to continue current regimen.      PLAN:                            1. Initiate freestyle jasbir 2  2. Increase Levemir to 30 units once daily  3. Hold ibuprofen and Excedrin Migraine    Freestyle Jasbir education:   https://www.freestyle.abbott/us-en/margy.html (free trial)     Follow-up: Return in about 2 weeks (around 1/27/2022) for Follow up, with me, using a MyChart eVisit.    SUBJECTIVE/OBJECTIVE:                          Ambar Rodriguez is a 41 year old female called for an initial visit. She was referred to me from Maya Ovalle DNP.    Reason for visit: Medication Management Initial .    Allergies/ADRs: Reviewed in chart  Past Medical History: Reviewed in chart  Tobacco: She reports that she has never smoked. She has never used smokeless tobacco.  Alcohol: She may drink twice monthly.    Medication Adherence/Access: no issues reported    Type 2 Diabetes: Notes that she was  in September, and this was a very stressful process to plan the wedding and felt that she had done more stress eating.  She is also not followed a diabetes diet around the holidays.  She believes these have contributed to her increase in A1c.  She continues on metformin 2000 mg daily, denies signs or symptoms of adverse effects.  She continues on Ozempic 0.5 mg weekly, based on her current supply she has not yet increased to 1 mg weekly dose.  She plans to do so next week.  She did notice that she feels more full on Ozempic, a little nauseous when she first started this but not significantly.  Now after the holidays her diet has gone back to normal.  She does notice with elevated blood sugars that she experiences more fatigue, thirst, urinary frequency.  She does feel that these are improving some as her blood sugars improve.  She is also taking basal insulin, now up to 25 units of insulin daily at bedtime.  Denies signs or symptoms of hypoglycemia.  She is checking her blood sugars generally around 3 times daily.  Her  blood sugars postprandially due to decreased, and are generally in the 120-130 range.  This morning her fasting blood sugar was the lowest she has seen recently of 170.  She is does tend to have a late dinner based on when she gets off of work.  She has had a continuous glucose monitors and is interested in getting one.  Hemoglobin A1C   Date Value Ref Range Status   01/10/2022 10.6 (H) 0.0 - 5.6 % Final   06/17/2021 8.9 (H) <=5.6 % Final   01/21/2021 8.6 (H) <=5.6 % Final      Microalbumin Urine mg/dL   Date Value Ref Range Status   01/10/2022 4.06 (H) 0.00 - 1.99 mg/dL Final      Creatinine Urine mg/dL   Date Value Ref Range Status   01/10/2022 236 mg/dL Final     LDL Cholesterol Calculated   Date Value Ref Range Status   01/20/2020 108 <=129 mg/dL Final     Creatinine   Date Value Ref Range Status   01/10/2022 0.74 0.60 - 1.10 mg/dL Final     Elevated LFT's: Denies abdominal pain.  As discussed above she only has a few drinks about twice per month.  She does however use Excedrin Migraine every single day, generally 2 tablets as discussed further below.  She already has an appointment with gastroenterology after she was referred by PCP, on January 25, 2022.    Depression: Continues on escitalopram 30 mg daily, finds this to be very effective for her, denies adverse effects.  PHQ-9 score:    PHQ 1/10/2022   PHQ-9 Total Score 10   Q9: Thoughts of better off dead/self-harm past 2 weeks Not at all     Migraine without aura: She has more infrequent migraines, currently utilizing tramadol less than once per month and finds this to be effective.  This was an alternative to triptans due to use of SSRI.  While her migraines are infrequent she does have daily headaches.  Believes this has been going on for about 1-1/2 years.  Identified that she is using Excedrin essentially daily, and ibuprofen as an alternative.  She was not familiar with medication overuse headaches.    Mild intermittent asthma without complication:  Notes that she very rarely uses her albuterol inhaler, typically only when her allergies flare in summer and fall.  She does take Zyrtec daily, this is effective at preventing runny nose, sneezing, itchy eyes.    Today's Vitals:   BP Readings from Last 3 Encounters:   01/10/22 124/86   08/03/21 129/66   06/17/21 122/78   ----------------    I spent 45 minutes with this patient today. All changes were made via collaborative practice agreement with Maya Ovalle CNP. A copy of the visit note was provided to the patient's provider(s).    The patient was sent via [employer] a summary of these recommendations.     Venkata Paz, PharmD, BCACP  Medication Management (MTM) Pharmacist  [facility]    Telemedicine Visit Details  Type of service:  Telephone visit  Start Time: 12:30PM  End Time: 1:15PM  Originating Location (patient location): Home  Distant Location (provider location):  [facility]     Medication Therapy Recommendations  Migraine without aura and without status migrainosus, not intractable    Current Medication: aspirin-acetaminophen-caffeine (EXCEDRIN MIGRAINE) 250-250-65 MG tablet   Rationale: Undesirable effect - Adverse medication event - Safety   Recommendation: Discontinue Medication   Status: Accepted - no CPA Needed          Current Medication: ibuprofen (ADVIL/MOTRIN) 200 MG tablet   Rationale: Undesirable effect - Adverse medication event - Safety   Recommendation: Discontinue Medication   Status: Accepted - no CPA Needed         Type 2 diabetes mellitus without complication, without long-term current use of insulin (H)    Current Medication: LEVEMIR FLEXTOUCH 100 UNIT/ML pen (Discontinued)   Rationale: Dose too low - Dosage too low - Effectiveness   Recommendation: Increase Dose   Status: Accepted per CPA          Current Medication: semaglutide (OZEMPIC, 0.25 OR 0.5 MG/DOSE,) 2 MG/1.5ML SOPN pen   Rationale: Medication requires monitoring -  Needs additional monitoring   Recommendation: Self-Monitoring - FreeStyle Jasbir 2 Sensor Misc   Status: Accepted per CPA

## 2022-01-13 NOTE — PATIENT INSTRUCTIONS
PLAN:                            1. Initiate freestyle jasbir 2  2. Increase Levemir to 30 units once daily  3. Hold ibuprofen and Excedrin Migraine    Freestyle Jasbir education:   https://www.freestyle.abbott/us-en/margy.html (free trial)     Follow-up: Return in about 2 weeks (around 1/27/2022) for Follow up, with me, using a MyChart eVisit.

## 2022-01-14 DIAGNOSIS — K76.0 HEPATIC STEATOSIS: Primary | ICD-10-CM

## 2022-01-21 DIAGNOSIS — E11.9 TYPE 2 DIABETES MELLITUS WITHOUT COMPLICATION, WITHOUT LONG-TERM CURRENT USE OF INSULIN (H): ICD-10-CM

## 2022-01-24 RX ORDER — SEMAGLUTIDE 1.34 MG/ML
0.25 INJECTION, SOLUTION SUBCUTANEOUS
Qty: 1.5 ML | Refills: 2 | Status: SHIPPED | OUTPATIENT
Start: 2022-01-24 | End: 2022-02-14

## 2022-01-24 NOTE — TELEPHONE ENCOUNTER
RECORDS RECEIVED FROM: Internal   Appt Date: 01.25.2022   NOTES STATUS DETAILS   OFFICE NOTE from referring provider Internal 01.11.2022 Maya Ovalle CNP   OFFICE NOTES from other specialists N/A    DISCHARGE SUMMARY from hospital N/A    MEDICATION LIST Internal    LIVER BIOSPY (IF APPLICABLE)      PATHOLOGY REPORTS  N/A    IMAGING     ENDOSCOPY (IF AVAILABLE) N/A    COLONOSCOPY (IF AVAILABLE) N/A    ULTRASOUND LIVER Internal 08.03.2021 US ABDOMEN LIMITED    01.28.2021 US ABDOMEN COMPLETE   CT OF ABDOMEN N/A    MRI OF LIVER Internal 08.03.2021 MR ABDOMEN MRCP W/O   FIBROSCAN, US ELASTOGRAPHY, FIBROSIS SCAN, MR ELASTOGRAPHY N/A    LABS     HEPATIC PANEL (LIVER PANEL) N/A    BASIC METABOLIC PANEL N/A    COMPLETE METABOLIC PANEL Internal 08.03.2021   COMPLETE BLOOD COUNT (CBC) Internal 08.03.2021   INTERNATIONAL NORMALIZED RATIO (INR) Internal 01.10.2022    HEPATITIS C ANTIBODY Internal 01.10.2022   HEPATITIS C VIRAL LOAD/PCR N/A    HEPATITIS C GENOTYPE N/A    HEPATITIS B SURFACE ANTIGEN Internal 01.10.2022   HEPATITIS B SURFACE ANTIBODY Internal 01.10.2022   HEPATITIS B DNA QUANT LEVEL N/A    HEPATITIS B CORE ANTIBODY Internal 01.10.2022

## 2022-01-24 NOTE — TELEPHONE ENCOUNTER
"Last Written Prescription Date:  1/10/22  Last Fill Quantity: 1 ml,  # refills: 0   Last office visit provider:  1/10/22     Requested Prescriptions   Pending Prescriptions Disp Refills     OZEMPIC (0.25 OR 0.5 MG/DOSE) 2 MG/1.5ML SOPN pen [Pharmacy Med Name: OZEMPIC 0.25 OR 0.5 MG/DOSE PEN INJ 2MG/1.5ML] 1.5 mL 5     Sig: INJECT 0.25 MG UNDER THE SKIN EVERY 7 DAYS (NEED OFFICE VISIT BEFORE FURTHER REFILLS)       GLP-1 Agonists Protocol Passed - 1/21/2022  2:11 PM        Passed - HgbA1C in past 3 or 6 months     If HgbA1C is 8 or greater, it needs to be on file within the past 3 months.  If less than 8, must be on file within the past 6 months.     Recent Labs   Lab Test 01/10/22  1124   A1C 10.6*             Passed - Medication is active on med list        Passed - Patient is age 18 or older        Passed - No active pregnancy on record        Passed - Normal serum creatinine on file in past 12 months     Recent Labs   Lab Test 01/10/22  1124   CR 0.74       Ok to refill medication if creatinine is low          Passed - No positive pregnancy test in past 12 months        Passed - Recent (6 mo) or future (30 days) visit within the authorizing provider's specialty     Patient had office visit in the last 6 months or has a visit in the next 30 days with authorizing provider.  See \"Patient Info\" tab in inbasket, or \"Choose Columns\" in Meds & Orders section of the refill encounter.                 Paulie Hernández RN 01/24/22 9:54 AM  "

## 2022-01-25 ENCOUNTER — PRE VISIT (OUTPATIENT)
Dept: GASTROENTEROLOGY | Facility: CLINIC | Age: 42
End: 2022-01-25
Payer: COMMERCIAL

## 2022-01-26 ENCOUNTER — VIRTUAL VISIT (OUTPATIENT)
Dept: FAMILY MEDICINE | Facility: CLINIC | Age: 42
End: 2022-01-26
Payer: COMMERCIAL

## 2022-01-26 ENCOUNTER — OFFICE VISIT (OUTPATIENT)
Dept: FAMILY MEDICINE | Facility: CLINIC | Age: 42
End: 2022-01-26
Payer: COMMERCIAL

## 2022-01-26 VITALS — HEART RATE: 95 BPM | RESPIRATION RATE: 22 BRPM | OXYGEN SATURATION: 95 %

## 2022-01-26 DIAGNOSIS — R06.02 SHORTNESS OF BREATH: Primary | ICD-10-CM

## 2022-01-26 DIAGNOSIS — R05.9 COUGH: Primary | ICD-10-CM

## 2022-01-26 LAB
FLUAV AG SPEC QL IA: NEGATIVE
FLUBV AG SPEC QL IA: NEGATIVE

## 2022-01-26 PROCEDURE — 99213 OFFICE O/P EST LOW 20 MIN: CPT | Performed by: NURSE PRACTITIONER

## 2022-01-26 PROCEDURE — U0003 INFECTIOUS AGENT DETECTION BY NUCLEIC ACID (DNA OR RNA); SEVERE ACUTE RESPIRATORY SYNDROME CORONAVIRUS 2 (SARS-COV-2) (CORONAVIRUS DISEASE [COVID-19]), AMPLIFIED PROBE TECHNIQUE, MAKING USE OF HIGH THROUGHPUT TECHNOLOGIES AS DESCRIBED BY CMS-2020-01-R: HCPCS | Mod: 90 | Performed by: NURSE PRACTITIONER

## 2022-01-26 PROCEDURE — 99000 SPECIMEN HANDLING OFFICE-LAB: CPT | Performed by: NURSE PRACTITIONER

## 2022-01-26 PROCEDURE — U0005 INFEC AGEN DETEC AMPLI PROBE: HCPCS | Mod: 90 | Performed by: NURSE PRACTITIONER

## 2022-01-26 PROCEDURE — 87804 INFLUENZA ASSAY W/OPTIC: CPT | Performed by: NURSE PRACTITIONER

## 2022-01-26 PROCEDURE — 99207 PR NON-BILLABLE SERV PER CHARTING: CPT | Performed by: NURSE PRACTITIONER

## 2022-01-27 LAB — SARS-COV-2 RNA RESP QL NAA+PROBE: NOT DETECTED

## 2022-01-27 NOTE — PROGRESS NOTES
Assessment & Plan   1. Cough  Two day history of body aches, cough. Initially video visit. Asked to come in due to complaints of dyspnea.  Her oxygen saturation is well within range today and breathing appears unlabored, lungs clear.  Safe for continued supportive cares at home. Influenza swab negative. COVID pending.  Advised follow up if breathing worsens  - Influenza A & B Antigen - Clinic Collect  - Symptomatic; Unknown COVID-19 Virus (Coronavirus) by PCR; Future  - Symptomatic; Unknown COVID-19 Virus (Coronavirus) by PCR Nose  - Symptomatic; Unknown COVID-19 Virus (Coronavirus) by PCR Nose    Maya Ovalle, CNP    Subjective     HPI:   Ambar Rodriguez is a 41 year old female who presents for fever, shortness of breath.     Video visit converted into office visit due to dyspnea.     She states symptoms began yesterday.  Worse today.  Body aches, fatigue, cough.  No fever though has felt chilled. She is feeling short of breath. Not at rest though with moving around the house. Feels it is difficult to take a deep breath.   COVID positive two weeks. She did take a home test yesterday that was negative.        Allergies:  is allergic to alprazolam and clonazepam.    SH/FH:  Social History and Family History reviewed and updated.   Tobacco Status:  She  reports that she has never smoked. She has never used smokeless tobacco.    Review of Systems:  A complete head to toe ROS is negative unless otherwise noted in HPI    Objective   Pulse 95   Resp 22   SpO2 95%     Physical Exam:  GENERAL: Alert, ill appearing, fatigued  SKIN: No rashes  EARS: TMs pearly grey, no bulging, redness, retraction.   MOUTH: Pharynx moist, pink without exudate. No tonsillar enlargement  NECK: No lymphadenopathy.   CV: Regular rate and rhythm without murmurs, rubs or gallops.  RESP: Lung sounds clear, no rhonchi or rales

## 2022-02-11 ENCOUNTER — MYC MEDICAL ADVICE (OUTPATIENT)
Dept: FAMILY MEDICINE | Facility: CLINIC | Age: 42
End: 2022-02-11
Payer: COMMERCIAL

## 2022-02-11 DIAGNOSIS — E11.9 TYPE 2 DIABETES MELLITUS WITHOUT COMPLICATION, WITHOUT LONG-TERM CURRENT USE OF INSULIN (H): ICD-10-CM

## 2022-02-11 RX ORDER — INSULIN DETEMIR 100 [IU]/ML
30 INJECTION, SOLUTION SUBCUTANEOUS AT BEDTIME
Qty: 30 ML | Refills: 1 | Status: CANCELLED | OUTPATIENT
Start: 2022-02-11

## 2022-02-11 RX ORDER — SEMAGLUTIDE 1.34 MG/ML
0.25 INJECTION, SOLUTION SUBCUTANEOUS
Qty: 1.5 ML | Refills: 2 | Status: CANCELLED | OUTPATIENT
Start: 2022-02-11

## 2022-02-11 NOTE — LETTER
February 14, 2022      Ambar SOSA Jennifer  [address] [place] [address]        To Whom It May Concern:    Ambar Rodriguez  was seen on 1/26 and is excused from work 1/26-1/28 due to illness.         Sincerely,        Maya Ovalle, NILAY, FNP

## 2022-02-14 RX ORDER — INSULIN DETEMIR 100 [IU]/ML
30 INJECTION, SOLUTION SUBCUTANEOUS AT BEDTIME
Qty: 45 ML | Refills: 1 | Status: SHIPPED | OUTPATIENT
Start: 2022-02-14 | End: 2023-03-31

## 2022-02-14 RX ORDER — SEMAGLUTIDE 1.34 MG/ML
1 INJECTION, SOLUTION SUBCUTANEOUS
Qty: 12 ML | Refills: 3 | Status: SHIPPED | OUTPATIENT
Start: 2022-02-14 | End: 2022-02-21 | Stop reason: ALTCHOICE

## 2022-02-21 ENCOUNTER — TELEPHONE (OUTPATIENT)
Dept: FAMILY MEDICINE | Facility: CLINIC | Age: 42
End: 2022-02-21
Payer: COMMERCIAL

## 2022-02-21 DIAGNOSIS — Z76.0 ENCOUNTER FOR MEDICATION REFILL: Primary | ICD-10-CM

## 2022-02-21 DIAGNOSIS — E11.9 TYPE 2 DIABETES MELLITUS WITHOUT COMPLICATION, WITHOUT LONG-TERM CURRENT USE OF INSULIN (H): ICD-10-CM

## 2022-02-21 DIAGNOSIS — E11.9 TYPE 2 DIABETES MELLITUS WITHOUT COMPLICATION, WITHOUT LONG-TERM CURRENT USE OF INSULIN (H): Primary | ICD-10-CM

## 2022-02-21 RX ORDER — SEMAGLUTIDE 1.34 MG/ML
1 INJECTION, SOLUTION SUBCUTANEOUS WEEKLY
Qty: 9 ML | Refills: 3 | Status: SHIPPED | OUTPATIENT
Start: 2022-02-21 | End: 2023-01-24

## 2022-02-21 NOTE — TELEPHONE ENCOUNTER
[facility] calling.  What strength of Ozempic are they suppose to fill?      semaglutide (OZEMPIC, 0.25 OR 0.5 MG/DOSE,)     Please send new prescription.

## 2022-02-22 RX ORDER — PEN NEEDLE, DIABETIC 32GX 5/32"
NEEDLE, DISPOSABLE MISCELLANEOUS
Qty: 200 EACH | Refills: 3 | Status: SHIPPED | OUTPATIENT
Start: 2022-02-22 | End: 2023-12-01

## 2022-03-16 ENCOUNTER — OFFICE VISIT (OUTPATIENT)
Dept: FAMILY MEDICINE | Facility: CLINIC | Age: 42
End: 2022-03-16
Payer: COMMERCIAL

## 2022-03-16 VITALS
DIASTOLIC BLOOD PRESSURE: 84 MMHG | BODY MASS INDEX: 43.4 KG/M2 | WEIGHT: 293 LBS | OXYGEN SATURATION: 95 % | TEMPERATURE: 98.2 F | HEIGHT: 69 IN | RESPIRATION RATE: 16 BRPM | SYSTOLIC BLOOD PRESSURE: 110 MMHG | HEART RATE: 85 BPM

## 2022-03-16 DIAGNOSIS — E11.9 TYPE 2 DIABETES MELLITUS WITHOUT COMPLICATION, WITH LONG-TERM CURRENT USE OF INSULIN (H): Primary | ICD-10-CM

## 2022-03-16 DIAGNOSIS — Z79.4 TYPE 2 DIABETES MELLITUS WITHOUT COMPLICATION, WITH LONG-TERM CURRENT USE OF INSULIN (H): Primary | ICD-10-CM

## 2022-03-16 DIAGNOSIS — H69.91 DYSFUNCTION OF RIGHT EUSTACHIAN TUBE: ICD-10-CM

## 2022-03-16 DIAGNOSIS — G43.009 MIGRAINE WITHOUT AURA AND WITHOUT STATUS MIGRAINOSUS, NOT INTRACTABLE: ICD-10-CM

## 2022-03-16 DIAGNOSIS — R74.8 ELEVATED LIVER ENZYMES: ICD-10-CM

## 2022-03-16 PROCEDURE — 99214 OFFICE O/P EST MOD 30 MIN: CPT | Performed by: NURSE PRACTITIONER

## 2022-03-16 RX ORDER — TRAMADOL HYDROCHLORIDE 50 MG/1
50 TABLET ORAL EVERY 6 HOURS PRN
Qty: 30 TABLET | Refills: 0 | Status: SHIPPED | OUTPATIENT
Start: 2022-03-16 | End: 2023-02-01

## 2022-03-16 NOTE — PATIENT INSTRUCTIONS
Recommendations from today's visit                                                       1. Blood sugars look fantastic! Lets check labs in one month.  April 10th or later with a lab-only appointment    2. Ears: Try Flonase or Nasocort.  If not helpful in 1-2 months, try daily Pepcid    Next appointment: one month, labs    To reschedule your appointment, please call the clinic directly at [phone].   It was a pleasure seeing you today! I look forward to seeing you again.

## 2022-03-16 NOTE — PROGRESS NOTES
Assessment & Plan   1. Type 2 diabetes mellitus without complication, with long-term current use of insulin (H)  Will be due for A1C in one month and future orders placed.  Previously uncontrolled with A1C 10.9, however the past month her TIR has been 100% after some significant dietary changes. Expect to see A1C <7 based on CGM data.  Tolerating medications well.  Discussed tapering down on Levemir if AM readings consistently <100.  Follow up one month labs and four months next DM visit  - Hemoglobin A1c; Future    2. Elevated liver enzymes  Recheck. Will be rescheduling hepatology consult  - Comprehensive metabolic panel (BMP + Alb, Alk Phos, ALT, AST, Total. Bili, TP); Future    3. Migraine without aura and without status migrainosus, not intractable  Has not tolerated triptans.  Using Tramadol approximately once a week for severe HA, effective  - traMADol (ULTRAM) 50 MG tablet; Take 1 tablet (50 mg) by mouth every 6 hours as needed (migraine)  Dispense: 30 tablet; Refill: 0    4. Dysfunction of right eustachian tube  Continue Zyrtec.  Recommended addition of nasal corticosteroid. If ineffective, consider daily H2 blocker    Maya Ovalle, CNP    Subjective     HPI:   Ambar Jin is a 41 year old female who presents for follow up    Currently on Metformin, Ozempic 1mg, Levemir 20 units.  Met with MTM and started Freestyle Jasbir. Increase in Ozempic to 1mg well tolerated, denies side effects.  She feels CGM has been extremely helpful.  Has been able to see the effects of certain foods on blood sugars.  First month was highly variable.  The last six weeks she has been able to keep blood sugars almost entirely in range with dietary changes. High protein, vegetables, very little carbohydrate.  Brings in Jasbir ciera.  % last one month.      Elevated liver enzymes - referred to hepatology. Negative hepatitis workup. She missed appointment d/t illness and will reschedule.     Plugged ears bilaterally.   "No pain.  Taking daily Zyrtec. Does experience increased allergy symptoms in the spring. Unaware of reflux.     Migraine:  Using Tramadol approximately once a week.  Has cut back on NSAIDs to very rarely. No acetaminophen use.  Tramadol effective when taken.     Allergies:  is allergic to alprazolam and clonazepam.    SH/FH:  Social History and Family History reviewed and updated.   Tobacco Status:  She  reports that she has never smoked. She has never used smokeless tobacco.    Review of Systems:  A complete head to toe ROS is negative unless otherwise noted in HPI    Objective     Vitals:    03/16/22 0830   BP: 110/84   BP Location: Left arm   Patient Position: Sitting   Cuff Size: Adult Large   Pulse: 85   Resp: 16   Temp: 98.2  F (36.8  C)   TempSrc: Oral   SpO2: 95%   Weight: 143.9 kg (317 lb 5 oz)   Height: 1.746 m (5' 8.75\")       Physical Exam:  GENERAL: Alert, well-appearing   PSYCH: Pleasant mood, affect appropriate.  Good judgment and insight.  Intact recent and remote memory.  Good eye contact.  EARS: TMs pearly grey, no bulging.  Right sided serous effusion.              "

## 2022-04-11 ENCOUNTER — LAB (OUTPATIENT)
Dept: LAB | Facility: CLINIC | Age: 42
End: 2022-04-11
Payer: COMMERCIAL

## 2022-04-11 DIAGNOSIS — E11.9 TYPE 2 DIABETES MELLITUS WITHOUT COMPLICATION, WITH LONG-TERM CURRENT USE OF INSULIN (H): ICD-10-CM

## 2022-04-11 DIAGNOSIS — R74.8 ELEVATED LIVER ENZYMES: ICD-10-CM

## 2022-04-11 DIAGNOSIS — Z79.4 TYPE 2 DIABETES MELLITUS WITHOUT COMPLICATION, WITH LONG-TERM CURRENT USE OF INSULIN (H): ICD-10-CM

## 2022-04-11 LAB
ALBUMIN SERPL-MCNC: 3.7 G/DL (ref 3.5–5)
ALP SERPL-CCNC: 103 U/L (ref 45–120)
ALT SERPL W P-5'-P-CCNC: 121 U/L (ref 0–45)
ANION GAP SERPL CALCULATED.3IONS-SCNC: 12 MMOL/L (ref 5–18)
AST SERPL W P-5'-P-CCNC: 52 U/L (ref 0–40)
BILIRUB SERPL-MCNC: 0.4 MG/DL (ref 0–1)
BUN SERPL-MCNC: 16 MG/DL (ref 8–22)
CALCIUM SERPL-MCNC: 9.6 MG/DL (ref 8.5–10.5)
CHLORIDE BLD-SCNC: 103 MMOL/L (ref 98–107)
CO2 SERPL-SCNC: 27 MMOL/L (ref 22–31)
CREAT SERPL-MCNC: 0.7 MG/DL (ref 0.6–1.1)
GFR SERPL CREATININE-BSD FRML MDRD: >90 ML/MIN/1.73M2
GLUCOSE BLD-MCNC: 141 MG/DL (ref 70–125)
HBA1C MFR BLD: 6.1 % (ref 0–5.6)
POTASSIUM BLD-SCNC: 4 MMOL/L (ref 3.5–5)
PROT SERPL-MCNC: 7.4 G/DL (ref 6–8)
SODIUM SERPL-SCNC: 142 MMOL/L (ref 136–145)

## 2022-04-11 PROCEDURE — 80053 COMPREHEN METABOLIC PANEL: CPT

## 2022-04-11 PROCEDURE — 36415 COLL VENOUS BLD VENIPUNCTURE: CPT

## 2022-04-11 PROCEDURE — 83036 HEMOGLOBIN GLYCOSYLATED A1C: CPT

## 2022-04-22 ENCOUNTER — TRANSFERRED RECORDS (OUTPATIENT)
Dept: MULTI SPECIALTY CLINIC | Facility: CLINIC | Age: 42
End: 2022-04-22
Payer: COMMERCIAL

## 2022-04-22 LAB — RETINOPATHY: NORMAL

## 2022-06-02 DIAGNOSIS — E11.9 TYPE 2 DIABETES MELLITUS WITHOUT COMPLICATION, WITHOUT LONG-TERM CURRENT USE OF INSULIN (H): ICD-10-CM

## 2022-06-04 NOTE — TELEPHONE ENCOUNTER
"Routing refill request to provider for review/approval because:  Drug interaction warning    Last Written Prescription Date:  1/13/22  Last Fill Quantity: 90,  # refills: 1   Last office visit provider:  3/16/22     Requested Prescriptions   Pending Prescriptions Disp Refills     escitalopram (LEXAPRO) 20 MG tablet [Pharmacy Med Name: ESCITALOPRAM TABS 20MG] 90 tablet 2     Sig: TAKE 1 TABLET DAILY       SSRIs Protocol Passed - 6/2/2022 11:16 PM        Passed - Recent (12 mo) or future (30 days) visit within the authorizing provider's specialty     Patient has had an office visit with the authorizing provider or a provider within the authorizing providers department within the previous 12 mos or has a future within next 30 days. See \"Patient Info\" tab in inbasket, or \"Choose Columns\" in Meds & Orders section of the refill encounter.              Passed - Medication is active on med list        Passed - Patient is age 18 or older        Passed - No active pregnancy on record        Passed - No positive pregnancy test in last 12 months             Victoria Ivan 06/04/22 4:09 PM  "

## 2022-06-06 DIAGNOSIS — E11.9 TYPE 2 DIABETES MELLITUS WITHOUT COMPLICATION, WITHOUT LONG-TERM CURRENT USE OF INSULIN (H): ICD-10-CM

## 2022-06-06 RX ORDER — ESCITALOPRAM OXALATE 20 MG/1
TABLET ORAL
Qty: 90 TABLET | Refills: 2 | Status: SHIPPED | OUTPATIENT
Start: 2022-06-06 | End: 2023-03-01

## 2022-06-07 NOTE — TELEPHONE ENCOUNTER
Routing remaining refills to mail order pharmacy.   Kayleigh Alvarez RN   06/07/22 2:46 PM  [facility] Nurse Advisor

## 2022-06-17 ENCOUNTER — APPOINTMENT (OUTPATIENT)
Dept: CT IMAGING | Facility: CLINIC | Age: 42
End: 2022-06-17
Attending: EMERGENCY MEDICINE
Payer: COMMERCIAL

## 2022-06-17 ENCOUNTER — OFFICE VISIT (OUTPATIENT)
Dept: URGENT CARE | Facility: URGENT CARE | Age: 42
End: 2022-06-17
Payer: COMMERCIAL

## 2022-06-17 ENCOUNTER — APPOINTMENT (OUTPATIENT)
Dept: CT IMAGING | Facility: CLINIC | Age: 42
End: 2022-06-17
Attending: STUDENT IN AN ORGANIZED HEALTH CARE EDUCATION/TRAINING PROGRAM
Payer: COMMERCIAL

## 2022-06-17 ENCOUNTER — ANCILLARY PROCEDURE (OUTPATIENT)
Dept: GENERAL RADIOLOGY | Facility: CLINIC | Age: 42
End: 2022-06-17
Attending: PHYSICIAN ASSISTANT
Payer: COMMERCIAL

## 2022-06-17 ENCOUNTER — HOSPITAL ENCOUNTER (EMERGENCY)
Facility: CLINIC | Age: 42
Discharge: HOME | End: 2022-06-17
Attending: EMERGENCY MEDICINE | Admitting: EMERGENCY MEDICINE
Payer: COMMERCIAL

## 2022-06-17 VITALS
DIASTOLIC BLOOD PRESSURE: 95 MMHG | TEMPERATURE: 97.2 F | WEIGHT: 293 LBS | BODY MASS INDEX: 47.15 KG/M2 | SYSTOLIC BLOOD PRESSURE: 159 MMHG | OXYGEN SATURATION: 96 % | HEART RATE: 106 BPM | RESPIRATION RATE: 16 BRPM

## 2022-06-17 VITALS
OXYGEN SATURATION: 98 % | DIASTOLIC BLOOD PRESSURE: 98 MMHG | TEMPERATURE: 98.7 F | HEART RATE: 98 BPM | SYSTOLIC BLOOD PRESSURE: 149 MMHG

## 2022-06-17 DIAGNOSIS — S00.11XA PERIORBITAL ECCHYMOSIS OF RIGHT EYE, INITIAL ENCOUNTER: ICD-10-CM

## 2022-06-17 DIAGNOSIS — M25.561 ACUTE PAIN OF RIGHT KNEE: ICD-10-CM

## 2022-06-17 DIAGNOSIS — S09.90XA TRAUMATIC INJURY OF HEAD, INITIAL ENCOUNTER: Primary | ICD-10-CM

## 2022-06-17 PROCEDURE — 72125 CT NECK SPINE W/O DYE: CPT

## 2022-06-17 PROCEDURE — 99213 OFFICE O/P EST LOW 20 MIN: CPT | Performed by: PHYSICIAN ASSISTANT

## 2022-06-17 PROCEDURE — 73562 X-RAY EXAM OF KNEE 3: CPT | Mod: TC | Performed by: RADIOLOGY

## 2022-06-17 PROCEDURE — 999N000104 HC STATISTIC NO CHARGE

## 2022-06-17 PROCEDURE — 70486 CT MAXILLOFACIAL W/O DYE: CPT

## 2022-06-17 PROCEDURE — 70450 CT HEAD/BRAIN W/O DYE: CPT

## 2022-06-17 NOTE — PROGRESS NOTES
Assessment & Plan     1. Traumatic injury of head, initial encounter    2. Periorbital ecchymosis of right eye, initial encounter      3. Acute pain of right knee  - XR Knee Right 3 Views; Future    She has significant hematoma and bruising around mostly her right eye, but her left eye as well. Slight ridge felt at anterior frontal scalp with a palpable hematoma.     aNdya Mejias, JEWEL  [facility] [place]    CHIEF COMPLAINT:   Chief Complaint   Patient presents with     Urgent Care     Fell and hiy her head on the deck, has a big bump and knee really hurts. And has a black eye      Subjective     Geni is a 41 year old female who presents to clinic today for evaluation after head injury.   Yesterday, she was walking up her deck stairs and her flip flop got caught in the deck stairs. She fell and landed on her right knee and her forehead. She had immediate pain. Today, noted increased swelling, slight headache and bruising around her eyes. Denies having LOC. She has not had any episodes of vomiting.       No past medical history on file.  Past Surgical History:   Procedure Laterality Date     CHOLECYSTECTOMY       FOOT FRACTURE SURGERY Left     x3     FOOT FRACTURE SURGERY Left 2011     Social History     Tobacco Use     Smoking status: Never Smoker     Smokeless tobacco: Never Used   Substance Use Topics     Alcohol use: Yes     Comment: twice per month      Current Outpatient Medications   Medication     cetirizine HCl (ZYRTEC ORAL)     cholecalciferol, vitamin D3, (VITAMIN D3 ORAL)     escitalopram (LEXAPRO) 10 MG tablet     insulin detemir (LEVEMIR FLEXTOUCH) 100 UNIT/ML pen     metFORMIN (GLUCOPHAGE-XR) 500 MG 24 hr tablet     Semaglutide, 1 MG/DOSE, (OZEMPIC, 1 MG/DOSE,) 4 MG/3ML SOPN     traMADol (ULTRAM) 50 MG tablet     albuterol (PROAIR HFA;PROVENTIL HFA;VENTOLIN HFA) 90 mcg/actuation inhaler     BD DONNELL U/F 32G X 4 MM insulin pen needle     blood glucose test strips     Continuous  Blood Gluc Sensor (FREESTYLE DOROTHY 2 SENSOR) [facility]     escitalopram (LEXAPRO) 20 MG tablet     ibuprofen (ADVIL/MOTRIN) 200 MG tablet     lancets (ONETOUCH DELICA LANCETS) 33 gauge Misc     No current facility-administered medications for this visit.     Allergies   Allergen Reactions     Alprazolam Other (See Comments)     Irritable, no memory of what happened     Clonazepam Dizziness     No memory of what happened while on medication       10 point ROS of systems were all negative except for pertinent positives noted in my HPI.      Exam:   BP (!) 149/98 (BP Location: Right arm, Patient Position: Sitting, Cuff Size: Adult Large)   Pulse 98   Temp 98.7  F (37.1  C)   LMP 06/14/2022   SpO2 98%   Constitutional: healthy, alert and no distress  Head:Frontal scalp hematoma.   Eyes: conjunctiva clear, no drainage. She has bruising around right eye. Left eye with slight bruising. FROM  ENT: TMs clear and shiny araceli, nasal mucosa pink and moist, throat without tonsillar hypertrophy or erythema  Neck: neck is supple, no cervical lymphadenopathy or nuchal rigidity  Cardiovascular: RRR  Respiratory: CTA bilaterally, no rhonchi or rales  Skin: no rashes  Neurologic: Speech clear, gait normal. Moves all extremities.    XR knee-- No abnormality per my read, pending radiology report

## 2022-06-17 NOTE — ED TRIAGE NOTES
Pt arrives to ED with c/o fall that occurred last night. Pt states she fell face first onto the deck steps while walking up them. Pt c/o right knee pain and head pain due to fall. Comes from urgent care where they already did a knee xray and everything looks fine sent here for head CT. No LOC, not on blood thinners. Pt has bruising to right eye and goose egg to middle forehead. Alert and oriented.      Triage Assessment     Row Name 06/17/22 0599       Triage Assessment (Adult)    Airway WDL WDL       Respiratory WDL    Respiratory WDL WDL       Skin Circulation/Temperature WDL    Skin Circulation/Temperature WDL WDL       Cardiac WDL    Cardiac WDL WDL       Peripheral/Neurovascular WDL    Peripheral Neurovascular WDL WDL       Cognitive/Neuro/Behavioral WDL    Cognitive/Neuro/Behavioral WDL WDL

## 2022-06-18 NOTE — ED NOTES
Bed: [facility]02  Expected date:   Expected time:   Means of arrival:   Comments:  Lab draws, iv starts

## 2022-06-19 ASSESSMENT — PATIENT HEALTH QUESTIONNAIRE - PHQ9
10. IF YOU CHECKED OFF ANY PROBLEMS, HOW DIFFICULT HAVE THESE PROBLEMS MADE IT FOR YOU TO DO YOUR WORK, TAKE CARE OF THINGS AT HOME, OR GET ALONG WITH OTHER PEOPLE: SOMEWHAT DIFFICULT
SUM OF ALL RESPONSES TO PHQ QUESTIONS 1-9: 9
SUM OF ALL RESPONSES TO PHQ QUESTIONS 1-9: 9

## 2022-06-20 ENCOUNTER — OFFICE VISIT (OUTPATIENT)
Dept: PEDIATRICS | Facility: CLINIC | Age: 42
End: 2022-06-20
Payer: COMMERCIAL

## 2022-06-20 VITALS
SYSTOLIC BLOOD PRESSURE: 118 MMHG | OXYGEN SATURATION: 97 % | DIASTOLIC BLOOD PRESSURE: 70 MMHG | RESPIRATION RATE: 16 BRPM | BODY MASS INDEX: 46.86 KG/M2 | WEIGHT: 293 LBS | TEMPERATURE: 97.5 F | HEART RATE: 72 BPM

## 2022-06-20 DIAGNOSIS — S00.10XD PERIORBITAL ECCHYMOSIS, UNSPECIFIED LATERALITY, SUBSEQUENT ENCOUNTER: ICD-10-CM

## 2022-06-20 DIAGNOSIS — S09.90XD TRAUMATIC INJURY OF HEAD, SUBSEQUENT ENCOUNTER: Primary | ICD-10-CM

## 2022-06-20 DIAGNOSIS — M25.561 ACUTE PAIN OF RIGHT KNEE: ICD-10-CM

## 2022-06-20 PROCEDURE — 99215 OFFICE O/P EST HI 40 MIN: CPT | Performed by: NURSE PRACTITIONER

## 2022-06-20 RX ORDER — ONDANSETRON 4 MG/1
4 TABLET, ORALLY DISINTEGRATING ORAL EVERY 8 HOURS PRN
Qty: 15 TABLET | Refills: 0 | Status: SHIPPED | OUTPATIENT
Start: 2022-06-20 | End: 2022-08-31

## 2022-06-20 RX ORDER — HYDROCODONE BITARTRATE AND ACETAMINOPHEN 5; 325 MG/1; MG/1
1 TABLET ORAL EVERY 6 HOURS PRN
Qty: 5 TABLET | Refills: 0 | Status: SHIPPED | OUTPATIENT
Start: 2022-06-20 | End: 2022-08-31

## 2022-06-20 ASSESSMENT — PAIN SCALES - GENERAL: PAINLEVEL: SEVERE PAIN (6)

## 2022-06-20 NOTE — LETTER
[facility] [place]  [address]  [address]  [place] [address]  [phone]          June 20, 2022    RE:  Ambar Jin                                                                                                                                                       [address] [place] [address]            To whom it may concern:    Ambar Jin was evaluated by me today in clinic. She suffered a fall resulting in a head injury on June 16, 2022. I suspect she has a concussion. Given her ongoing neurological symptoms, I recommend she take it easy this week. She is formally excused from any work-related absences taking place week of June 20 through June 24, 2022.      Sincerely,          Ira Marcus, NILAY, APRN, CNP

## 2022-06-20 NOTE — PROGRESS NOTES
Assessment & Plan     Traumatic injury of head, subsequent encounter  Periorbital ecchymosis, unspecified laterality, subsequent encounter  Neurologically intact. Suspect concussion. Reviewed management of this and counseled on red flag symptoms warranting immediate evaluation in the ED. Will provide rx for zofran and very limited supply of norco (#5 tabs). Referral to concussion clinic. Given letter excusing absences from work this week. Return to primary care as needed.  - Concussion  Referral; Future  - ondansetron (ZOFRAN ODT) 4 MG ODT tab; Take 1 tablet (4 mg) by mouth every 8 hours as needed for nausea  - HYDROcodone-acetaminophen (NORCO) 5-325 MG tablet; Take 1 tablet by mouth every 6 hours as needed for pain    Acute pain of right knee  Much improved, continue conservative management. Reviewed imaging with the patient. X-ray negative for fracture.       40 minutes spent on the date of the encounter doing chart review, review of test results, interpretation of tests, patient visit and documentation         MEDICATIONS:        - Trial of zofran, norco       - Continue other medications without change  CONSULTATION/REFERRAL to Concussion     Follow-up: Return to clinic if symptoms fail to improve, worsen, or new symptoms develop with the above treatment plan.     CHUY Monge CNP  [facility] JESSICA Arechiga is a 41 year old, presenting for the following health issues: head injury.      HPI     Presents today for follow-up after a fall on evening of 6/16 (4 days ago). She was walking up her deck stairs and her flip flop got caught on the deck stairs causing her to fall forward landing on her right knee and forehead. She presented to  the following day. X-ray of the right knee negative for fracture. Because of her periorbital ecchymosis she was referred to the ED for imaging. Head CT negative for acute intracranial hemorrhage or acute calvarial fracture.  Hematoma of the midline frontal scalp and forehead was evident. CT facial bones negative for acute displaced facial bone fracture. CT cervical spine negative for displaced fracture or traumatic subluxation. She was discharged home.     Two days ago she developed fatigue. Typically has her americano in the morning and is good to go for the day. Now she feels as though she could go right back to sleep. Goes to [employer] and feels like she ran a marathon. Continues to experience frontal headache rated 5/10 described as a dull ache and non responsive to NSAIDs or tylenol. Has also noted nausea without vomiting. Denies vision changes, double vision, and photo sensitivity. Denies issues with balance or walking. Didn't sleep very well last night, laid there for awhile after going to bed, tossed and turned.    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 Has several Fridays off.   Went back to work today. Did okay overall.     Patient Active Problem List   Diagnosis     Migraine without aura and without status migrainosus, not intractable     AMANDA (generalized anxiety disorder)     PCOS (polycystic ovarian syndrome)     Mild recurrent major depression (H)     Type 2 diabetes mellitus without complication, without long-term current use of insulin (H)     Mild intermittent asthma without complication     Morbid obesity (H)     Hepatic steatosis     No past medical history on file.    Current Outpatient Medications   Medication     albuterol (PROAIR HFA;PROVENTIL HFA;VENTOLIN HFA) 90 mcg/actuation inhaler     BD DONNELL U/F 32G X 4 MM insulin pen needle     blood glucose test strips     cetirizine HCl (ZYRTEC ORAL)     cholecalciferol, vitamin D3, (VITAMIN D3 ORAL)     Continuous Blood Gluc Sensor (FREESTYLE DOROTHY 2 SENSOR) MISC     escitalopram (LEXAPRO) 10 MG tablet     escitalopram (LEXAPRO) 20 MG tablet     ibuprofen (ADVIL/MOTRIN) 200 MG tablet     insulin detemir (LEVEMIR FLEXTOUCH) 100 UNIT/ML pen     lancets  (ONETOUCH DELICA LANCETS) 33 gauge Misc     metFORMIN (GLUCOPHAGE-XR) 500 MG 24 hr tablet     Semaglutide, 1 MG/DOSE, (OZEMPIC, 1 MG/DOSE,) 4 MG/3ML SOPN     traMADol (ULTRAM) 50 MG tablet     No current facility-administered medications for this visit.        Allergies   Allergen Reactions     Alprazolam Other (See Comments)     Irritable, no memory of what happened     Clonazepam Dizziness     No memory of what happened while on medication         Review of Systems    ROS: 10 point ROS neg other than the symptoms noted above in the HPI.        Objective    /70   Pulse 72   Temp 97.5  F (36.4  C) (Tympanic)   Resp 16   Wt 142.9 kg (315 lb)   LMP 06/14/2022 (Approximate)   SpO2 97%   BMI 46.86 kg/m    Body mass index is 46.86 kg/m .  Physical Exam  Constitutional:       General: She is not in acute distress.     Appearance: Normal appearance. She is not ill-appearing or toxic-appearing.   HENT:      Head: Raccoon eyes present.      Comments: Mass to midline frontal forehead.   Eyes:      Extraocular Movements: Extraocular movements intact.      Conjunctiva/sclera:      Right eye: Hemorrhage present.      Left eye: No hemorrhage.  Cardiovascular:      Rate and Rhythm: Normal rate.   Pulmonary:      Effort: Pulmonary effort is normal. No respiratory distress.   Skin:     General: Skin is warm and dry.   Neurological:      General: No focal deficit present.      Mental Status: She is alert and oriented to person, place, and time. Mental status is at baseline.      Cranial Nerves: No cranial nerve deficit.      Motor: No weakness.      Gait: Gait normal.   Psychiatric:         Mood and Affect: Mood normal.         Behavior: Behavior normal.         Thought Content: Thought content normal.         Judgment: Judgment normal.

## 2022-06-27 ENCOUNTER — MYC MEDICAL ADVICE (OUTPATIENT)
Dept: PEDIATRICS | Facility: CLINIC | Age: 42
End: 2022-06-27

## 2022-07-23 ENCOUNTER — HEALTH MAINTENANCE LETTER (OUTPATIENT)
Age: 42
End: 2022-07-23

## 2022-08-30 ASSESSMENT — ASTHMA QUESTIONNAIRES
QUESTION_4 LAST FOUR WEEKS HOW OFTEN HAVE YOU USED YOUR RESCUE INHALER OR NEBULIZER MEDICATION (SUCH AS ALBUTEROL): ONE OR TWO TIMES PER DAY
QUESTION_1 LAST FOUR WEEKS HOW MUCH OF THE TIME DID YOUR ASTHMA KEEP YOU FROM GETTING AS MUCH DONE AT WORK, SCHOOL OR AT HOME: NONE OF THE TIME
QUESTION_2 HOW MUCH OF A PROBLEM IS YOUR ASTHMA WHEN YOU RUN, EXCERCISE OR PLAY SPORTS: IT'S A LITTLE PROBLEM BUT IT'S OKAY.
QUESTION_3 DO YOU COUGH BECAUSE OF YOUR ASTHMA: YES, SOME OF THE TIME.
QUESTION_6 LAST FOUR WEEKS HOW MANY DAYS DID YOUR CHILD WHEEZE DURING THE DAY BECAUSE OF ASTHMA: NOT AT ALL
ACT_TOTALSCORE: 18
ACT_TOTALSCORE_PEDS: 23
QUESTION_7 LAST FOUR WEEKS HOW MANY DAYS DID YOUR CHILD WAKE UP DURING THE NIGHT BECAUSE OF ASTHMA: NOT AT ALL
QUESTION_5 LAST FOUR WEEKS HOW WOULD YOU RATE YOUR ASTHMA CONTROL: WELL CONTROLLED
QUESTION_4 DO YOU WAKE UP DURING THE NIGHT BECAUSE OF YOUR ASTHMA: NO, NONE OF THE TIME.
QUESTION_2 LAST FOUR WEEKS HOW OFTEN HAVE YOU HAD SHORTNESS OF BREATH: ONCE A DAY
QUESTION_1 HOW IS YOUR ASTHMA TODAY: VERY GOOD
ACT_TOTALSCORE: 18
QUESTION_5 LAST FOUR WEEKS HOW MANY DAYS DID YOUR CHILD HAVE ANY DAYTIME ASTHMA SYMPTOMS: 4-10 DAYS
ACT_TOTALSCORE_PEDS: 23
QUESTION_3 LAST FOUR WEEKS HOW OFTEN DID YOUR ASTHMA SYMPTOMS (WHEEZING, COUGHING, SHORTNESS OF BREATH, CHEST TIGHTNESS OR PAIN) WAKE YOU UP AT NIGHT OR EARLIER THAN USUAL IN THE MORNING: NOT AT ALL

## 2022-08-31 ENCOUNTER — OFFICE VISIT (OUTPATIENT)
Dept: PEDIATRICS | Facility: CLINIC | Age: 42
End: 2022-08-31
Payer: COMMERCIAL

## 2022-08-31 VITALS
TEMPERATURE: 97.2 F | HEART RATE: 85 BPM | OXYGEN SATURATION: 97 % | WEIGHT: 293 LBS | BODY MASS INDEX: 43.4 KG/M2 | DIASTOLIC BLOOD PRESSURE: 83 MMHG | SYSTOLIC BLOOD PRESSURE: 130 MMHG | HEIGHT: 69 IN | RESPIRATION RATE: 20 BRPM

## 2022-08-31 DIAGNOSIS — E66.01 MORBID OBESITY (H): ICD-10-CM

## 2022-08-31 DIAGNOSIS — G89.29 CHRONIC PAIN OF RIGHT KNEE: Primary | ICD-10-CM

## 2022-08-31 DIAGNOSIS — M25.561 CHRONIC PAIN OF RIGHT KNEE: Primary | ICD-10-CM

## 2022-08-31 PROCEDURE — 99214 OFFICE O/P EST MOD 30 MIN: CPT | Performed by: NURSE PRACTITIONER

## 2022-08-31 RX ORDER — PHENTERMINE HYDROCHLORIDE 15 MG/1
15 CAPSULE ORAL EVERY MORNING
Qty: 30 CAPSULE | Refills: 0 | Status: SHIPPED | OUTPATIENT
Start: 2022-08-31 | End: 2022-09-30

## 2022-08-31 ASSESSMENT — PATIENT HEALTH QUESTIONNAIRE - PHQ9
10. IF YOU CHECKED OFF ANY PROBLEMS, HOW DIFFICULT HAVE THESE PROBLEMS MADE IT FOR YOU TO DO YOUR WORK, TAKE CARE OF THINGS AT HOME, OR GET ALONG WITH OTHER PEOPLE: SOMEWHAT DIFFICULT
SUM OF ALL RESPONSES TO PHQ QUESTIONS 1-9: 5
SUM OF ALL RESPONSES TO PHQ QUESTIONS 1-9: 5

## 2022-08-31 ASSESSMENT — PAIN SCALES - GENERAL: PAINLEVEL: MODERATE PAIN (4)

## 2022-08-31 NOTE — PROGRESS NOTES
"    Assessment & Plan     Chronic pain of right knee  Chronic. Began after a fall 6/16/22. Exam is unremarkable. Pain is primarily only bothersome with certain activities like going up or down the stairs. Given her persisting symptoms, recommend follow-up with ortho for consult.  - Orthopedic  Referral; Future    Morbid obesity (H)  Body mass index is 47.72 kg/m . Plan to start phentermine 15 mg daily with close follow-up in 1 month for weight and BP recheck. She has done well on this medication previosuly. Reviewed possible adverse side effects to monitor for.   - phentermine (ADIPEX-P) 15 MG capsule; Take 1 capsule (15 mg) by mouth every morning      22 minutes spent on the date of the encounter doing chart review, patient visit and documentation        BMI:   Estimated body mass index is 47.72 kg/m  as calculated from the following:    Height as of this encounter: 1.746 m (5' 8.75\").    Weight as of this encounter: 145.5 kg (320 lb 12.8 oz).   Weight management plan: Starting phenteramine.    MEDICATIONS:        - Start taking phenteramine       - Continue other medications without change  CONSULTATION/REFERRAL to Orthopedics  FUTURE APPOINTMENTS:       - Follow-up visit in 1 month, med recheck and establish care.    Return in about 4 weeks (around 9/28/2022) for Follow-up, In-person Visit.    CHUY Monge [facility] JESSICA Arechiga is a 41 year old, presenting for the following health issues:  Knee Pain    Pain History:  When did you first notice your pain? Following a fall on 6/16/2022.  Have you seen anyone else for your pain? Yes   Description  Location: knee - right  Joint Swelling: No  Redness: No  Pain: YES  Warmth: No  Intensity:  moderate  Progression of Symptoms:  same  Accompanying signs and symptoms:   Fevers: No  Numbness/tingling/weakness: No  History  Trauma to the area: YES  Recent illness:  YES  Previous similar problem: No  Previous evaluation:  " YES  Precipitating or alleviating factors:  Aggravating factors include: climbing stairs, pushing off with the right lower extremity while sitting.  Therapies tried and outcome: ice, has tried tylenol and ibuprofen as needed and this is effective.    Would like to restart phentermine. Reports she has done well on this medication previously. She is currently taking ozempic, metformin, and levemir for management of her type 2 diabetes. Last A1C 4/11/22, 6.1%.    She and her  would like to try to conceive when she gets her health under better control.    Would like to establish care with a new provider.     Patient Active Problem List   Diagnosis     Migraine without aura and without status migrainosus, not intractable     AMANDA (generalized anxiety disorder)     PCOS (polycystic ovarian syndrome)     Mild recurrent major depression (H)     Type 2 diabetes mellitus without complication, without long-term current use of insulin (H)     Mild intermittent asthma without complication     Morbid obesity (H)     Hepatic steatosis     History reviewed. No pertinent past medical history.    Current Outpatient Medications   Medication     phentermine (ADIPEX-P) 15 MG capsule     BD DONNELL U/F 32G X 4 MM insulin pen needle     blood glucose test strips     cetirizine HCl (ZYRTEC ORAL)     cholecalciferol, vitamin D3, (VITAMIN D3 ORAL)     Continuous Blood Gluc Sensor (FREESTYLE DOROTHY 2 SENSOR) MISC     escitalopram (LEXAPRO) 10 MG tablet     escitalopram (LEXAPRO) 20 MG tablet     ibuprofen (ADVIL/MOTRIN) 200 MG tablet     insulin detemir (LEVEMIR FLEXTOUCH) 100 UNIT/ML pen     lancets (ONETOUCH DELICA LANCETS) 33 gauge Misc     metFORMIN (GLUCOPHAGE-XR) 500 MG 24 hr tablet     Semaglutide, 1 MG/DOSE, (OZEMPIC, 1 MG/DOSE,) 4 MG/3ML SOPN     traMADol (ULTRAM) 50 MG tablet     No current facility-administered medications for this visit.        Allergies   Allergen Reactions     Alprazolam Other (See Comments)     Irritable, no  "memory of what happened     Clonazepam Dizziness     No memory of what happened while on medication       Review of Systems    ROS: 10 point ROS neg other than the symptoms noted above in the HPI.        Objective    /83 (BP Location: Right arm, Patient Position: Sitting, Cuff Size: Adult Large)   Pulse 85   Temp 97.2  F (36.2  C) (Tympanic)   Resp 20   Ht 1.746 m (5' 8.75\")   Wt 145.5 kg (320 lb 12.8 oz)   LMP 07/30/2022   SpO2 97%   BMI 47.72 kg/m    Body mass index is 47.72 kg/m .  Physical Exam  Constitutional:       General: She is not in acute distress.     Appearance: Normal appearance. She is not ill-appearing or toxic-appearing.   Cardiovascular:      Rate and Rhythm: Normal rate.   Pulmonary:      Effort: Pulmonary effort is normal. No respiratory distress.   Musculoskeletal:      Right knee: No swelling, deformity, effusion or bony tenderness. Normal range of motion. No tenderness. Normal alignment.      Instability Tests: Anterior drawer test negative. Posterior drawer test negative.   Neurological:      General: No focal deficit present.      Mental Status: She is alert and oriented to person, place, and time.   Psychiatric:         Mood and Affect: Mood normal.         Behavior: Behavior normal.            " assisted living facility with home health aide

## 2022-09-27 ASSESSMENT — ASTHMA QUESTIONNAIRES
QUESTION_4 LAST FOUR WEEKS HOW OFTEN HAVE YOU USED YOUR RESCUE INHALER OR NEBULIZER MEDICATION (SUCH AS ALBUTEROL): ONCE A WEEK OR LESS
ACT_TOTALSCORE: 23
QUESTION_5 LAST FOUR WEEKS HOW WOULD YOU RATE YOUR ASTHMA CONTROL: COMPLETELY CONTROLLED
QUESTION_3 LAST FOUR WEEKS HOW OFTEN DID YOUR ASTHMA SYMPTOMS (WHEEZING, COUGHING, SHORTNESS OF BREATH, CHEST TIGHTNESS OR PAIN) WAKE YOU UP AT NIGHT OR EARLIER THAN USUAL IN THE MORNING: NOT AT ALL
QUESTION_2 LAST FOUR WEEKS HOW OFTEN HAVE YOU HAD SHORTNESS OF BREATH: ONCE OR TWICE A WEEK
ACT_TOTALSCORE: 23
QUESTION_1 LAST FOUR WEEKS HOW MUCH OF THE TIME DID YOUR ASTHMA KEEP YOU FROM GETTING AS MUCH DONE AT WORK, SCHOOL OR AT HOME: NONE OF THE TIME

## 2022-09-30 ENCOUNTER — MYC MEDICAL ADVICE (OUTPATIENT)
Dept: PEDIATRICS | Facility: CLINIC | Age: 42
End: 2022-09-30

## 2022-09-30 ENCOUNTER — OFFICE VISIT (OUTPATIENT)
Dept: PEDIATRICS | Facility: CLINIC | Age: 42
End: 2022-09-30
Payer: COMMERCIAL

## 2022-09-30 VITALS
TEMPERATURE: 97 F | DIASTOLIC BLOOD PRESSURE: 88 MMHG | OXYGEN SATURATION: 100 % | HEART RATE: 102 BPM | RESPIRATION RATE: 16 BRPM | HEIGHT: 69 IN | BODY MASS INDEX: 43.4 KG/M2 | WEIGHT: 293 LBS | SYSTOLIC BLOOD PRESSURE: 110 MMHG

## 2022-09-30 DIAGNOSIS — Z76.89 ENCOUNTER TO ESTABLISH CARE: Primary | ICD-10-CM

## 2022-09-30 DIAGNOSIS — J45.20 MILD INTERMITTENT ASTHMA WITHOUT COMPLICATION: ICD-10-CM

## 2022-09-30 DIAGNOSIS — E66.01 MORBID OBESITY (H): ICD-10-CM

## 2022-09-30 DIAGNOSIS — E11.9 TYPE 2 DIABETES MELLITUS WITHOUT COMPLICATION, WITHOUT LONG-TERM CURRENT USE OF INSULIN (H): Primary | ICD-10-CM

## 2022-09-30 DIAGNOSIS — E11.9 TYPE 2 DIABETES MELLITUS WITHOUT COMPLICATION, WITHOUT LONG-TERM CURRENT USE OF INSULIN (H): ICD-10-CM

## 2022-09-30 DIAGNOSIS — Z23 ENCOUNTER FOR ADMINISTRATION OF VACCINE: ICD-10-CM

## 2022-09-30 LAB
CHOLEST SERPL-MCNC: 157 MG/DL
FASTING STATUS PATIENT QL REPORTED: YES
HBA1C MFR BLD: 6.5 % (ref 0–5.6)
HDLC SERPL-MCNC: 41 MG/DL
LDLC SERPL CALC-MCNC: 81 MG/DL
NONHDLC SERPL-MCNC: 116 MG/DL
TRIGL SERPL-MCNC: 177 MG/DL

## 2022-09-30 PROCEDURE — 99214 OFFICE O/P EST MOD 30 MIN: CPT | Mod: 25 | Performed by: NURSE PRACTITIONER

## 2022-09-30 PROCEDURE — 80061 LIPID PANEL: CPT | Performed by: NURSE PRACTITIONER

## 2022-09-30 PROCEDURE — 90677 PCV20 VACCINE IM: CPT | Performed by: NURSE PRACTITIONER

## 2022-09-30 PROCEDURE — 99207 PR FOOT EXAM NO CHARGE: CPT | Performed by: NURSE PRACTITIONER

## 2022-09-30 PROCEDURE — 83036 HEMOGLOBIN GLYCOSYLATED A1C: CPT | Performed by: NURSE PRACTITIONER

## 2022-09-30 PROCEDURE — 0124A COVID-19,PF,PFIZER BOOSTER BIVALENT: CPT | Performed by: NURSE PRACTITIONER

## 2022-09-30 PROCEDURE — 90471 IMMUNIZATION ADMIN: CPT | Performed by: NURSE PRACTITIONER

## 2022-09-30 PROCEDURE — 91312 COVID-19,PF,PFIZER BOOSTER BIVALENT: CPT | Performed by: NURSE PRACTITIONER

## 2022-09-30 PROCEDURE — 36415 COLL VENOUS BLD VENIPUNCTURE: CPT | Performed by: NURSE PRACTITIONER

## 2022-09-30 RX ORDER — ALBUTEROL SULFATE 90 UG/1
2 AEROSOL, METERED RESPIRATORY (INHALATION) EVERY 6 HOURS
Qty: 18 G | Refills: 1 | Status: SHIPPED | OUTPATIENT
Start: 2022-09-30

## 2022-09-30 RX ORDER — ESCITALOPRAM OXALATE 10 MG/1
TABLET ORAL
Qty: 90 TABLET | Refills: 3 | Status: SHIPPED | OUTPATIENT
Start: 2022-09-30 | End: 2023-10-20

## 2022-09-30 RX ORDER — METFORMIN HCL 500 MG
2000 TABLET, EXTENDED RELEASE 24 HR ORAL
Qty: 360 TABLET | Refills: 3 | Status: SHIPPED | OUTPATIENT
Start: 2022-09-30 | End: 2023-03-21

## 2022-09-30 RX ORDER — PHENTERMINE HYDROCHLORIDE 37.5 MG/1
37.5 CAPSULE ORAL EVERY MORNING
Qty: 30 CAPSULE | Refills: 0 | Status: SHIPPED | OUTPATIENT
Start: 2022-09-30 | End: 2022-10-05

## 2022-09-30 ASSESSMENT — PAIN SCALES - GENERAL: PAINLEVEL: NO PAIN (0)

## 2022-09-30 NOTE — PROGRESS NOTES
Assessment & Plan     Encounter to establish care  Histories and medications reviewed and updated.     Type 2 diabetes mellitus without complication, without long-term current use of insulin (H)  A1C meeting goal of <7%. Taking metformin, levemir and ozempic as prescribed. Freestyle asda in place. No s/s of hypoglycemia. Recheck routine labs today, results pending.   - Lipid panel reflex to direct LDL Non-fasting  - Pneumococcal 20 Valent Conjugate (Prevnar 20)  - HEMOGLOBIN A1C  - Albumin Random Urine Quantitative with Creat Ratio  - escitalopram (LEXAPRO) 10 MG tablet; TAKE ONE TABLET BY MOUTH DAILY ALONG WITH 20MG TABLET  - metFORMIN (GLUCOPHAGE XR) 500 MG 24 hr tablet; Take 4 tablets (2,000 mg) by mouth daily (with dinner)  - FOOT EXAM    Morbid obesity (H)  Body mass index is 46.38 kg/m . Started phentermine 1 month ago, tolerating well without significant side effects. Reports a 6lb weight loss over the last month. Reasonable to increase dose today to 37.5 mg. Follow-up visit in 1 month.   - phentermine (ADIPEX-P) 37.5 MG capsule; Take 1 capsule (37.5 mg) by mouth every morning for 30 days    Encounter for administration of vaccine  - COVID-19,PF,PFIZER BOOSTER BIVALENT (12+YRS)    Mild intermittent asthma without complication  Stable. Refilled  - albuterol (PROAIR HFA/PROVENTIL HFA/VENTOLIN HFA) 108 (90 Base) MCG/ACT inhaler; Inhale 2 puffs into the lungs every 6 hours      18 minutes spent on the date of the encounter doing chart review, review of test results, patient visit and documentation        MEDICATIONS:        - Increase phentermine to 37.5 mg       - Continue other medications without change  FUTURE APPOINTMENTS:       - Follow-up visit in 1 month    Return in about 4 weeks (around 10/28/2022) for Follow-up, In-person Visit.    SILVIA العليN, RN, FNP Student    CHUY Monge [facility] JESSICA Arechiga is a 41 year old, presenting for the following  health issues:  Establish Care    Presents to establish care and for medication recheck.     #Obesity  Started phentermine 15 mg 1 month ago. Taking as prescribed without significant side effects. Does experience some dry mouth but uses gum which helps, it is not bothersome. She has lost 6 pounds since last month. Sleeping well. Appetite is decreased, paying more attention to what she eats. Not participating in physical activity but might start using the treadmill at work in the future.    #DM  Taking Metformin 2g, semaglutide 1mg weekly, and levemir 30u daily. Taking all medications as prescribed, denies side effects. Freestyle Jasbir device. Checks FBS once in the morning when she wakes up and then 2hrs after eating. They typically range 100-130's. Denies hypoglycemia. Last eye exam was in 2022. Denies lower extremity numbness, tingling, abdominal pain    #Mild intermittent asthma  Hx intermittent asthma. Allergy to cats. Seldom uses inhaler - currently it is  and needs a refill.     Patient Active Problem List   Diagnosis     Migraine without aura and without status migrainosus, not intractable     AMANDA (generalized anxiety disorder)     PCOS (polycystic ovarian syndrome)     Mild recurrent major depression (H)     Type 2 diabetes mellitus without complication, without long-term current use of insulin (H)     Mild intermittent asthma without complication     Morbid obesity (H)     Hepatic steatosis     Past Medical History:   Diagnosis Date     Anxiety      Asthma      Depression      Diabetes mellitus, type 2 (H)      PCOS (polycystic ovarian syndrome)      Past Surgical History:   Procedure Laterality Date     CHOLECYSTECTOMY       FOOT FRACTURE SURGERY Left     x3     FOOT FRACTURE SURGERY Left      Family History   Problem Relation Age of Onset     Diabetes Type 2  Mother      Depression Mother      Arthritis Mother      Cerebrovascular Disease Mother 60.00     Diabetes Type 2  Father       Depression Father      Early Death Sister         rare blood disorder     Lung Cancer Maternal Grandmother      Cerebrovascular Disease Maternal Grandmother      Throat cancer Maternal Grandfather      Diabetes Maternal Grandfather      Prostate Cancer Paternal Grandfather      Depression Sister      Asthma Sister      Liver Disease Sister         Primary biliary cholangitis      Social History     Socioeconomic History     Marital status:      Spouse name: Not on file     Number of children: Not on file     Years of education: Not on file     Highest education level: Not on file   Occupational History     Not on file   Tobacco Use     Smoking status: Never Smoker     Smokeless tobacco: Never Used   Vaping Use     Vaping Use: Never used   Substance and Sexual Activity     Alcohol use: Yes     Comment: twice per month      Drug use: Never     Sexual activity: Yes     Partners: Male   Other Topics Concern     Parent/sibling w/ CABG, MI or angioplasty before 65F 55M? Not Asked   Social History Narrative     Not on file     Social Determinants of Health     Financial Resource Strain: Not on file   Food Insecurity: Not on file   Transportation Needs: Not on file   Physical Activity: Not on file   Stress: Not on file   Social Connections: Not on file   Intimate Partner Violence: Not on file   Housing Stability: Not on file     Current Outpatient Medications   Medication     albuterol (PROAIR HFA/PROVENTIL HFA/VENTOLIN HFA) 108 (90 Base) MCG/ACT inhaler     escitalopram (LEXAPRO) 10 MG tablet     metFORMIN (GLUCOPHAGE XR) 500 MG 24 hr tablet     phentermine (ADIPEX-P) 37.5 MG capsule     BD DONNELL U/F 32G X 4 MM insulin pen needle     blood glucose test strips     cetirizine HCl (ZYRTEC ORAL)     cholecalciferol, vitamin D3, (VITAMIN D3 ORAL)     Continuous Blood Gluc Sensor (FREESTYLE DOROTHY 2 SENSOR) MISC     escitalopram (LEXAPRO) 20 MG tablet     ibuprofen (ADVIL/MOTRIN) 200 MG tablet     insulin detemir (LEVEMIR  "FLEXTOUCH) 100 UNIT/ML pen     lancets (ONETOUCH DELICA LANCETS) 33 gauge Misc     Semaglutide, 1 MG/DOSE, (OZEMPIC, 1 MG/DOSE,) 4 MG/3ML SOPN     traMADol (ULTRAM) 50 MG tablet     No current facility-administered medications for this visit.        Allergies   Allergen Reactions     Alprazolam Other (See Comments)     Irritable, no memory of what happened     Clonazepam Dizziness     No memory of what happened while on medication         Review of Systems   Constitutional, HEENT, cardiovascular, pulmonary, gi and gu systems are negative, except as otherwise noted.      Objective    /88 (BP Location: Right arm, Patient Position: Sitting, Cuff Size: Adult Large)   Pulse 102   Temp 97  F (36.1  C) (Tympanic)   Resp 16   Ht 1.753 m (5' 9\")   Wt 142.5 kg (314 lb 1.6 oz)   LMP 08/17/2022   SpO2 100%   BMI 46.38 kg/m    Body mass index is 46.38 kg/m .  Physical Exam   GENERAL: healthy, alert and no distress  RESP: lungs clear to auscultation - no rales, rhonchi or wheezes  CV: regular rate and rhythm, normal S1 S2, no S3 or S4, no murmur, click or rub, no peripheral edema and peripheral pulses strong  ABDOMEN: soft, nontender, no hepatosplenomegaly, no masses and bowel sounds normal  MS: no gross musculoskeletal defects noted, no edema  PSYCH: mentation appears normal, affect normal/bright    Diabetic foot exam: normal DP and PT pulses, no trophic changes or ulcerative lesions, normal sensory exam and normal monofilament exam                  "

## 2022-10-04 NOTE — TELEPHONE ENCOUNTER
Patient sent [employer] message requesting that phentermine prescription be sent to [facility], she will pay for it out of pocket.     Ellen Romero RN on 10/4/2022 at 1:11 PM

## 2022-10-04 NOTE — TELEPHONE ENCOUNTER
Alternative pharmacy requested.     Routing refill request to provider for review/approval because:  Drug not on the [facility] refill protocol     Laci MARES RN

## 2022-10-04 NOTE — TELEPHONE ENCOUNTER
Central Prior Authorization Team   Phone: [phone]      Per [employer] automated system, Case ID [id number] is pending review and is currently being processed by the insurance.  Per the insurance PA rep that I spoke to from [employer], the insurance deadline for the determination of the request is 10/15/22; the review could come back sooner.    Once determination is received, the medication will be released from the waiting on payor response and sent to the pharmacy.

## 2022-10-05 ENCOUNTER — TELEPHONE (OUTPATIENT)
Dept: PEDIATRICS | Facility: CLINIC | Age: 42
End: 2022-10-05

## 2022-10-05 RX ORDER — PHENTERMINE HYDROCHLORIDE 37.5 MG/1
37.5 CAPSULE ORAL EVERY MORNING
Qty: 30 CAPSULE | Refills: 0 | Status: SHIPPED | OUTPATIENT
Start: 2022-10-05 | End: 2022-10-28

## 2022-10-05 NOTE — TELEPHONE ENCOUNTER
Central Prior Authorization Team   Phone: [phone]      PRIOR AUTHORIZATION DENIED    Medication: phentermine (ADIPEX-P) 37.5 MG capsule - EPA DENIED     Denial Date: 10/5/2022    Denial Rational:         Appeal Information:

## 2022-10-28 ENCOUNTER — OFFICE VISIT (OUTPATIENT)
Dept: PEDIATRICS | Facility: CLINIC | Age: 42
End: 2022-10-28
Payer: COMMERCIAL

## 2022-10-28 VITALS
DIASTOLIC BLOOD PRESSURE: 72 MMHG | HEART RATE: 88 BPM | WEIGHT: 293 LBS | SYSTOLIC BLOOD PRESSURE: 110 MMHG | TEMPERATURE: 97.1 F | OXYGEN SATURATION: 98 % | BODY MASS INDEX: 45.93 KG/M2 | RESPIRATION RATE: 16 BRPM

## 2022-10-28 DIAGNOSIS — E66.01 MORBID OBESITY (H): ICD-10-CM

## 2022-10-28 PROCEDURE — 99213 OFFICE O/P EST LOW 20 MIN: CPT | Performed by: NURSE PRACTITIONER

## 2022-10-28 RX ORDER — PHENTERMINE HYDROCHLORIDE 37.5 MG/1
37.5 CAPSULE ORAL EVERY MORNING
Qty: 30 CAPSULE | Refills: 2 | Status: SHIPPED | OUTPATIENT
Start: 2022-10-28 | End: 2023-02-01

## 2022-10-28 ASSESSMENT — PAIN SCALES - GENERAL: PAINLEVEL: NO PAIN (0)

## 2022-10-28 NOTE — PROGRESS NOTES
Assessment & Plan     Morbid obesity (H)  Has been taking phentermine for two months with a 9 pound weight loss. Currently taking 37.5 mg daily and tolerating this well without side effects. Plan to continue on current dose with follow-up in clinic in 3 months.   - phentermine (ADIPEX-P) 37.5 MG capsule; Take 1 capsule (37.5 mg) by mouth every morning      15 minutes spent on the date of the encounter doing chart review, patient visit and documentation        MEDICATIONS:  Continue current medications without change  FUTURE APPOINTMENTS:       - Follow-up visit in 3 months.    Return in about 3 months (around 1/28/2023) for Follow-up, In-person Visit.    CHUY Monge [facility] JESSICA Arechiga is a 41 year old, presenting for the following health issues:  Follow Up    Presents for follow-up.     Has been taking phentermine for two months with a 9 pound weight loss. Currently taking 37.5 mg daily and tolerating this well without side effects. Notes her clothes have started to fit differently, which she is pleased with. Would like to continue on her current dose of phentermine for now.     Wt Readings from Last 10 Encounters:   10/28/22 141.1 kg (311 lb)   09/30/22 142.5 kg (314 lb 1.6 oz)   08/31/22 145.5 kg (320 lb 12.8 oz)   06/20/22 142.9 kg (315 lb)   06/17/22 143.8 kg (317 lb)   03/16/22 143.9 kg (317 lb 5 oz)   01/10/22 148.5 kg (327 lb 7 oz)   08/03/21 145.2 kg (320 lb)   06/17/21 (!) 150.5 kg (331 lb 14.4 oz)   04/19/21 148.8 kg (328 lb)       The 10-year ASCVD risk score (Alejandra SWENSON, et al., 2019) is: 0.9%    Values used to calculate the score:      Age: 41 years      Sex: Female      Is Non- : No      Diabetic: Yes      Tobacco smoker: No      Systolic Blood Pressure: 110 mmHg      Is BP treated: No      HDL Cholesterol: 41 mg/dL      Total Cholesterol: 157 mg/dL    Patient Active Problem List   Diagnosis     Migraine without aura and  without status migrainosus, not intractable     AMANDA (generalized anxiety disorder)     PCOS (polycystic ovarian syndrome)     Mild recurrent major depression (H)     Type 2 diabetes mellitus without complication, without long-term current use of insulin (H)     Mild intermittent asthma without complication     Morbid obesity (H)     Hepatic steatosis     Past Medical History:   Diagnosis Date     Anxiety      Asthma      Depression      Diabetes mellitus, type 2 (H)      PCOS (polycystic ovarian syndrome)      Past Surgical History:   Procedure Laterality Date     CHOLECYSTECTOMY       FOOT FRACTURE SURGERY Left     x3     FOOT FRACTURE SURGERY Left 2011     Family History   Problem Relation Age of Onset     Diabetes Type 2  Mother      Depression Mother      Arthritis Mother      Cerebrovascular Disease Mother 60.00     Diabetes Type 2  Father      Depression Father      Early Death Sister         rare blood disorder     Lung Cancer Maternal Grandmother      Cerebrovascular Disease Maternal Grandmother      Throat cancer Maternal Grandfather      Diabetes Maternal Grandfather      Prostate Cancer Paternal Grandfather      Depression Sister      Asthma Sister      Liver Disease Sister         Primary biliary cholangitis      Social History     Socioeconomic History     Marital status:      Spouse name: Not on file     Number of children: Not on file     Years of education: Not on file     Highest education level: Not on file   Occupational History     Not on file   Tobacco Use     Smoking status: Never     Smokeless tobacco: Never   Vaping Use     Vaping Use: Never used   Substance and Sexual Activity     Alcohol use: Yes     Comment: twice per month      Drug use: Never     Sexual activity: Yes     Partners: Male   Other Topics Concern     Parent/sibling w/ CABG, MI or angioplasty before 65F 55M? Not Asked   Social History Narrative     Not on file     Social Determinants of Health     Financial Resource  Strain: Not on file   Food Insecurity: Not on file   Transportation Needs: Not on file   Physical Activity: Not on file   Stress: Not on file   Social Connections: Not on file   Intimate Partner Violence: Not on file   Housing Stability: Not on file     Current Outpatient Medications   Medication     phentermine (ADIPEX-P) 37.5 MG capsule     albuterol (PROAIR HFA/PROVENTIL HFA/VENTOLIN HFA) 108 (90 Base) MCG/ACT inhaler     BD DONNELL U/F 32G X 4 MM insulin pen needle     blood glucose test strips     cetirizine HCl (ZYRTEC ORAL)     cholecalciferol, vitamin D3, (VITAMIN D3 ORAL)     Continuous Blood Gluc Sensor (FREESTYLE DOROTHY 2 SENSOR) MISC     escitalopram (LEXAPRO) 10 MG tablet     escitalopram (LEXAPRO) 20 MG tablet     ibuprofen (ADVIL/MOTRIN) 200 MG tablet     insulin detemir (LEVEMIR FLEXTOUCH) 100 UNIT/ML pen     metFORMIN (GLUCOPHAGE XR) 500 MG 24 hr tablet     Semaglutide, 1 MG/DOSE, (OZEMPIC, 1 MG/DOSE,) 4 MG/3ML SOPN     traMADol (ULTRAM) 50 MG tablet     No current facility-administered medications for this visit.        Allergies   Allergen Reactions     Alprazolam Other (See Comments)     Irritable, no memory of what happened     Clonazepam Dizziness     No memory of what happened while on medication         Review of Systems    ROS: 10 point ROS neg other than the symptoms noted above in the HPI.        Objective    /72   Pulse 88   Temp 97.1  F (36.2  C) (Tympanic)   Resp 16   Wt 141.1 kg (311 lb)   LMP 10/23/2022   SpO2 98%   BMI 45.93 kg/m    Body mass index is 45.93 kg/m .  Physical Exam  Constitutional:       General: She is not in acute distress.     Appearance: Normal appearance. She is not ill-appearing or toxic-appearing.   Cardiovascular:      Rate and Rhythm: Normal rate and regular rhythm.      Heart sounds: Normal heart sounds. No murmur heard.    No friction rub. No gallop.   Pulmonary:      Effort: Pulmonary effort is normal. No respiratory distress.      Breath sounds:  Normal breath sounds. No wheezing, rhonchi or rales.   Skin:     General: Skin is warm and dry.   Neurological:      General: No focal deficit present.      Mental Status: She is alert and oriented to person, place, and time.   Psychiatric:         Mood and Affect: Mood normal.         Behavior: Behavior normal.

## 2022-10-28 NOTE — PATIENT INSTRUCTIONS
Pharmacy may dispense brand covered by insurance (Proair, or proventil or ventolin or generic albuterol inhaler).

## 2022-11-01 DIAGNOSIS — E11.9 TYPE 2 DIABETES MELLITUS WITHOUT COMPLICATION, WITHOUT LONG-TERM CURRENT USE OF INSULIN (H): ICD-10-CM

## 2022-11-01 NOTE — TELEPHONE ENCOUNTER
Routing refill request to provider for review/approval because:  Drug not on the [facility] refill protocol     Last Written Prescription Date:  22  Last Fill Quantity: 2,  # refills: 5   Last office visit provider:  10/28/22     Requested Prescriptions   Pending Prescriptions Disp Refills     Continuous Blood Gluc Sensor (FREESTYLE DOROTHY 2 SENSOR) MISC 2 each 5     Si each every 14 days 1 each every 14 days. Change every 14 days.       There is no refill protocol information for this order          Ellen Kaur, RN 22 1:54 PM

## 2023-01-23 DIAGNOSIS — E11.9 TYPE 2 DIABETES MELLITUS WITHOUT COMPLICATION, WITHOUT LONG-TERM CURRENT USE OF INSULIN (H): ICD-10-CM

## 2023-01-24 RX ORDER — SEMAGLUTIDE 1.34 MG/ML
INJECTION, SOLUTION SUBCUTANEOUS
Qty: 9 ML | Refills: 3 | Status: SHIPPED | OUTPATIENT
Start: 2023-01-24 | End: 2023-04-05 | Stop reason: ALTCHOICE

## 2023-01-24 NOTE — TELEPHONE ENCOUNTER
"Routing refill request to provider for review/approval because:  Patient needs to be seen because:  q6m    Last Written Prescription Date:  2/21/22  Last Fill Quantity: 9,  # refills: 3   Last office visit provider:  10/28/22     Requested Prescriptions   Pending Prescriptions Disp Refills     OZEMPIC (1 MG/DOSE) 4 MG/3ML pen [Pharmacy Med Name: OZEMPIC PEN (1MG/DOSE) 3ML 4MG/3ML] 9 mL 3     Sig: INJECT 1 MG UNDER THE SKIN ONCE A WEEK       GLP-1 Agonists Protocol Failed - 1/23/2023 11:41 PM        Failed - Recent (6 mo) or future (30 days) visit within the authorizing provider's specialty     Patient had office visit in the last 6 months or has a visit in the next 30 days with authorizing provider.  See \"Patient Info\" tab in inbasket, or \"Choose Columns\" in Meds & Orders section of the refill encounter.            Passed - HgbA1C in past 3 or 6 months     If HgbA1C is 8 or greater, it needs to be on file within the past 3 months.  If less than 8, must be on file within the past 6 months.     Recent Labs   Lab Test 09/30/22  1000   A1C 6.5*             Passed - Medication is active on med list        Passed - Patient is age 18 or older        Passed - No active pregnancy on record        Passed - Normal serum creatinine on file in past 12 months     Recent Labs   Lab Test 04/11/22  0854   CR 0.70       Ok to refill medication if creatinine is low          Passed - No positive pregnancy test in past 12 months             Ellen Kaur RN 01/24/23 3:33 PM  "

## 2023-02-01 ENCOUNTER — OFFICE VISIT (OUTPATIENT)
Dept: PEDIATRICS | Facility: CLINIC | Age: 43
End: 2023-02-01
Payer: COMMERCIAL

## 2023-02-01 VITALS
DIASTOLIC BLOOD PRESSURE: 72 MMHG | OXYGEN SATURATION: 98 % | HEART RATE: 78 BPM | RESPIRATION RATE: 16 BRPM | SYSTOLIC BLOOD PRESSURE: 128 MMHG | BODY MASS INDEX: 44.41 KG/M2 | HEIGHT: 68 IN | WEIGHT: 293 LBS | TEMPERATURE: 98.3 F

## 2023-02-01 DIAGNOSIS — E66.01 MORBID OBESITY (H): ICD-10-CM

## 2023-02-01 DIAGNOSIS — E11.9 TYPE 2 DIABETES MELLITUS WITHOUT COMPLICATION, WITHOUT LONG-TERM CURRENT USE OF INSULIN (H): Primary | ICD-10-CM

## 2023-02-01 DIAGNOSIS — M54.50 ACUTE BILATERAL LOW BACK PAIN WITHOUT SCIATICA: ICD-10-CM

## 2023-02-01 DIAGNOSIS — F33.0 MILD RECURRENT MAJOR DEPRESSION (H): ICD-10-CM

## 2023-02-01 DIAGNOSIS — G43.009 MIGRAINE WITHOUT AURA AND WITHOUT STATUS MIGRAINOSUS, NOT INTRACTABLE: ICD-10-CM

## 2023-02-01 LAB
CHOLEST SERPL-MCNC: 202 MG/DL
CREAT UR-MCNC: 228 MG/DL
HBA1C MFR BLD: 6.5 % (ref 0–5.6)
HDLC SERPL-MCNC: 42 MG/DL
LDLC SERPL CALC-MCNC: 114 MG/DL
MICROALBUMIN UR-MCNC: 56.8 MG/L
MICROALBUMIN/CREAT UR: 24.91 MG/G CR (ref 0–25)
NONHDLC SERPL-MCNC: 160 MG/DL
TRIGL SERPL-MCNC: 232 MG/DL

## 2023-02-01 PROCEDURE — 36415 COLL VENOUS BLD VENIPUNCTURE: CPT | Performed by: NURSE PRACTITIONER

## 2023-02-01 PROCEDURE — 82570 ASSAY OF URINE CREATININE: CPT | Performed by: NURSE PRACTITIONER

## 2023-02-01 PROCEDURE — 82043 UR ALBUMIN QUANTITATIVE: CPT | Performed by: NURSE PRACTITIONER

## 2023-02-01 PROCEDURE — 99214 OFFICE O/P EST MOD 30 MIN: CPT | Performed by: NURSE PRACTITIONER

## 2023-02-01 PROCEDURE — 83036 HEMOGLOBIN GLYCOSYLATED A1C: CPT | Performed by: NURSE PRACTITIONER

## 2023-02-01 PROCEDURE — 80061 LIPID PANEL: CPT | Performed by: NURSE PRACTITIONER

## 2023-02-01 RX ORDER — TRAMADOL HYDROCHLORIDE 50 MG/1
50 TABLET ORAL EVERY 6 HOURS PRN
Qty: 30 TABLET | Refills: 0 | Status: SHIPPED | OUTPATIENT
Start: 2023-02-01 | End: 2023-08-23

## 2023-02-01 RX ORDER — PHENTERMINE HYDROCHLORIDE 37.5 MG/1
37.5 CAPSULE ORAL EVERY MORNING
Qty: 30 CAPSULE | Refills: 2 | Status: SHIPPED | OUTPATIENT
Start: 2023-02-01 | End: 2023-04-21

## 2023-02-01 ASSESSMENT — PAIN SCALES - GENERAL: PAINLEVEL: NO PAIN (0)

## 2023-02-01 ASSESSMENT — PATIENT HEALTH QUESTIONNAIRE - PHQ9
10. IF YOU CHECKED OFF ANY PROBLEMS, HOW DIFFICULT HAVE THESE PROBLEMS MADE IT FOR YOU TO DO YOUR WORK, TAKE CARE OF THINGS AT HOME, OR GET ALONG WITH OTHER PEOPLE: NOT DIFFICULT AT ALL
SUM OF ALL RESPONSES TO PHQ QUESTIONS 1-9: 4
SUM OF ALL RESPONSES TO PHQ QUESTIONS 1-9: 4

## 2023-02-01 ASSESSMENT — ASTHMA QUESTIONNAIRES: ACT_TOTALSCORE: 24

## 2023-02-01 NOTE — PROGRESS NOTES
Assessment & Plan     Type 2 diabetes mellitus without complication, without long-term current use of insulin (H)  A1C is stable at 6.5%. Taking metformin XR 2000 mg daily, ozempic 1 mg weekly, levimir 30 units at bedtime. Will await FLP to return, consider addition of moderate intensity statin at that time.   - HEMOGLOBIN A1C  - Lipid panel reflex to direct LDL Fasting    Morbid obesity (H)  Body mass index is 45.61 kg/m . Pt has lost 20 pounds since starting phentermine 5 months ago. Taking as prescribed without adverse side effects. Reports weight loss has been steady. Congratulated in her success. Plan to continue on phentermine at current dose with follow-up in person in 3 months. PDMP reviewed.  - phentermine (ADIPEX-P) 37.5 MG capsule; Take 1 capsule (37.5 mg) by mouth every morning  - Lipid panel reflex to direct LDL Fasting    Migraine without aura and without status migrainosus, not intractable  Stable. Uses tramadol very sparingly. PDMP reviewed. Refilled.   - traMADol (ULTRAM) 50 MG tablet; Take 1 tablet (50 mg) by mouth every 6 hours as needed (migraine)    Acute bilateral low back pain without sciatica  Symptoms x 2 days. Better today. Planning to pursue PT through her employer. Will reach out as needed if wishing to pursue PT through [place].     Mild recurrent major depression (H)  Stable on current medication regimen.      20 minutes spent on the date of the encounter doing chart review, patient visit and documentation        MEDICATIONS:  Continue current medications without change  FUTURE APPOINTMENTS:       - Follow-up visit in 3 months.     Follow-up: Lab results pending, will follow-up as indicated after reviewing results.     Return in about 3 months (around 5/1/2023) for Follow-up, In-person Visit.    CHUY Monge CNP  [facility] JESSICA Arcehiga is a 42 year old, presenting for the following health issues:  Diabetes      HPI     Presents for medication  follow-up.     Started phentermine 5 months ago. Taking 37.5 mg as prescribed without side effects. Has lost 20 pounds. More conscious about what she is eating. Admits she likes carbs but has been trying to really limit carbs, no added sugar.     Wt Readings from Last 10 Encounters:   02/01/23 136.1 kg (300 lb)   10/28/22 141.1 kg (311 lb)   09/30/22 142.5 kg (314 lb 1.6 oz)   08/31/22 145.5 kg (320 lb 12.8 oz)   06/20/22 142.9 kg (315 lb)   06/17/22 143.8 kg (317 lb)   03/16/22 143.9 kg (317 lb 5 oz)   01/10/22 148.5 kg (327 lb 7 oz)   08/03/21 145.2 kg (320 lb)   06/17/21 (!) 150.5 kg (331 lb 14.4 oz)     Diabetes Follow-up  Taking metformin XR 2000 mg daily, ozempic 1 mg weekly, levimir 30 units at bedtime.   How often are you checking your blood sugar? Continuous glucose monitor  What time of day are you checking your blood sugars (select all that apply)?  Before and after meals  Have you had any blood sugars above 200?  yes  Have you had any blood sugars below 70?  No    What symptoms do you notice when your blood sugar is low?  None    What concerns do you have today about your diabetes? None     Do you have any of these symptoms? (Select all that apply)  No numbness or tingling in feet.  No redness, sores or blisters on feet.  No complaints of excessive thirst.  No reports of blurry vision.  No significant changes to weight.      BP Readings from Last 2 Encounters:   02/01/23 128/72   10/28/22 110/72     Hemoglobin A1C (%)   Date Value   09/30/2022 6.5 (H)   04/11/2022 6.1 (H)     LDL Cholesterol Calculated (mg/dL)   Date Value   09/30/2022 81   01/20/2020 108     SI joint pain for a couple of days now. Feeling much better today.   Pain is worse when moving from sitting to standing or when sitting straight up in a chair. 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             Patient Active Problem List   Diagnosis     Migraine without aura and without status migrainosus, not intractable      "AMANDA (generalized anxiety disorder)     PCOS (polycystic ovarian syndrome)     Mild recurrent major depression (H)     Type 2 diabetes mellitus without complication, without long-term current use of insulin (H)     Mild intermittent asthma without complication     Morbid obesity (H)     Hepatic steatosis     Past Medical History:   Diagnosis Date     Anxiety      Asthma      Depression      Diabetes mellitus, type 2 (H)      PCOS (polycystic ovarian syndrome)      Current Outpatient Medications   Medication     albuterol (PROAIR HFA/PROVENTIL HFA/VENTOLIN HFA) 108 (90 Base) MCG/ACT inhaler     BD DONNELL U/F 32G X 4 MM insulin pen needle     cetirizine HCl (ZYRTEC ORAL)     cholecalciferol, vitamin D3, (VITAMIN D3 ORAL)     Continuous Blood Gluc Sensor (FREESTYLE DOROTHY 2 SENSOR) MISC     escitalopram (LEXAPRO) 10 MG tablet     escitalopram (LEXAPRO) 20 MG tablet     ibuprofen (ADVIL/MOTRIN) 200 MG tablet     insulin detemir (LEVEMIR FLEXTOUCH) 100 UNIT/ML pen     metFORMIN (GLUCOPHAGE XR) 500 MG 24 hr tablet     OZEMPIC, 1 MG/DOSE, 4 MG/3ML pen     phentermine (ADIPEX-P) 37.5 MG capsule     traMADol (ULTRAM) 50 MG tablet     blood glucose test strips     No current facility-administered medications for this visit.        Allergies   Allergen Reactions     Alprazolam Other (See Comments)     Irritable, no memory of what happened     Clonazepam Dizziness     No memory of what happened while on medication         Review of Systems    ROS: 10 point ROS neg other than the symptoms noted above in the HPI.        Objective    /72   Pulse 78   Temp 98.3  F (36.8  C) (Tympanic)   Resp 16   Ht 1.727 m (5' 8\")   Wt 136.1 kg (300 lb)   LMP 01/25/2023   SpO2 98%   BMI 45.61 kg/m    Body mass index is 45.61 kg/m .  Physical Exam  Constitutional:       General: She is not in acute distress.     Appearance: Normal appearance. She is not ill-appearing or toxic-appearing.   Cardiovascular:      Rate and Rhythm: Normal rate " and regular rhythm.      Heart sounds: Normal heart sounds. No murmur heard.    No friction rub. No gallop.   Pulmonary:      Effort: Pulmonary effort is normal. No respiratory distress.   Skin:     General: Skin is warm and dry.   Neurological:      General: No focal deficit present.      Mental Status: She is alert and oriented to person, place, and time.   Psychiatric:         Mood and Affect: Mood normal.         Behavior: Behavior normal.

## 2023-02-02 DIAGNOSIS — E11.9 TYPE 2 DIABETES MELLITUS WITHOUT COMPLICATION, WITHOUT LONG-TERM CURRENT USE OF INSULIN (H): Primary | ICD-10-CM

## 2023-02-02 RX ORDER — ATORVASTATIN CALCIUM 20 MG/1
20 TABLET, FILM COATED ORAL DAILY
Qty: 90 TABLET | Refills: 0 | Status: SHIPPED | OUTPATIENT
Start: 2023-02-02 | End: 2023-04-21

## 2023-02-08 ENCOUNTER — TRANSFERRED RECORDS (OUTPATIENT)
Dept: HEALTH INFORMATION MANAGEMENT | Facility: CLINIC | Age: 43
End: 2023-02-08

## 2023-02-28 DIAGNOSIS — E11.9 TYPE 2 DIABETES MELLITUS WITHOUT COMPLICATION, WITHOUT LONG-TERM CURRENT USE OF INSULIN (H): ICD-10-CM

## 2023-03-01 RX ORDER — ESCITALOPRAM OXALATE 10 MG/1
TABLET ORAL
Qty: 90 TABLET | Refills: 3 | OUTPATIENT
Start: 2023-03-01

## 2023-03-09 ENCOUNTER — MYC MEDICAL ADVICE (OUTPATIENT)
Dept: PEDIATRICS | Facility: CLINIC | Age: 43
End: 2023-03-09
Payer: COMMERCIAL

## 2023-03-09 NOTE — TELEPHONE ENCOUNTER
Please see patient MyChart message and advise if e-visit needed.     Laci MARES RN 3/9/2023 at 12:56 PM

## 2023-03-10 ENCOUNTER — TRANSFERRED RECORDS (OUTPATIENT)
Dept: HEALTH INFORMATION MANAGEMENT | Facility: CLINIC | Age: 43
End: 2023-03-10

## 2023-03-10 ENCOUNTER — TELEPHONE (OUTPATIENT)
Dept: PEDIATRICS | Facility: CLINIC | Age: 43
End: 2023-03-10
Payer: COMMERCIAL

## 2023-03-13 NOTE — TELEPHONE ENCOUNTER
Forms reviewed and signed. Placed in station B out basket for return.     Ira Marcus, NILAY, APRN, CNP

## 2023-03-18 DIAGNOSIS — E11.9 TYPE 2 DIABETES MELLITUS WITHOUT COMPLICATION, WITHOUT LONG-TERM CURRENT USE OF INSULIN (H): ICD-10-CM

## 2023-03-19 NOTE — TELEPHONE ENCOUNTER
Should have refills on file. ordered on 11/1/22 for 2/5     Radha Olmedo RN on 3/19/2023 at 2:38 AM

## 2023-03-21 RX ORDER — METFORMIN HCL 500 MG
2000 TABLET, EXTENDED RELEASE 24 HR ORAL
Qty: 360 TABLET | Refills: 1 | Status: SHIPPED | OUTPATIENT
Start: 2023-03-21 | End: 2023-11-16

## 2023-03-21 NOTE — TELEPHONE ENCOUNTER
Mail order pharmacy.  Prescription approved per [facility] Refill Protocol.  Betsey Knapp RN

## 2023-03-31 DIAGNOSIS — E11.9 TYPE 2 DIABETES MELLITUS WITHOUT COMPLICATION, WITHOUT LONG-TERM CURRENT USE OF INSULIN (H): ICD-10-CM

## 2023-03-31 RX ORDER — INSULIN DETEMIR 100 [IU]/ML
INJECTION, SOLUTION SUBCUTANEOUS
Qty: 45 ML | Refills: 3 | Status: SHIPPED | OUTPATIENT
Start: 2023-03-31 | End: 2023-04-05

## 2023-03-31 NOTE — TELEPHONE ENCOUNTER
"    Last Written Prescription Date:  2/14/2022  Last Fill Quantity: 45ml,  # refills: 1   Last office visit provider:  2/1/2023     Requested Prescriptions   Pending Prescriptions Disp Refills     LEVEMIR FLEXTOUCH 100 UNIT/ML pen [Pharmacy Med Name: LEVEMIR FLEXTOUCH PEN 3ML 5'S 100U/ML] 45 mL 3     Sig: INJECT 30 UNITS UNDER THE SKIN AT BEDTIME       Long Acting Insulin Protocol Failed - 3/31/2023 12:39 PM        Failed - Recent (6 mo) or future (30 days) visit within the authorizing provider's specialty     Patient had office visit in the last 6 months or has a visit in the next 30 days with authorizing provider or within the authorizing provider's specialty.  See \"Patient Info\" tab in inbasket, or \"Choose Columns\" in Meds & Orders section of the refill encounter.            Passed - Serum creatinine on file in past 12 months     Recent Labs   Lab Test 04/11/22  0854   CR 0.70       Ok to refill medication if creatinine is low          Passed - HgbA1C in past 3 or 6 months     If HgbA1C is 8 or greater, it needs to be on file within the past 3 months.  If less than 8, must be on file within the past 6 months.     Recent Labs   Lab Test 02/01/23  1310   A1C 6.5*             Passed - Medication is active on med list        Passed - Patient is age 18 or older             Tuyet Pike RN 03/31/23 5:43 PM  "

## 2023-04-04 NOTE — PROGRESS NOTES
Medication Therapy Management (MTM) Encounter    ASSESSMENT:                            Medication Adherence/Access: would benefit from Mounjaro savings card, enrolled at today's visit.     Type 2 Diabetes: Patient is meeting A1c goal of < 7%. Would benefit from initiation of Freestyle asad 3 sensors for improved scanning, ordered today. Patient preference to switch to Mounjaro due to lack of weight loss and A1C benefits with Ozempic. Recommend stopping Ozempic and initiation of Mounjaro for added A1C lowering & weight loss benefits.  Reviewed administration, potential side effects, and storage of Mounjaro. Will continue to titrate Mounjaro dose at future visits if tolerating. Future considerations include reducing Levemir dose as Mounjaro is titrated. Due for fasting lipid panel with next labs.     Weight Management: Needs improvement. Recommend switching to Mounjaro, see diabetes section above. Future considerations include further review of need for continuation of phentermine.     Depression/Anxiety: Patient endorses worsened depression and concentration over last couple months, and would like to consider adjustments to her treatment. Discussed addition of Bupropion for depressive symptoms, reviewed this medication could worsen anxiety. Per UpToDate, Wellbutrin, phentermine, and Lexapro together could lower seizure threshold, reviewed this risk with patient today. Patient endorses no personal or familial history of seizures, and prefers to start treatment at this time. MTM received verbal order authorization from provider. Would benefit from closely monitoring blood pressure & reassessing safety/efficacy at follow-up with provider in 1 month.     Migraine without aura: Stable.     PLAN:                              1. Start Mounjaro 2.5 mg subcutaneous once weekly injection for 4 weeks, then increase to 5 mg injection once weekly if tolerating.     Reassess efficacy and tolerability at next visit with provider  Ira Marcus.     Provided with Mounjaro Savings Card (See AVS)   2. Discontinue Ozempic when starting Mounjaro.   3. Start FreeStyle Libre3 sensors.   4. May consider trying Skin-Tac or 'adhesive wipes' to help keep the Libre3 Sensor on.   5. Consider monitoring home blood pressure a couple times each week leading up to your appointment with Ira Marcus.   6. Initiate Wellbutrin  mg by mouth daily.     Reassess efficacy with provider Ira Marcus at next office visit.     Reach out to Jayleen or Ira Marcus if she has any side effects or concerns with mood      Follow-up: Return in about 9 weeks (around 6/7/2023) for Follow up, with me, using a phone visit.     Future considerations:   - Titrate Mounjaro   - Consider dose reduction of Levemir    SUBJECTIVE/OBJECTIVE:                          Ambar Jin is a 42 year old female coming in for an initial visit. She was referred to me from Ira Marcus about starting Mounjaro.      Reason for visit: Starting Mounjaro for weightloss and diabetes management. She would also like to switch to FreeStyle Libre3.    Allergies/ADRs: Reviewed in chart  Past Medical History: Reviewed in chart  Tobacco: She reports that she has never smoked. She has never used smokeless tobacco.  Alcohol: Reviewed in chart    Medication Adherence/Access: no issues reported  The patient fills medications at [facility] in [place].    Type 2 Diabetes/Obesity:  Currently taking Metformin XR 2000 mg daily with dinner, Levimir 20 units at bedtime, Ozempic 1 MG once weekly. Patient is not experiencing side effects.  Reports self-reducing dose of Levemir from 30 units due to overnight low.     Blood sugar monitoring: Continuous Glucose Monitor Jasbir 2. Limited readings available, reports sensor was having connection difficulties with phone. Would like to try Jasbir 3 to reduce required scanning.  Ranges (per continuous glucose monitor):       Symptoms of low blood sugar? One  recent episode ~2 weeks ago, B. Self-reduced dose of Levemir from 30 units daily due to this. Reports alarms went off on CGM.   Symptoms of high blood sugar? None  Eye exam: due  Foot exam: up to date  Diet/Exercise: Patient has been snacking and wanting to eat more sweets lately. She states her work area tends to have a lot of unhealthy foods to eat.  Aspirin: Not taking due to age  Statin: Yes: Atorvastatin 20 mg daily, started ~2 months ago. No reported side effects  ACEi/ARB: No    Urine Albumin:   Lab Results   Component Value Date    UMALCR 24.91 2023      Lab Results   Component Value Date    A1C 6.5 2023    A1C 6.5 2022    A1C 6.1 2022    A1C 10.6 01/10/2022    A1C 8.9 2021      Creatinine   Date Value Ref Range Status   2022 0.70 0.60 - 1.10 mg/dL Final     GFR Estimate   Date Value Ref Range Status   2022 >90 >60 mL/min/1.73m2 Final     Comment:     Effective 2021 eGFRcr in adults is calculated using the  CKD-EPI creatinine equation which includes age and gender (Katerine et al., NEJ, DOI: 10.1056/VQXZnt9200491)     Recent Labs   Lab Test 23  1310 22  1000   CHOL 202* 157   HDL 42* 41*   * 81   TRIG 232* 177*     Weight Loss: Currently taking Phentermine 37.5 mg every morning and Ozempic 1 mg subcutaneously once weekly. No side effects noted. She does not feel Ozempic has impacted/reduced her appetite and does not think it has impacted weight loss or A1C lowering.     2023 Office Visit with Dr. Marcus: Patient has lost 20 pounds since starting phentermine 5 months ago. Reported weight loss as steady. Planned to continue phentermine at current dose with follow-up in 3 months.    Wt Readings from Last 5 Encounters:   23 296 lb (134.3 kg)   23 300 lb (136.1 kg)   10/28/22 311 lb (141.1 kg)   22 314 lb 1.6 oz (142.5 kg)   22 320 lb 12.8 oz (145.5 kg)     Estimated body mass index is 45.01 kg/m  as  "calculated from the following:    Height as of 2/1/23: 5' 8\" (1.727 m).    Weight as of this encounter: 296 lb (134.3 kg).    Notes having coffee immediately before visit.   BP Readings from Last 3 Encounters:   04/05/23 128/80   02/01/23 128/72   10/28/22 110/72     Pulse Readings from Last 3 Encounters:   02/01/23 78   10/28/22 88   09/30/22 102     Depression/Anxiety:  Current medications include: Escitalopram 30 mg once daily. Side effects: none.   The patient feels like her anxiety has been controlled, has been experiencing some lows in mood and depressive symptoms. Reports her motivation is gone, this has been constant. She also feels like PHQ-9/mood has worsened since last checked in Feb 2023.     Previously, has tried Bupropion, Zoloft, Celexa, Wellbutrin. Escitalopram has been the best option so far. She felt she hadn't given Bupropion a chance since she last tried the medication during the COVID pandemic, would like to restart this today. Patient reports no personal or family history of seizures.        6/19/2022     6:57 PM 8/31/2022    10:44 AM 2/1/2023    12:35 PM   PHQ-9 SCORE   PHQ-9 Total Score MyChart 9 (Mild depression) 5 (Mild depression) 4 (Minimal depression)   PHQ-9 Total Score 9 5 4         7/2/2020     3:00 PM 6/17/2021     9:00 AM 1/10/2022    12:58 PM   AMANDA-7 SCORE   Total Score 11 16 2     Migraine without aura: Current medications include Tramadol 50 mg as needed for acute migraine. Patient reports rarely having migraines, less than 1 per month. No side effects reported. Per patient she is unable to take triptan for migraines & tramadol helps manage acute migraines.     Today's Vitals: /80   Wt 296 lb (134.3 kg)   BMI 45.01 kg/m    ----------------    I spent 50 minutes with this patient today. All changes were made via collaborative practice agreement with CHUY Monge CNP. A copy of the visit note was provided to the patient's provider(s).    A summary of these " recommendations was given to the patient.    Jayleen Ha, PharmD  Medication Therapy Management Resident  Pager: [phone]    Delphine Benito  Pharm.D. Student     Preceptor cosignature: Patient was seen independently by Dr. Ha. I have reviewed the assessment and plan. Annamaria Preciado, PharmD, [facility]       Medication Therapy Recommendations  Mild recurrent major depression (H)    Rationale: Synergistic therapy - Needs additional medication therapy - Indication   Recommendation: Start Medication - buPROPion 150 MG 24 hr tablet - Start Bupropion  mg daily.   Status: Accepted per Provider         Type 2 diabetes mellitus without complication, without long-term current use of insulin (H)    Current Medication: Continuous Blood Gluc Sensor (FREESTYLE JASBIR 2 SENSOR) MISC   Rationale: More effective medication available - Ineffective medication - Effectiveness   Recommendation: Change Medication - FreeStyle Jasbir 3 Sensor Misc - Switch to Freestyle jasbir 3 sensor for improved adherence.   Status: Accepted per Provider          Current Medication: OZEMPIC, 1 MG/DOSE, 4 MG/3ML pen (Discontinued)   Rationale: More effective medication available - Ineffective medication - Effectiveness   Recommendation: Change Medication - Mounjaro 2.5 MG/0.5ML Sopn - Stop Ozempic. Start Mounjaro 2.5 mg weekly for 4 weeks, then increase to 5 mg weekly.   Status: Accepted per Provider

## 2023-04-05 ENCOUNTER — TELEPHONE (OUTPATIENT)
Dept: PEDIATRICS | Facility: CLINIC | Age: 43
End: 2023-04-05
Payer: COMMERCIAL

## 2023-04-05 ENCOUNTER — ALLIED HEALTH/NURSE VISIT (OUTPATIENT)
Dept: PHARMACY | Facility: CLINIC | Age: 43
End: 2023-04-05
Payer: COMMERCIAL

## 2023-04-05 VITALS — WEIGHT: 293 LBS | DIASTOLIC BLOOD PRESSURE: 80 MMHG | SYSTOLIC BLOOD PRESSURE: 128 MMHG | BODY MASS INDEX: 45.01 KG/M2

## 2023-04-05 DIAGNOSIS — F41.1 GAD (GENERALIZED ANXIETY DISORDER): ICD-10-CM

## 2023-04-05 DIAGNOSIS — F33.0 MILD RECURRENT MAJOR DEPRESSION (H): ICD-10-CM

## 2023-04-05 DIAGNOSIS — E11.9 TYPE 2 DIABETES MELLITUS WITHOUT COMPLICATION, WITHOUT LONG-TERM CURRENT USE OF INSULIN (H): Primary | ICD-10-CM

## 2023-04-05 DIAGNOSIS — G43.009 MIGRAINE WITHOUT AURA AND WITHOUT STATUS MIGRAINOSUS, NOT INTRACTABLE: ICD-10-CM

## 2023-04-05 DIAGNOSIS — E66.01 MORBID OBESITY (H): ICD-10-CM

## 2023-04-05 PROCEDURE — 99207 PR NO CHARGE LOS: CPT

## 2023-04-05 RX ORDER — BUPROPION HYDROCHLORIDE 150 MG/1
150 TABLET ORAL EVERY MORNING
Qty: 30 TABLET | Refills: 1 | Status: SHIPPED | OUTPATIENT
Start: 2023-04-05 | End: 2023-05-08

## 2023-04-05 RX ORDER — TIRZEPATIDE 5 MG/.5ML
5 INJECTION, SOLUTION SUBCUTANEOUS
Qty: 2 ML | Refills: 0 | Status: SHIPPED | OUTPATIENT
Start: 2023-04-05 | End: 2023-04-24

## 2023-04-05 RX ORDER — BLOOD-GLUCOSE SENSOR
1 EACH MISCELLANEOUS
Qty: 6 EACH | Refills: 3 | Status: SHIPPED | OUTPATIENT
Start: 2023-04-05

## 2023-04-05 RX ORDER — INSULIN DETEMIR 100 [IU]/ML
20 INJECTION, SOLUTION SUBCUTANEOUS AT BEDTIME
Start: 2023-04-05 | End: 2023-05-21

## 2023-04-05 RX ORDER — TIRZEPATIDE 2.5 MG/.5ML
2.5 INJECTION, SOLUTION SUBCUTANEOUS
Qty: 2 ML | Refills: 0 | Status: SHIPPED | OUTPATIENT
Start: 2023-04-05 | End: 2023-05-08 | Stop reason: DRUGHIGH

## 2023-04-05 NOTE — TELEPHONE ENCOUNTER
Prior Authorization Retail Medication Request    Medication/Dose: tirzepatide (MOUNJARO) 5 MG/0.5ML pen  ICD code (if different than what is on RX):  E11.9  Previously Tried and Failed:  na  Rationale:  na    Insurance Name:  [facility]  Insurance ID:  [id number]      Pharmacy Information (if different than what is on RX)  [facility]  Phone:  [phone]

## 2023-04-05 NOTE — PATIENT INSTRUCTIONS
Recommendations from today's MTM visit:                                                    MTM (medication therapy management) is a service provided by a clinical pharmacist designed to help you get the most of out of your medicines.   Today we reviewed what your medicines are for, how to know if they are working, that your medicines are safe and how to make your medicine regimen as easy as possible.        Start Bupropion  mg tablet once daily, recommend taking this in the morning as it can be activating. I confirmed this your provider. Please reach out if you notice any side effects or worsening in mood   Reassess efficacy with provider Ira Marcus at next office visit.     Continue Ozempic 1 mg weekly until we are able to confirm that Mounjaro will be covered. Plan to stop Ozempic and substitute with Mounjaro at the time of your next usual dose. Take Mounjaro 2.5 mg once weekly injection for 4 weeks, then increase to Mounjaro 5 mg weekly if tolerating.   1 pen = 1 dose, you will get 4 pens at a time. Prescriptions were sent for both pens to your pharmacy.   I have included the information below for the savings program, this can be added on top of your insurance to help cover the co-pay. Please present this to your pharmacy when going to pick-up Mounjaro.       Start Freestyle Jasbir 3 sensors - you will need to download the Freestyle Jasbir 3 ciera. Sent prescriptions to your pharmacy for the new sensors. Instructions for applying are comparable to the Jasbir 2 sensors, except the Jasbir 3 sensor is loaded as shown below - no need to click sensor into place.    May consider purchasing Skin-Tac or 'adhesive wipes' online to help keep the Libre3 Sensor on. You would apply adhesive to area of your arm where you will place the sensor, do not repeat this until you need to replace the sensor. Recommend wearing gloves to prevent adhesive from getting stuck to your hands.               Consider monitoring home blood  "pressure a couple times each week & recording these numbers leading up to your appointment with Ira Marcus.     Follow-up: Return in about 9 weeks (around 6/7/2023) for Follow up, with me, using a phone visit.    It was great speaking with you today.  I value your experience and would be very thankful for your time in providing feedback in our clinic survey. In the next few days, you may receive an email or text message from [facility] with a link to a survey related to your  clinical pharmacist.\"     To schedule another MTM appointment, please call the clinic directly or you may call the MTM scheduling line at [phone] or toll-free at [phone].     My Clinical Pharmacist's contact information:                                                      Please feel free to contact me with any questions or concerns you have.      Jayleen Ha, PharmD  Medication Therapy Management Resident  Pager: [phone]     "

## 2023-04-06 ENCOUNTER — TELEPHONE (OUTPATIENT)
Dept: PEDIATRICS | Facility: CLINIC | Age: 43
End: 2023-04-06

## 2023-04-06 NOTE — TELEPHONE ENCOUNTER
Prior Authorization Retail Medication Request    Medication/Dose: tirzepatide (MOUNJARO) 2.5 MG/0.5ML pen  ICD code (if different than what is on RX):  E11.9  Previously Tried and Failed:  na  Rationale:  na    Insurance Name:  [facility]  Insurance ID:  [id number]      Pharmacy Information (if different than what is on RX)  [facility]  [facility]  Phone:  [phone]

## 2023-04-11 DIAGNOSIS — E11.9 TYPE 2 DIABETES MELLITUS WITHOUT COMPLICATION, WITHOUT LONG-TERM CURRENT USE OF INSULIN (H): ICD-10-CM

## 2023-04-12 RX ORDER — TIRZEPATIDE 2.5 MG/.5ML
INJECTION, SOLUTION SUBCUTANEOUS
Qty: 2 ML | Refills: 0 | OUTPATIENT
Start: 2023-04-12

## 2023-04-21 DIAGNOSIS — E66.01 MORBID OBESITY (H): ICD-10-CM

## 2023-04-21 DIAGNOSIS — E11.9 TYPE 2 DIABETES MELLITUS WITHOUT COMPLICATION, WITHOUT LONG-TERM CURRENT USE OF INSULIN (H): ICD-10-CM

## 2023-04-21 RX ORDER — PHENTERMINE HYDROCHLORIDE 37.5 MG/1
CAPSULE ORAL
Qty: 30 CAPSULE | Refills: 0 | Status: SHIPPED | OUTPATIENT
Start: 2023-04-21 | End: 2023-05-18

## 2023-04-21 RX ORDER — ATORVASTATIN CALCIUM 20 MG/1
TABLET, FILM COATED ORAL
Qty: 90 TABLET | Refills: 0 | Status: SHIPPED | OUTPATIENT
Start: 2023-04-21 | End: 2023-12-01

## 2023-04-24 ENCOUNTER — MYC MEDICAL ADVICE (OUTPATIENT)
Dept: PEDIATRICS | Facility: CLINIC | Age: 43
End: 2023-04-24
Payer: COMMERCIAL

## 2023-04-24 DIAGNOSIS — E11.9 TYPE 2 DIABETES MELLITUS WITHOUT COMPLICATION, WITHOUT LONG-TERM CURRENT USE OF INSULIN (H): ICD-10-CM

## 2023-04-24 RX ORDER — TIRZEPATIDE 5 MG/.5ML
INJECTION, SOLUTION SUBCUTANEOUS
Qty: 2 ML | Refills: 0 | OUTPATIENT
Start: 2023-04-24

## 2023-04-24 RX ORDER — TIRZEPATIDE 5 MG/.5ML
INJECTION, SOLUTION SUBCUTANEOUS
Qty: 2 ML | Refills: 0 | Status: SHIPPED | OUTPATIENT
Start: 2023-04-24 | End: 2023-07-13

## 2023-04-24 NOTE — TELEPHONE ENCOUNTER
Routing refill request to provider for review/approval because:  Drug not on the [facility] refill protocol     Laci MARES RN 4/24/2023 at 3:04 PM

## 2023-04-25 ENCOUNTER — MYC MEDICAL ADVICE (OUTPATIENT)
Dept: PEDIATRICS | Facility: CLINIC | Age: 43
End: 2023-04-25
Payer: COMMERCIAL

## 2023-04-26 ENCOUNTER — NURSE TRIAGE (OUTPATIENT)
Dept: PEDIATRICS | Facility: CLINIC | Age: 43
End: 2023-04-26

## 2023-04-26 ENCOUNTER — VIRTUAL VISIT (OUTPATIENT)
Dept: URGENT CARE | Facility: CLINIC | Age: 43
End: 2023-04-26
Payer: COMMERCIAL

## 2023-04-26 DIAGNOSIS — U07.1 INFECTION DUE TO 2019 NOVEL CORONAVIRUS: Primary | ICD-10-CM

## 2023-04-26 PROCEDURE — 99213 OFFICE O/P EST LOW 20 MIN: CPT | Mod: CS

## 2023-04-26 NOTE — PROGRESS NOTES
Ambar Jin is being evaluated via a billable video visit.      Assessment & Plan:     Pt eligible for COVID treatment due to: DM, asthma, obesity.  Symptoms mild, pt is stable. Rx Paxlovid.  Hold atorvastatin x 8 days.   Advised Paxlovid may decrease effectiveness of bupropion.     See patient instructions below.  At the end of the encounter, I discussed results, diagnosis, medications. Discussed red flags for being seen in person at clinic/ER, as well as indications for follow up if no improvement. Patient understood and agreed to plan.       ICD-10-CM    1. Infection due to 2019 novel coronavirus  U07.1 nirmatrelvir and ritonavir (PAXLOVID) 300 mg/100 mg therapy pack            No follow-ups on file.    Video-Visit Details    Type of service:  Video Visit    Video Start Time: 2:02pm  Video End Time: 2:10pm    Originating Location (pt. Location): Home    Distant Location (provider location):  [facility]     Mode of Communication:  Video Conference via CEDRICK Coles, JEWEL  [place] [facility]    Subjective:   Ambar Jin is a 42 year old female who is contacted via telephone thru scheduled urgent care virtual visit to discuss: No chief complaint on file.    Dry cough, chest tightness with exertion, nasal congestion, headache, body aches, fatigue and sore throat onset 3 days ago. She has a hx of asthma and has not required her albuterol inhaler. She tested positive for COVID yesterday. No treatments tried. Patient reports no fever/chills, headache, shortness of breath, abdominal pain, nausea, vomiting, diarrhea, rash, or any other symptoms.     Past Medical History:   Diagnosis Date     Anxiety      Asthma      Depression      Diabetes mellitus, type 2 (H)      PCOS (polycystic ovarian syndrome)        Objective:   Gen:  NAD  Pulm: non-labored work of breathing    No results found for any visits on 04/26/23.    There are no Patient Instructions on  file for this visit.

## 2023-04-26 NOTE — TELEPHONE ENCOUNTER
Called patient and informed of provider recommendations. She verbally understood and will call us if she has any further questions or concerns.     SAIMA Roblero on 4/26/2023 at 1:17 PM

## 2023-04-26 NOTE — TELEPHONE ENCOUNTER
Patient called back.     RN COVID TREATMENT VISIT  04/26/23      The patient has been triaged and does not require a higher level of care.    Ambar Jin  42 year old  Current weight? 294.0 lb    Has the patient been seen by a primary care provider at an [facility] or [facility] within the past two years? Yes.   Have you been in close proximity to/do you have a known exposure to a person with a confirmed case of influenza? No.     General treatment eligibility:  Date of positive COVID test (PCR or at home)?  04/25/2023    Are you or have you been hospitalized for this COVID-19 infection? No.   Have you received monoclonal antibodies or antiviral treatment for COVID-19 since this positive test? No.   Do you have any of the following conditions that place you at risk of being very sick from COVID-19?   - Chronic lung diseases such as asthma, bronchiectasis, COPD, interstitial lung disease, pulmonary embolism, pulmonary hypertension   - Diabetes mellitus, type 1 and type 2  - Mental health disorders including mood disorders, depression, schizophrenia spectrum disorders   - Overweight or Obesity (BMI >85th percentile or BMI 25 or higher)  Yes, patient has at least one high risk condition as noted above.     Current COVID symptoms:   - cough  - shortness of breath or difficulty breathing  - fatigue  - muscle or body aches  - headache  - sore throat  - congestion or runny nose  Yes. Patient has at least one symptom as selected.     How many days since symptoms started? 5 days or less. Established patient, 12 years or older weighing at least 88.2 lbs, who has symptoms that started in the past 5 days, has not been hospitalized nor received treatment already, and is at risk for being very sick from COVID-19.     Treatment eligibility by RN:    Are you currently pregnant or nursing? No    Do you have a clinically significant hypersensitivity to nirmatrelvir or ritonavir, or toxic epidermal  "necrolysis (TEN) or Singh-Efrain Syndrome? No    Do you have a history of hepatitis, any hepatic impairment on the Problem List (such as Child-Godinez Class C, cirrhosis, fatty liver disease, alcoholic liver disease), or was the last liver lab (hepatic panel, ALT, AST, ALK Phos, bilirubin) elevated in the past 6 months? YES    Do you have any history of severe renal impairment (eGFR < 30mL/min)? No    Is patient eligible to continue? No, patient does not meet all eligibility requirements for the RN COVID treatment (as denoted by yes response(s) above). Patient informed they will need a virtual provider visit to assess treatment options.    Patient will be transferred to a  at the end of this call.   Annika Jolley RN                    Sunday symptoms started, mild cough,     Monday test was negative. Then she felt worse yesterday and tested positive.         Reason for Disposition    MILD difficulty breathing (e.g., minimal/no SOB at rest, SOB with walking, pulse <100)    Answer Assessment - Initial Assessment Questions  1. COVID-19 DIAGNOSIS: \"Who made your COVID-19 diagnosis?\" \"Was it confirmed by a positive lab test or self-test?\" If not diagnosed by a doctor (or NP/PA), ask \"Are there lots of cases (community spread) where you live?\" Note: See public health department website, if unsure.      Home test positive 04/25/2023  2. COVID-19 EXPOSURE: \"Was there any known exposure to COVID before the symptoms began?\" CDC Definition of close contact: within 6 feet (2 meters) for a total of 15 minutes or more over a 24-hour period.       Was out of town at a friends house  3. ONSET: \"When did the COVID-19 symptoms start?\"       Sunday  4. WORST SYMPTOM: \"What is your worst symptom?\" (e.g., cough, fever, shortness of breath, muscle aches)      Has a weird burning feeling in her chest when she coughs and moves around. It is not felt when not doing anything  5. COUGH: \"Do you have a cough?\" If Yes, ask: \"How bad " "is the cough?\"        Yes, dry cough, coughs pretty cough. NOC is worse  6. FEVER: \"Do you have a fever?\" If Yes, ask: \"What is your temperature, how was it measured, and when did it start?\"      Not that she has noticed, denies chills  7. RESPIRATORY STATUS: \"Describe your breathing?\" (e.g., shortness of breath, wheezing, unable to speak)       SOB with walking, intermittent wheezing  8. BETTER-SAME-WORSE: \"Are you getting better, staying the same or getting worse compared to yesterday?\"  If getting worse, ask, \"In what way?\"      Worse, has a headache and body aches  9. HIGH RISK DISEASE: \"Do you have any chronic medical problems?\" (e.g., asthma, heart or lung disease, weak immune system, obesity, etc.)      Yes has asthma history, obesity and DM 2  10. VACCINE: \"Have you had the COVID-19 vaccine?\" If Yes, ask: \"Which one, how many shots, when did you get it?\"        YES   11. BOOSTER: \"Have you received your COVID-19 booster?\" If Yes, ask: \"Which one and when did you get it?\"        Yes   12. PREGNANCY: \"Is there any chance you are pregnant?\" \"When was your last menstrual period?\"        No, LMP was ended on 04/24/2023, started 04/19/2023.  13. OTHER SYMPTOMS: \"Do you have any other symptoms?\"  (e.g., chills, fatigue, headache, loss of smell or taste, muscle pain, sore throat)        Fatigued, sore throat.   14. O2 SATURATION MONITOR:  \"Do you use an oxygen saturation monitor (pulse oximeter) at home?\" If Yes, ask \"What is your reading (oxygen level) today?\" \"What is your usual oxygen saturation reading?\" (e.g., 95%)        NA    Protocols used: CORONAVIRUS (COVID-19) DIAGNOSED OR [id number]      "

## 2023-04-26 NOTE — TELEPHONE ENCOUNTER
Called patient. Got VM and LMTCB and speak with any triage nurse.     SAIMA Roblero on 4/26/2023 at 10:03 AM

## 2023-04-26 NOTE — TELEPHONE ENCOUNTER
"S-(situation): Patient tested positive for covid 04/25/2023. Symptoms started 04/23/2023.    B-(background): Symptoms started 04/23/2023.  Patient reports some shortness of breath with ambulation and a weird burning sensation in her upper chest. She denies fever, chest pain, palpitations, dizziness. She has a mainly dry cough, nasal congestion, headache, body aches, fatigue and sore throat. She has a history of allergy induced asthma and has an inhaler at home.     A-(assessment): Patient was triaged and Covid tx protocol completed with her. She is set up for a virtual visit this afternoon with a provider art 2 pm due to the hepatic steatosis.     R-(recommendations):   #1- Disposition is to speak with PCP and call back in 1 hour. I already set her up for a covid virtual visit due to the protocol questions.     #2- Can her 3 month follow up with you on 05/04/2023 be changed to virtual?    #3- Do you have any other recommendations besides the virtual visit for the SOB with ambulation? She reports intermittent wheezing.  She has an albuterol inhaler at home which is for use when exposed to cats. Please advise.    Thank you.    SAIMA Roblero on 4/26/2023 at 12:33 PM          Reason for Disposition    MILD difficulty breathing (e.g., minimal/no SOB at rest, SOB with walking, pulse <100)    Answer Assessment - Initial Assessment Questions  1. COVID-19 DIAGNOSIS: \"Who made your COVID-19 diagnosis?\" \"Was it confirmed by a positive lab test or self-test?\" If not diagnosed by a doctor (or NP/PA), ask \"Are there lots of cases (community spread) where you live?\" Note: See public health department website, if unsure.      Home test positive 04/25/2023  2. COVID-19 EXPOSURE: \"Was there any known exposure to COVID before the symptoms began?\" CDC Definition of close contact: within 6 feet (2 meters) for a total of 15 minutes or more over a 24-hour period.       Was out of town at a friends house  3. ONSET: \"When did the COVID-19 " "symptoms start?\"       Sunday  4. WORST SYMPTOM: \"What is your worst symptom?\" (e.g., cough, fever, shortness of breath, muscle aches)      Has a weird burning feeling in her chest when she coughs and moves around. It is not felt when not doing anything  5. COUGH: \"Do you have a cough?\" If Yes, ask: \"How bad is the cough?\"        Yes, dry cough, coughs pretty cough. NOC is worse  6. FEVER: \"Do you have a fever?\" If Yes, ask: \"What is your temperature, how was it measured, and when did it start?\"      Not that she has noticed, denies chills  7. RESPIRATORY STATUS: \"Describe your breathing?\" (e.g., shortness of breath, wheezing, unable to speak)       SOB with walking, intermittent wheezing  8. BETTER-SAME-WORSE: \"Are you getting better, staying the same or getting worse compared to yesterday?\"  If getting worse, ask, \"In what way?\"      Worse, has a headache and body aches  9. HIGH RISK DISEASE: \"Do you have any chronic medical problems?\" (e.g., asthma, heart or lung disease, weak immune system, obesity, etc.)      Yes has asthma history, obesity and DM 2  10. VACCINE: \"Have you had the COVID-19 vaccine?\" If Yes, ask: \"Which one, how many shots, when did you get it?\"        YES   11. BOOSTER: \"Have you received your COVID-19 booster?\" If Yes, ask: \"Which one and when did you get it?\"        Yes   12. PREGNANCY: \"Is there any chance you are pregnant?\" \"When was your last menstrual period?\"        No, LMP was ended on 04/24/2023, started 04/19/2023.  13. OTHER SYMPTOMS: \"Do you have any other symptoms?\"  (e.g., chills, fatigue, headache, loss of smell or taste, muscle pain, sore throat)        Fatigued, sore throat.   14. O2 SATURATION MONITOR:  \"Do you use an oxygen saturation monitor (pulse oximeter) at home?\" If Yes, ask \"What is your reading (oxygen level) today?\" \"What is your usual oxygen saturation reading?\" (e.g., 95%)        NA    Protocols used: CORONAVIRUS (COVID-19) DIAGNOSED OR [id number]     "

## 2023-04-26 NOTE — TELEPHONE ENCOUNTER
R-(recommendations):   #1- Disposition is to speak with PCP and call back in 1 hour. I already set her up for a covid virtual visit due to the protocol questions. Agree with covid vrt visit.      #2- Can her 3 month follow up with you on 05/04/2023 be changed to virtual? Yes, however she will need labs drawn, so if she would prefer to reschedule until she is feeling better that is reasonable, [facility] or same day is okay.      #3- Do you have any other recommendations besides the virtual visit for the SOB with ambulation? She reports intermittent wheezing.  She has an albuterol inhaler at home which is for use when exposed to cats. Please advise. Recommend albuterol as needed for shortness of breath as well as supportive care for covid symptoms at home. If she has chest pain with the SOB, she needs to go to the ED.   Provider Response to 2nd Level Triage Request    I have reviewed the RN documentation. My recommendation is:  Virtual Visit Same Day: to be seen by another provider in same service line

## 2023-05-03 NOTE — TELEPHONE ENCOUNTER
Assessment/Plan:    Diagnoses and all orders for this visit:    Umbilical hernia    Indirect right inguinal hernia    Risks and benefits of right inguinal hernia repair as well as umbilical hernia repair were discussed with him including the potential for recurrence, spermatic cord injury, bowel injury, or pain issues and he agrees to proceed  He sees his cardiologist in [place], Dr Gerson Schumacher ([phone]) monthly  I have asked Carlitosmarkie Kristy to obtain any updated EKGs or echocardiograms with his history of cardiomyopathy  He will have to hold his Eliquis for 2 days and aspirin for 7 days prior to surgery  Due to anticipation of starting a new job, he would like to schedule in July      Subjective:      Patient ID: Kelly Medina is a 65 y o  male  Patient presents for right inguinal hernia consult  States he has had a bulge RLQ for 5+ years  He had sharp pain RLQ on 4/28/2023 and went to the Emergency Room  His discomfort has settled down  CT abdomen pelvis 4/28/2023   IMPRESSION:  Large indirect right inguinal hernia containing small bowel loops and mesentery without evidence of bowel obstruction  Bilateral hydroceles left greater than right  Enlarged prostate gland  Urinary bladder diverticulum  Umbilical hernia containing fat  Heterogeneity in the liver parenchyma indeterminate favored due to fatty infiltration  However MRI on nonemergent basis suggested for definite characterization to exclude any space-occupying lesion    Has known about this hernia for at least 5 years  Normally it was reducible  When he visited the emergency room he had a difficult time reducing it  However once assessed by the ED staff he was able to be discharged with outpatient follow-up  History of a cardiomyopathy  He is unclear of the etiology  Recently had an implantable defibrillator placed in January of this year    He is on Eliquis and aspirin  He is type II diabetic with last hemoglobin A1c Central Prior Authorization Team  Phone: [phone]    Prior Authorization Approval    Authorization Effective Date: 3/11/2023  Authorization Expiration Date: 4/9/2024  Medication: tirzepatide (MOUNJARO) 5 MG/0.5ML pen  Approved Dose/Quantity:   Reference #:     Insurance Company: [employer] - Phone [phone] Fax [phone]  Expected CoPay:       CoPay Card Available:      Foundation Assistance Needed:    Which Pharmacy is filling the prescription (Not needed for infusion/clinic administered): [id number] - [place], [place] - [address] [facility] [employer]  Pharmacy Notified: Yes  Patient Notified:           approximately 7  The following portions of the patient's history were reviewed and updated as appropriate:     He  has a past medical history of BPH (benign prostatic hyperplasia), Coronary artery disease (9/1/2021), Diabetes mellitus ([address] ), ICD (implantable cardioverter-defibrillator) in place, and Obesity  He  has a past surgical history that includes Cholecystectomy  His family history is not on file  He  reports that he has never smoked  He has never used smokeless tobacco  He reports that he does not drink alcohol and does not use drugs  Current Outpatient Medications   Medication Sig Dispense Refill    alfuzosin (UROXATRAL) 10 mg 24 hr tablet Take 10 mg by mouth daily      AMLODIPINE BESYLATE PO       apixaban (ELIQUIS) 5 mg Take 5 mg by mouth 2 (two) times a day      aspirin (ECOTRIN LOW STRENGTH) 81 mg EC tablet Take 81 mg by mouth (Patient not taking: Reported on 4/28/2023)      cetirizine (ZyrTEC) 10 mg tablet Take 1 tablet by mouth      cyclobenzaprine (FLEXERIL) 10 mg tablet TAKE 1 TABLET BY MOUTH DAILY AT BEDTIME 30 tablet 0    dextromethorphan-guaiFENesin (ROBITUSSIN DM)  mg/5 mL syrup Take 5 mL by mouth 3 (three) times a day as needed for cough 118 mL 0    dutasteride (AVODART) 0 5 mg capsule Take 1 capsule by mouth daily      Empagliflozin 25 MG TABS Take 25 mg by mouth      fluticasone (FLOVENT HFA) 110 MCG/ACT inhaler Inhale 2 puffs 2 (two) times a day Rinse mouth after use   (Patient not taking: Reported on 11/9/2022)      fluticasone (VERAMYST) 27 5 MCG/SPRAY nasal spray 2 sprays into each nostril daily (Patient not taking: Reported on 11/9/2022)      furosemide (LASIX) 20 mg tablet Take 2 tablets by mouth      losartan (COZAAR) 50 mg tablet Take 50 mg by mouth      metFORMIN (GLUCOPHAGE) 500 mg tablet Take 1,000 mg by mouth 2 (two) times a day with meals       metoprolol succinate (TOPROL-XL) 50 mg 24 hr tablet Take 75 mg by mouth daily      rosuvastatin (CRESTOR) "10 MG tablet Take 10 mg by mouth daily (Patient not taking: Reported on 11/9/2022)      sildenafil (REVATIO) 20 mg tablet Take 20 mg by mouth (Patient not taking: Reported on 11/9/2022)      spironolactone (ALDACTONE) 25 mg tablet Take 25 mg by mouth daily      tadalafil (CIALIS) 20 MG tablet Take 20 mg by mouth (Patient not taking: Reported on 11/9/2022)       No current facility-administered medications for this visit  He is allergic to liraglutide and pollen extract       Review of Systems   Gastrointestinal: Positive for abdominal pain  All other systems reviewed and are negative  Objective:      /93   Pulse (!) 107   Ht 5' 10\" (1 778 m)   Wt 119 kg (263 lb)   BMI 37 74 kg/m²          Physical Exam  Constitutional:       Appearance: He is obese  HENT:      Head: Atraumatic  Mouth/Throat:      Mouth: Mucous membranes are moist    Eyes:      Extraocular Movements: Extraocular movements intact  Cardiovascular:      Rate and Rhythm: Normal rate  Pulmonary:      Effort: Pulmonary effort is normal    Abdominal:      General: Bowel sounds are normal       Palpations: Abdomen is soft  Hernia: A hernia (Soft reducible umbilical hernia  Partially reducible right inguinal hernia  Left inguinal hernia) is present  Musculoskeletal:      Cervical back: Normal range of motion  Skin:     General: Skin is warm and dry  Neurological:      Mental Status: He is alert         Extremities: No edema  "

## 2023-05-08 ENCOUNTER — OFFICE VISIT (OUTPATIENT)
Dept: PEDIATRICS | Facility: CLINIC | Age: 43
End: 2023-05-08
Payer: COMMERCIAL

## 2023-05-08 VITALS
RESPIRATION RATE: 16 BRPM | HEIGHT: 68 IN | OXYGEN SATURATION: 98 % | DIASTOLIC BLOOD PRESSURE: 78 MMHG | BODY MASS INDEX: 44.21 KG/M2 | HEART RATE: 70 BPM | TEMPERATURE: 96.9 F | SYSTOLIC BLOOD PRESSURE: 118 MMHG | WEIGHT: 291.7 LBS

## 2023-05-08 DIAGNOSIS — E11.9 TYPE 2 DIABETES MELLITUS WITHOUT COMPLICATION, WITHOUT LONG-TERM CURRENT USE OF INSULIN (H): Primary | ICD-10-CM

## 2023-05-08 DIAGNOSIS — F33.0 MILD RECURRENT MAJOR DEPRESSION (H): ICD-10-CM

## 2023-05-08 DIAGNOSIS — E78.5 HYPERLIPIDEMIA LDL GOAL <100: ICD-10-CM

## 2023-05-08 LAB
ALBUMIN SERPL BCG-MCNC: 4.3 G/DL (ref 3.5–5.2)
ALP SERPL-CCNC: 102 U/L (ref 35–104)
ALT SERPL W P-5'-P-CCNC: 46 U/L (ref 10–35)
ANION GAP SERPL CALCULATED.3IONS-SCNC: 11 MMOL/L (ref 7–15)
AST SERPL W P-5'-P-CCNC: 39 U/L (ref 10–35)
BILIRUB DIRECT SERPL-MCNC: <0.2 MG/DL (ref 0–0.3)
BILIRUB SERPL-MCNC: 0.2 MG/DL
BUN SERPL-MCNC: 9.5 MG/DL (ref 6–20)
CALCIUM SERPL-MCNC: 9.2 MG/DL (ref 8.6–10)
CHLORIDE SERPL-SCNC: 104 MMOL/L (ref 98–107)
CHOLEST SERPL-MCNC: 128 MG/DL
CREAT SERPL-MCNC: 0.64 MG/DL (ref 0.51–0.95)
DEPRECATED HCO3 PLAS-SCNC: 24 MMOL/L (ref 22–29)
GFR SERPL CREATININE-BSD FRML MDRD: >90 ML/MIN/1.73M2
GLUCOSE SERPL-MCNC: 89 MG/DL (ref 70–99)
HBA1C MFR BLD: 7.5 % (ref 0–5.6)
HDLC SERPL-MCNC: 40 MG/DL
LDLC SERPL CALC-MCNC: 61 MG/DL
NONHDLC SERPL-MCNC: 88 MG/DL
POTASSIUM SERPL-SCNC: 4.5 MMOL/L (ref 3.4–5.3)
PROT SERPL-MCNC: 7.4 G/DL (ref 6.4–8.3)
SODIUM SERPL-SCNC: 139 MMOL/L (ref 136–145)
TRIGL SERPL-MCNC: 133 MG/DL

## 2023-05-08 PROCEDURE — 36415 COLL VENOUS BLD VENIPUNCTURE: CPT | Performed by: NURSE PRACTITIONER

## 2023-05-08 PROCEDURE — 80053 COMPREHEN METABOLIC PANEL: CPT | Performed by: NURSE PRACTITIONER

## 2023-05-08 PROCEDURE — 82248 BILIRUBIN DIRECT: CPT | Performed by: NURSE PRACTITIONER

## 2023-05-08 PROCEDURE — 99214 OFFICE O/P EST MOD 30 MIN: CPT | Performed by: NURSE PRACTITIONER

## 2023-05-08 PROCEDURE — 83036 HEMOGLOBIN GLYCOSYLATED A1C: CPT | Performed by: NURSE PRACTITIONER

## 2023-05-08 PROCEDURE — 80061 LIPID PANEL: CPT | Performed by: NURSE PRACTITIONER

## 2023-05-08 RX ORDER — BUPROPION HYDROCHLORIDE 150 MG/1
150 TABLET ORAL EVERY MORNING
Qty: 90 TABLET | Refills: 3 | Status: SHIPPED | OUTPATIENT
Start: 2023-05-08 | End: 2023-07-13 | Stop reason: DRUGHIGH

## 2023-05-08 NOTE — PROGRESS NOTES
"  Assessment & Plan     Type 2 diabetes mellitus without complication, without long-term current use of insulin (H)  Controlled. Current medications include mounjaro 0.5 mg weekly (first dose last week), levemir 20 units at bedtime, metformin 2000 mg daily. Diabetic eye exam is up to date, last completed at [facility] 6/2022. Just picked up the free style asad sensors but hasn't yet applied. Plan for follow-up with [facility] 6/7 as scheduled, will consider dose decrease of levemir at that time.   - Lipid panel reflex to direct LDL Fasting  - Hepatic panel (Albumin, ALT, AST, Bili, Alk Phos, TP)  - BASIC METABOLIC PANEL  - HEMOGLOBIN A1C    Hyperlipidemia LDL goal <100  Started atorvastatin three months ago, due for FLP today. Will adjust medication as indicated.     Mild recurrent major depression (H)  Improved. Plan to continue current dose of wellbutrin without changes. Follow-up annually or as needed.  - buPROPion (WELLBUTRIN XL) 150 MG 24 hr tablet; Take 1 tablet (150 mg) by mouth every morning    25 minutes spent by me on the date of the encounter doing chart review, review of test results, interpretation of tests, patient visit and documentation        BMI:   Estimated body mass index is 44.35 kg/m  as calculated from the following:    Height as of this encounter: 1.727 m (5' 8\").    Weight as of this encounter: 132.3 kg (291 lb 11.2 oz).   Weight management plan: Medically managed    MEDICATIONS:  Continue current medications without change  FUTURE APPOINTMENTS:       - Follow-up with [facility] 6/7 as scheduled.    CHUY Monge [facility] JESSICA Arechiga is a 42 year old, presenting for the following health issues:  Diabetes        5/8/2023    12:57 PM   Additional Questions   Roomed by Rosetta NUNEZ         5/8/2023    12:57 PM   Patient Reported Additional Medications   Patient reports taking the following new medications None       Diabetes Follow-up    How often are you checking " your blood sugar? Two times daily  Blood sugar testing frequency justification:  Uncontrolled diabetes  What time of day are you checking your blood sugars (select all that apply)?  After meals and At bedtime  Have you had any blood sugars above 200?  No  Have you had any blood sugars below 70?  No    What symptoms do you notice when your blood sugar is low?  None    What concerns do you have today about your diabetes? None     Do you have any of these symptoms? (Select all that apply)  No numbness or tingling in feet.  No redness, sores or blisters on feet.  No complaints of excessive thirst.  No reports of blurry vision.  No significant changes to weight.    Have you had a diabetic eye exam in the last 12 months? Yes- Date of last eye exam: 6/2022,  Location: [facility]    Tolerating mounjaro without side effects. Has had one 0.5 mg dose so far. Due for labs. Just picked up the free style asad sensors but hasn't yet applied. [facility] last exam June 2022.    Taking wellbutrin as prescribed, 150 mg daily. Denies side effects. Has noted increased motivation.     Wt Readings from Last 10 Encounters:   05/08/23 132.3 kg (291 lb 11.2 oz)   04/05/23 134.3 kg (296 lb)   02/01/23 136.1 kg (300 lb)   10/28/22 141.1 kg (311 lb)   09/30/22 142.5 kg (314 lb 1.6 oz)   08/31/22 145.5 kg (320 lb 12.8 oz)   06/20/22 142.9 kg (315 lb)   06/17/22 143.8 kg (317 lb)   03/16/22 143.9 kg (317 lb 5 oz)   01/10/22 148.5 kg (327 lb 7 oz)       BP Readings from Last 2 Encounters:   05/08/23 118/78   04/05/23 128/80     Hemoglobin A1C (%)   Date Value   02/01/2023 6.5 (H)   09/30/2022 6.5 (H)     LDL Cholesterol Calculated (mg/dL)   Date Value   02/01/2023 114 (H)   09/30/2022 81           Patient Active Problem List   Diagnosis     Migraine without aura and without status migrainosus, not intractable     AMANDA (generalized anxiety disorder)     PCOS (polycystic ovarian syndrome)     Mild recurrent major depression (H)     Type 2  diabetes mellitus without complication, without long-term current use of insulin (H)     Mild intermittent asthma without complication     Morbid obesity (H)     Hepatic steatosis     Past Medical History:   Diagnosis Date     Anxiety      Asthma      Depression      Diabetes mellitus, type 2 (H)      PCOS (polycystic ovarian syndrome)      Past Surgical History:   Procedure Laterality Date     CHOLECYSTECTOMY       FOOT FRACTURE SURGERY Left     x3     FOOT FRACTURE SURGERY Left 2011     Family History   Problem Relation Age of Onset     Diabetes Type 2  Mother      Depression Mother      Arthritis Mother      Cerebrovascular Disease Mother 60.00     Diabetes Type 2  Father      Depression Father      Early Death Sister         rare blood disorder     Lung Cancer Maternal Grandmother      Cerebrovascular Disease Maternal Grandmother      Throat cancer Maternal Grandfather      Diabetes Maternal Grandfather      Prostate Cancer Paternal Grandfather      Depression Sister      Asthma Sister      Liver Disease Sister         Primary biliary cholangitis      Social History     Socioeconomic History     Marital status:      Spouse name: Not on file     Number of children: Not on file     Years of education: Not on file     Highest education level: Not on file   Occupational History     Not on file   Tobacco Use     Smoking status: Never     Smokeless tobacco: Never   Vaping Use     Vaping status: Never Used   Substance and Sexual Activity     Alcohol use: Yes     Comment: twice per month      Drug use: Never     Sexual activity: Yes     Partners: Male   Other Topics Concern     Parent/sibling w/ CABG, MI or angioplasty before 65F 55M? Not Asked   Social History Narrative     Not on file     Social Determinants of Health     Financial Resource Strain: Not on file   Food Insecurity: Not on file   Transportation Needs: Not on file   Physical Activity: Not on file   Stress: Not on file   Social Connections: Not on  "file   Intimate Partner Violence: Not on file   Housing Stability: Not on file     Current Outpatient Medications   Medication     albuterol (PROAIR HFA/PROVENTIL HFA/VENTOLIN HFA) 108 (90 Base) MCG/ACT inhaler     atorvastatin (LIPITOR) 20 MG tablet     BD DONNELL U/F 32G X 4 MM insulin pen needle     buPROPion (WELLBUTRIN XL) 150 MG 24 hr tablet     cetirizine HCl (ZYRTEC ORAL)     cholecalciferol, vitamin D3, (VITAMIN D3 ORAL)     Continuous Blood Gluc Sensor (LegalFÃ¡cilSTYLE DOROTHY 3 SENSOR) MISC     escitalopram (LEXAPRO) 10 MG tablet     escitalopram (LEXAPRO) 20 MG tablet     ibuprofen (ADVIL/MOTRIN) 200 MG tablet     insulin detemir (LEVEMIR FLEXTOUCH) 100 UNIT/ML pen     metFORMIN (GLUCOPHAGE XR) 500 MG 24 hr tablet     MOUNJARO 5 MG/0.5ML pen     phentermine (ADIPEX-P) 37.5 MG capsule     traMADol (ULTRAM) 50 MG tablet     No current facility-administered medications for this visit.        Allergies   Allergen Reactions     Alprazolam Other (See Comments)     Irritable, no memory of what happened     Clonazepam Dizziness     No memory of what happened while on medication       Review of Systems    ROS: 10 point ROS neg other than the symptoms noted above in the HPI.        Objective    /78 (BP Location: Right arm, Patient Position: Sitting, Cuff Size: Adult Large)   Pulse 70   Temp 96.9  F (36.1  C) (Tympanic)   Resp 16   Ht 1.727 m (5' 8\")   Wt 132.3 kg (291 lb 11.2 oz)   SpO2 98%   BMI 44.35 kg/m    Body mass index is 44.35 kg/m .  Physical Exam                   "

## 2023-05-08 NOTE — Clinical Note
Last diabetic eye exam 6/2022 at [facility], results normal. No history of diabetic retinopathy.

## 2023-05-08 NOTE — Clinical Note
Hi Jayleen Arleen Araizaca is doing well! Had her first dose of mounjaro 0.5 mg last week and is tolerating well. She will be seeing you on 6/7 at which time we can consider decreasing her levemir. Labs rechecked today and are pending. Also feeling improved on wellbutrin, which is great.

## 2023-05-09 NOTE — PROGRESS NOTES
Medication Therapy Management (MTM) Encounter    ASSESSMENT:                            Medication Adherence/Access: No issues identified     Type 2 Diabetes/Weight Management: Patient is meeting A1c goal of < 7%. CGM reports indicate patient is at goal of >70% in target range. Patient would benefit from increase in Mounjaro. Recommend patient reduces Levemir by 20% with dose increase of Mounjaro - she may consider reducing to 18 units at this time if she experiences any lows, otherwise plan to reduce to 16 units daily with titration to Mounjaro 7.5 mg. Recommend patient reaches out if lows occur - as we will continue to reduce her Levemir by ~10-20% with each dose increase. Weight loss progressing with lifestyle interventions & switch to Mounjaro. LDL-C improved with atorvastatin initiation.     Depression/Anxiety: Somewhat improved. Patient would benefit from increase in Bupropion XL to 300 mg daily. Will route telephone encounter to provider to confirm this change.     PLAN:                            After completing current 5 mg pens at home, increase Mounjaro to 7.5 mg once weekly    Reduce Levemir to 16 units once daily with Mounjaro dose increase.    Please reach out to Jayleen Ha or Ira Marcus if lows occurs    Considerations for Ira Marcus: routed telephone encounter 6/7/23    Increase Bupropion  mg daily at this time. Reevaluate at follow-up with PCP     Plan to increase Mounjaro to 10 mg once weekly at next visit with PCP (orders sent today due to stock/shortage issues but change will not have occurred prior to appt).     If blood sugars remain at goal, would recommend reducing Levemir by additional 10-20% with step in Mounjaro dose    She may be able to discontinue the Levemir altogether in future     Connected to [facility]. Please let me know if you need me to pull in her CGM reports at this time, otherwise she may follow-up with Kaylyn or Olga ([facility])     Follow-up:  with Ira Marcus on 7/13/23. She may continue to manage your Mounjaro dose increase & Levemir dose reductions moving forward if you prefer.  Return in about 3 months (around 9/4/2023) for Follow up, Medication Therapy Management - Kaylyn or Olga ([facility] Pharmacists) at [facility] .     SUBJECTIVE/OBJECTIVE:                          Ambar Jin is a 42 year old female called for a follow-up visit.  Today's visit is a follow-up MTM visit from 4/5/23     Reason for visit: Mounjaro review     Allergies/ADRs: Reviewed in chart  Past Medical History: Reviewed in chart  Tobacco: She reports that she has never smoked. She has never used smokeless tobacco.  Alcohol: Reviewed in chart    Medication Adherence/Access:   No issues reported  The patient fills medications at [facility] in [place].   Buproprion to [facility]   Cost of Mounjaro has been affordable/covered     Type 2 Diabetes/Obesity:    Metformin XR 2000 mg daily with dinner  Levimir 20 units at bedtime   Mounjaro 5 mg once weekly - has been on current dose for 5-6 weeks, would like to continue to titrate her dose  Phentermine 37.5 mg every morning   Bupropion  mg once daily - see below   Patient reports no concerns with side effects.  She would like to cut back/get off insulin as we titrate Mounjaro in future     Reports she is down 10 lbs since starting Mounjaro. No home weight reported today. Now down 30 lbs since focusing on weight management (20 lbs with phentermine)     Diet: has started to notice appetite suppression with Mounjaro. Reports she has been eating healthier & limiting cravings.     Previous Medications:   Ozempic 1 MG - no noticeable weight loss or A1C lowering ; stopped in lieu of Mounjaro     Blood sugar monitoring: Continuous Glucose Monitor - Jasbir 3. Connected to [facility].     Ranges (per continuous glucose monitor):         Symptoms of low blood sugar? Reports her CGM alerted her BG was 68 yesterday, scanned  "sensor & BG was above 80 without correcting. Not sure if she was truly low but last two days of CGM readings trending low.   Symptoms of high blood sugar? None  Eye exam: due  Foot exam: up to date   Aspirin: Not taking due to age  Statin: Yes: Atorvastatin 20 mg daily, started ~2 months ago. No reported side effects  ACEi/ARB: No    Urine Albumin:   Lab Results   Component Value Date    UMALCR 24.91 02/01/2023      Lab Results   Component Value Date    A1C 6.5 02/01/2023    A1C 6.5 09/30/2022    A1C 6.1 04/11/2022    A1C 10.6 01/10/2022    A1C 8.9 06/17/2021      Wt Readings from Last 5 Encounters:   05/08/23 291 lb 11.2 oz (132.3 kg)   04/05/23 296 lb (134.3 kg)   02/01/23 300 lb (136.1 kg)   10/28/22 311 lb (141.1 kg)   09/30/22 314 lb 1.6 oz (142.5 kg)     Estimated body mass index is 44.35 kg/m  as calculated from the following:    Height as of 5/8/23: 5' 8\" (1.727 m).    Weight as of 5/8/23: 291 lb 11.2 oz (132.3 kg).    BP Readings from Last 3 Encounters:   05/08/23 118/78   04/05/23 128/80   02/01/23 128/72     Pulse Readings from Last 3 Encounters:   05/08/23 70   02/01/23 78   10/28/22 88     Recent Labs   Lab Test 05/08/23  1318 02/01/23  1310   CHOL 128 202*   HDL 40* 42*   LDL 61 114*   TRIG 133 232*     Depression/Anxiety:    Escitalopram 30 mg once daily.   Bupropion  mg daily - started ~6-8 weeks ago; noticed around week 2 that her motivation & mood was improving. No longer has been feeling this & would like to increase her dose.   No current side effects.     The patient feels like her anxiety has been controlled. Started bupropion around 6-8 weeks ago due to lows in mood/depressive symptoms & lack of motivation in day-to-day. Reports she noticed bupropion was helping this around week two - however now feels this effect has lessened, mood overall is still improved since last visit but she would like to increase her dose.     Previously, has tried Zoloft & Celexa. Escitalopram has been the best " "option so far.   No personal or family history of seizures.        6/19/2022     6:57 PM 8/31/2022    10:44 AM 2/1/2023    12:35 PM   PHQ-9 SCORE   PHQ-9 Total Score [facility] 9 (Mild depression) 5 (Mild depression) 4 (Minimal depression)   PHQ-9 Total Score 9 5 4         7/2/2020     3:00 PM 6/17/2021     9:00 AM 1/10/2022    12:58 PM   AMANDA-7 SCORE   Total Score 11 16 2     BP Readings from Last 1 Encounters:   05/08/23 118/78     Pulse Readings from Last 1 Encounters:   05/08/23 70     Wt Readings from Last 1 Encounters:   05/08/23 291 lb 11.2 oz (132.3 kg)     Ht Readings from Last 1 Encounters:   05/08/23 5' 8\" (1.727 m)     Estimated body mass index is 44.35 kg/m  as calculated from the following:    Height as of 5/8/23: 5' 8\" (1.727 m).    Weight as of 5/8/23: 291 lb 11.2 oz (132.3 kg).    Temp Readings from Last 1 Encounters:   05/08/23 96.9  F (36.1  C) (Tympanic)   ----------------    I spent 20 minutes with this patient today. All changes were made via collaborative practice agreement with CHUY Monge CNP. A copy of the visit note was provided to the patient's provider(s).    A summary of these recommendations was sent via [employer].    Jayleen Ha PharmD  Medication Therapy Management Resident  Pager: [phone]    Ambar IAN Jin was seen independently by Dr. Jayleen Ha. I have reviewed and agree with the resident note and plan of care.      Kaylyn Cuenca PharmD [facility]  Medication Therapy Management Provider  Pager #[phone]    Telemedicine Visit Details  Type of service:  Telephone visit  Start Time: 11:15am  End Time: 11:35am     Medication Therapy Recommendations  Mild recurrent major depression (H)    Current Medication: buPROPion (WELLBUTRIN XL) 150 MG 24 hr tablet   Rationale: Dose too low - Dosage too low - Effectiveness   Recommendation: Increase Dose - buPROPion 300 MG 24 hr tablet - Increase Bupropion  mg once daily in morning   Status: Contact Provider - " Awaiting Response         Type 2 diabetes mellitus without complication, without long-term current use of insulin (H)    Current Medication: MOUNJARO 5 MG/0.5ML pen   Rationale: Dose too low - Dosage too low - Effectiveness   Recommendation: Increase Dose - Mounjaro 7.5 MG/0.5ML Sopn - After completing current 5 mg pens at home, increase Mounjaro to 7.5 mg once weekly. Reduce Levemir to 16 units once daily with Mounjaro dose increase.   Status: Accepted per CPA

## 2023-05-18 DIAGNOSIS — E66.01 MORBID OBESITY (H): ICD-10-CM

## 2023-05-18 RX ORDER — PHENTERMINE HYDROCHLORIDE 37.5 MG/1
CAPSULE ORAL
Qty: 30 CAPSULE | Refills: 0 | Status: SHIPPED | OUTPATIENT
Start: 2023-05-18 | End: 2023-06-14

## 2023-05-19 DIAGNOSIS — E11.9 TYPE 2 DIABETES MELLITUS WITHOUT COMPLICATION, WITHOUT LONG-TERM CURRENT USE OF INSULIN (H): ICD-10-CM

## 2023-05-21 RX ORDER — INSULIN DETEMIR 100 [IU]/ML
INJECTION, SOLUTION SUBCUTANEOUS
Qty: 15 ML | Refills: 0 | Status: SHIPPED | OUTPATIENT
Start: 2023-05-21 | End: 2023-07-07

## 2023-05-28 DIAGNOSIS — E11.9 TYPE 2 DIABETES MELLITUS WITHOUT COMPLICATION, WITHOUT LONG-TERM CURRENT USE OF INSULIN (H): ICD-10-CM

## 2023-05-29 RX ORDER — ESCITALOPRAM OXALATE 20 MG/1
TABLET ORAL
Qty: 90 TABLET | Refills: 3 | Status: SHIPPED | OUTPATIENT
Start: 2023-05-29 | End: 2024-04-15

## 2023-05-29 NOTE — TELEPHONE ENCOUNTER
"Pharmacy change.    Last Written Prescription Date:  5/10/23  Last Fill Quantity: 90,  # refills: 3   Last office visit provider:  5/8/23     Requested Prescriptions   Pending Prescriptions Disp Refills     escitalopram (LEXAPRO) 20 MG tablet [Pharmacy Med Name: ESCITALOPRAM TABS 20MG] 90 tablet 3     Sig: TAKE 1 TABLET DAILY       SSRIs Protocol Failed - 5/28/2023 11:42 PM        Failed - Recent (12 mo) or future (30 days) visit within the authorizing provider's specialty     Patient has had an office visit with the authorizing provider or a provider within the authorizing providers department within the previous 12 mos or has a future within next 30 days. See \"Patient Info\" tab in inbasket, or \"Choose Columns\" in Meds & Orders section of the refill encounter.              Passed - Medication is active on med list        Passed - Patient is age 18 or older        Passed - No active pregnancy on record        Passed - No positive pregnancy test in last 12 months           Overridden by Jayleen Ha RPH on Apr 5, 2023 7:39 PM   Duplicate Therapy   1. ANTIDEPRESSANTS [Level: No Abuse/Dependency Potential] [Reason: Will monitor drug levels/drug effects ]   Other Orders: buPROPion (WELLBUTRIN XL) 150 MG 24 hr tablet    escitalopram (LEXAPRO) 20 MG tablet         Overridden by Ira Marcus APRN CNP on Feb 1, 2023 1:09 PM   Drug-Drug   1. TRAMADOL / SELECTIVE SEROTONIN REUPTAKE INHIBITORS [Level: Major] [Reason: Tolerated medication/side effects in past]   Other Orders: traMADol (ULTRAM) 50 MG tablet         Overridden by Ira Marcus APRN CNP on Feb 1, 2023 1:01 PM   Drug-Drug   1. TRAMADOL / SELECTIVE SEROTONIN REUPTAKE INHIBITORS [Level: Major] [Reason: Tolerated medication/side effects in past]   Other Orders: traMADol (ULTRAM) 50 MG tablet         Overridden by Ira Marcus APRN CNP on Sep 30, 2022 9:56 AM   Drug-Drug   1. IBUPROFEN / SELECTIVE SEROTONIN REUPTAKE INHIBITORS [Level: Major] [Reason: " Tolerated medication/side effects in past]   Other Orders: ibuprofen (ADVIL/MOTRIN) 200 MG tablet      2. TRAMADOL / SELECTIVE SEROTONIN REUPTAKE INHIBITORS [Level: Major] [Reason: Tolerated medication/side effects in past]   Other Orders: traMADol (ULTRAM) 50 MG tablet        CAMILA MADRIGAL RN 05/29/23 9:42 AM

## 2023-06-07 ENCOUNTER — VIRTUAL VISIT (OUTPATIENT)
Dept: PHARMACY | Facility: CLINIC | Age: 43
End: 2023-06-07
Payer: COMMERCIAL

## 2023-06-07 DIAGNOSIS — E11.9 TYPE 2 DIABETES MELLITUS WITHOUT COMPLICATION, WITHOUT LONG-TERM CURRENT USE OF INSULIN (H): Primary | ICD-10-CM

## 2023-06-07 DIAGNOSIS — F33.0 MILD RECURRENT MAJOR DEPRESSION (H): Primary | ICD-10-CM

## 2023-06-07 DIAGNOSIS — E66.01 MORBID OBESITY (H): ICD-10-CM

## 2023-06-07 DIAGNOSIS — F41.1 GAD (GENERALIZED ANXIETY DISORDER): ICD-10-CM

## 2023-06-07 DIAGNOSIS — F33.0 MILD RECURRENT MAJOR DEPRESSION (H): ICD-10-CM

## 2023-06-07 PROCEDURE — 99207 PR NO CHARGE LOS: CPT

## 2023-06-07 RX ORDER — TIRZEPATIDE 10 MG/.5ML
10 INJECTION, SOLUTION SUBCUTANEOUS
Qty: 2 ML | Refills: 0 | Status: SHIPPED | OUTPATIENT
Start: 2023-06-07 | End: 2023-08-07

## 2023-06-07 RX ORDER — TIRZEPATIDE 7.5 MG/.5ML
7.5 INJECTION, SOLUTION SUBCUTANEOUS
Qty: 2 ML | Refills: 0 | Status: SHIPPED | OUTPATIENT
Start: 2023-06-07 | End: 2023-07-13 | Stop reason: DRUGHIGH

## 2023-06-07 NOTE — Clinical Note
Amrik Roth, please see telephone encounter I routed to you 6/7/23 for bupropion increase request & plan moving forward for Mounjaro & Levemir dose adjustments. Thanks!  Jayleen Ha, PharmD MTM Pharmacist Resident

## 2023-06-08 RX ORDER — BUPROPION HYDROCHLORIDE 300 MG/1
300 TABLET ORAL EVERY MORNING
Qty: 90 TABLET | Refills: 1 | Status: SHIPPED | OUTPATIENT
Start: 2023-06-08 | End: 2023-08-07

## 2023-06-08 NOTE — PATIENT INSTRUCTIONS
"Recommendations from today's MTM visit:                                                       After completing current Mounjaro 5 mg pens at home, plan to increase Mounjaro to 7.5 mg once weekly for 4 weeks  Reduce Levemir to 16 units once daily with Mounjaro dose increase.  Please reach out to Jayleen Ha or Ira Marcus if lows occurs. We will likely need to continue to lower your Levemir dose as the Mounjaro dose is increased     Follow-up with Ira Marcus on 7/13/23   Plan to increase the Mounjaro to 10 mg once weekly at this time. I sent a prescription for the 10 mg dose to your pharmacy today  due to shortages/backorder issues. However, you will see Ira again before it times for the next step up to 10 mg     I will talk with Ira Marcus about increasing your Bupropion XL to 300 mg once daily. May also use current supply at home (150 mg tablet x 2 once daily) if Ira approve this change     Follow-up: with Ira Marcus on 7/13/23. She may continue to manage your Mounjaro dose increase & Levemir dose reductions moving forward if you prefer. Return in about 3 months (around 9/4/2023) for Follow up, Medication Therapy Management - Kaylyn or Olga (MTM Pharmacists) at [facility] .    It was great speaking with you today.  I value your experience and would be very thankful for your time in providing feedback in our clinic survey. In the next few days, you may receive an email or text message from Picovico Caarbon with a link to a survey related to your  clinical pharmacist.\"     To schedule another MTM appointment, please call the clinic directly or you may call the MTM scheduling line at [phone] or toll-free at [phone].     My Clinical Pharmacist's contact information:                                                      Please feel free to contact me with any questions or concerns you have.      Jayleen Ha, PharmD  Medication Therapy Management Resident  Pager: [phone]     "

## 2023-06-08 NOTE — TELEPHONE ENCOUNTER
Amrik Roth. Routing pended orders for bupropion  mg once daily. She would like to increase this dose if you are comfortable with change. Saw patient for MTM today (6/7/23).     Continues to make progress with weight loss & glycemic control.  Sent orders for Mounjaro 7.5 mg once weekly (she will increase in around 2-3 weeks), as well as Mounjaro 10 mg pens to prevent delay in getting these with stock issues. Suspect she will be able to stop or substantially reduce Levemir dose as we continue to titrate Mounjaro.    She is scheduled to follow-up with you 7/13/23 - prior to next step-up in Mounjaro to 10 mg dose. Would recommend reducing Levemir an additional 20% if BG remains at goal. Let me know if you would like me to pull in her CGM reports for this visit in July as we get closer to this.     Offered MTM follow-up 4-8 weeks after appointment with you in July depending on where she is at. Informed her that she will need to schedule with Olga or Kaylyn moving forward.     Jayleen Ha, PharmD  MTM Pharmacist Resident

## 2023-06-14 DIAGNOSIS — E66.01 MORBID OBESITY (H): ICD-10-CM

## 2023-06-14 NOTE — TELEPHONE ENCOUNTER
Filled 4/24 and 5/18 -> should be okay for PCP to fill next week.     NATHAN Witt MD  Internal Medicine-Pediatrics

## 2023-06-18 RX ORDER — PHENTERMINE HYDROCHLORIDE 37.5 MG/1
CAPSULE ORAL
Qty: 30 CAPSULE | Refills: 0 | Status: SHIPPED | OUTPATIENT
Start: 2023-06-18 | End: 2023-07-25

## 2023-07-07 DIAGNOSIS — E11.9 TYPE 2 DIABETES MELLITUS WITHOUT COMPLICATION, WITHOUT LONG-TERM CURRENT USE OF INSULIN (H): ICD-10-CM

## 2023-07-11 DIAGNOSIS — E66.01 MORBID OBESITY (H): ICD-10-CM

## 2023-07-11 DIAGNOSIS — E11.9 TYPE 2 DIABETES MELLITUS WITHOUT COMPLICATION, WITHOUT LONG-TERM CURRENT USE OF INSULIN (H): ICD-10-CM

## 2023-07-11 RX ORDER — INSULIN DETEMIR 100 [IU]/ML
INJECTION, SOLUTION SUBCUTANEOUS
Qty: 15 ML | Refills: 0 | Status: SHIPPED | OUTPATIENT
Start: 2023-07-11 | End: 2023-08-01

## 2023-07-11 RX ORDER — TIRZEPATIDE 7.5 MG/.5ML
7.5 INJECTION, SOLUTION SUBCUTANEOUS
Qty: 2 ML | Refills: 0 | OUTPATIENT
Start: 2023-07-11

## 2023-07-11 NOTE — TELEPHONE ENCOUNTER
Patient has upcoming appt where refills can be addressed.    Next 5 appointments (look out 90 days)    Jul 13, 2023 11:00 AM  (Arrive by 10:40 AM)  Provider Visit with CHUY Monge CNP  [facility] ([facility] ) [address] [place] [id number] [phone]

## 2023-07-11 NOTE — TELEPHONE ENCOUNTER
Prescription approved per [facility] Refill Protocol.  Pt has appointment scheduled  Jinny Carrillo RN, BSN  [facility]

## 2023-07-13 ENCOUNTER — VIRTUAL VISIT (OUTPATIENT)
Dept: PEDIATRICS | Facility: CLINIC | Age: 43
End: 2023-07-13
Payer: COMMERCIAL

## 2023-07-13 ENCOUNTER — MYC MEDICAL ADVICE (OUTPATIENT)
Dept: PEDIATRICS | Facility: CLINIC | Age: 43
End: 2023-07-13

## 2023-07-13 DIAGNOSIS — F41.1 GAD (GENERALIZED ANXIETY DISORDER): ICD-10-CM

## 2023-07-13 DIAGNOSIS — F33.0 MILD RECURRENT MAJOR DEPRESSION (H): ICD-10-CM

## 2023-07-13 DIAGNOSIS — E66.01 MORBID OBESITY (H): ICD-10-CM

## 2023-07-13 DIAGNOSIS — E11.9 TYPE 2 DIABETES MELLITUS WITHOUT COMPLICATION, WITHOUT LONG-TERM CURRENT USE OF INSULIN (H): ICD-10-CM

## 2023-07-13 DIAGNOSIS — E66.01 MORBID OBESITY (H): Primary | ICD-10-CM

## 2023-07-13 PROCEDURE — 99214 OFFICE O/P EST MOD 30 MIN: CPT | Mod: VID | Performed by: NURSE PRACTITIONER

## 2023-07-13 NOTE — PROGRESS NOTES
Geni is a 42 year old who is being evaluated via a billable video visit.      How would you like to obtain your AVS? MyChart  If the video visit is dropped, the invitation should be resent by: Text to cell phone: [phone]  Will anyone else be joining your video visit? No          Assessment & Plan     Morbid obesity (H)  Type 2 diabetes mellitus without complication, without long-term current use of insulin (H)  Completed one month on 7.5 mg weekly, plan to increase to 10 mg weekly tomorrow. Is tolerating the mounjaro well, moments of nausea but tolerable. Current weight is 280 (down 16 pounds since starting mounjaro 4/5/2023, down 40 pounds since starting phentermine 8/30/2022). Levimir, taking 18 units/night.  in am, throughout the day blood sugars hovering around 90. Plan to decrease by an additional ~20% with mounjaro increase, start 15 units at bedtime beginning tomorrow. Continue to monitor blood sugars. Follow-up with MTM in 1 month.       Mild recurrent major depression (H)  AMANDA (generalized anxiety disorder)  Increased wellbutrin to 300 mg about a month ago. Has noticed increased emotional lability but appreciates an increased focus. Plan to give this another month before considering med adjustment. She continues on lexapro 30 mg daily. Follow-up with MTM in 1 month.         27 minutes spent by me on the date of the encounter doing chart review, patient visit and documentation        MEDICATIONS:        - Increase mounjaro to 10 mg       - Decrease levimir to 15 units       - Continue other medications without change  FUTURE APPOINTMENTS:       - Follow-up visit with MTM in 1 month.    CHUY Monge CNP  [facility] JESSICA Conti   Geni is a 42 year old, presenting for the following health issues:  Recheck Medication        5/8/2023    12:57 PM   Additional Questions   Roomed by Rosetta CABALLERO     Presents today for follow-up: Mounjaro.     Per last visit with MTM  "6/7/23:  -Increase Bupropion  mg daily at this time. Reevaluate at follow-up with PCP   -Plan to increase Mounjaro to 10 mg once weekly at next visit with PCP (orders sent today due to stock/shortage issues but change will not have occurred prior to appt).   -If blood sugars remain at goal, would recommend reducing Levemir by additional 10-20% with step in Mounjaro dose (-3 units)  -She may be able to discontinue the Levemir altogether in future     Mounjaro - Taking 7.5 mg weekly. Has been on this dose for a month now. Is tolerating the mounjaro well, moments of nausea but tolerable. Current weight is 280.   8/30/22 - started phentermine (down total 40 lbs since starting phentermine)  4/5/2023 - started mounjaro (down 16 pounds since starting mounjaro).    Levimir, taking 18 units/night.  in am, throughout the day blood sugars hovering around 90.    Bupropion - increased to 300 mg one month ago, things are going \"not that great.\" Can still be crying or mad at the drop of a hat, emotional lability. This lability has been increased on the 300 mg. That said, she likes the way it helps with her focus.     Lexapro - stable on 30 mg.     Wt Readings from Last 10 Encounters:   05/08/23 132.3 kg (291 lb 11.2 oz)   04/05/23 134.3 kg (296 lb)   02/01/23 136.1 kg (300 lb)   10/28/22 141.1 kg (311 lb)   09/30/22 142.5 kg (314 lb 1.6 oz)   08/31/22 145.5 kg (320 lb 12.8 oz)   06/20/22 142.9 kg (315 lb)   06/17/22 143.8 kg (317 lb)   03/16/22 143.9 kg (317 lb 5 oz)   01/10/22 148.5 kg (327 lb 7 oz)         Review of Systems    ROS: 10 point ROS neg other than the symptoms noted above in the HPI.        Objective       Vitals:  No vitals were obtained today due to virtual visit.    Physical Exam   GENERAL: Healthy, alert and no distress  EYES: Eyes grossly normal to inspection.  No discharge or erythema, or obvious scleral/conjunctival abnormalities.  RESP: No audible wheeze, cough, or visible cyanosis.  No visible " retractions or increased work of breathing.    SKIN: Visible skin clear. No significant rash, abnormal pigmentation or lesions.  NEURO: Cranial nerves grossly intact.  Mentation and speech appropriate for age.  PSYCH: Mentation appears normal, affect normal/bright, judgement and insight intact, normal speech and appearance well-groomed.          Video-Visit Details    Type of service:  Video Visit     Originating Location (pt. Location): Home    Distant Location (provider location):  Off-site  Platform used for Video Visit: Solitario

## 2023-07-25 RX ORDER — PHENTERMINE HYDROCHLORIDE 37.5 MG/1
37.5 CAPSULE ORAL EVERY MORNING
Qty: 30 CAPSULE | Refills: 0 | Status: SHIPPED | OUTPATIENT
Start: 2023-07-25 | End: 2023-08-23

## 2023-08-01 DIAGNOSIS — E11.9 TYPE 2 DIABETES MELLITUS WITHOUT COMPLICATION, WITHOUT LONG-TERM CURRENT USE OF INSULIN (H): ICD-10-CM

## 2023-08-03 NOTE — TELEPHONE ENCOUNTER
Routing refill request to provider for review/approval because:  See note, flex touch does not exist, send new rx for flex pen  Jinny Carrillo RN, BSN  [facility]

## 2023-08-04 DIAGNOSIS — E11.9 TYPE 2 DIABETES MELLITUS WITHOUT COMPLICATION, WITHOUT LONG-TERM CURRENT USE OF INSULIN (H): Primary | ICD-10-CM

## 2023-08-04 RX ORDER — INSULIN DETEMIR 100 [IU]/ML
INJECTION, SOLUTION SUBCUTANEOUS
Qty: 15 ML | Refills: 0 | Status: SHIPPED | OUTPATIENT
Start: 2023-08-04 | End: 2023-08-14

## 2023-08-04 NOTE — TELEPHONE ENCOUNTER
"Sapna,    Pharmacy calling to request Levemir prescription be changed from the \"flex touch\" to the \"flex pen\" as the flex touch is no longer made.    Pended med and pharmacy, please review and advise. Thanks!  Cedrick Ray RN on 8/4/2023 at 2:44 PM    "

## 2023-08-06 ENCOUNTER — HEALTH MAINTENANCE LETTER (OUTPATIENT)
Age: 43
End: 2023-08-06

## 2023-08-07 ENCOUNTER — MYC REFILL (OUTPATIENT)
Dept: PHARMACY | Facility: CLINIC | Age: 43
End: 2023-08-07
Payer: COMMERCIAL

## 2023-08-07 DIAGNOSIS — E66.01 MORBID OBESITY (H): ICD-10-CM

## 2023-08-07 DIAGNOSIS — E11.9 TYPE 2 DIABETES MELLITUS WITHOUT COMPLICATION, WITHOUT LONG-TERM CURRENT USE OF INSULIN (H): ICD-10-CM

## 2023-08-07 RX ORDER — TIRZEPATIDE 10 MG/.5ML
10 INJECTION, SOLUTION SUBCUTANEOUS
Qty: 2 ML | Refills: 0 | Status: SHIPPED | OUTPATIENT
Start: 2023-08-07 | End: 2023-08-14 | Stop reason: DRUGHIGH

## 2023-08-10 NOTE — PROGRESS NOTES
Medication Therapy Management (MTM) Encounter    ASSESSMENT:                            Medication Adherence/Access: No issues identified    Type 2 Diabetes/Weight Management:  Patient is not meeting A1c goal of < 7%. Self monitoring of blood glucose is at goal of fasting  mg/dL. Patient would benefit from increasing Mounjaro and decreasing insulin by 20% initially and then in 2 weeks with future consideration of discontinuing. Due for annual eye exam.  Due for hemoglobin A1C this fall.  Discussed potentially stopping phentermine in the future, consider next visit     Depression/Anxiety: stable    PLAN:                            Weight Management/Diabetes:    Increase Mounjaro 12.5mg once weekly  2.    Levemir 12 units once daily, reduce to 10 units if your blood sugars are <70 mg/dL or 2 weeks after starting Mounjaro 12.5mg once weekly  3.    Eye exam due, please schedule this  4.    Check hemoglobin A1C before your visit on 10/27/23, go to lab first on 1st floor    Consider stopping phentermine next visit.    Follow-up: Return in about 2 months (around 10/27/2023) for MTM Pharmacist Visit, Lab Work.    Note for next visit the continuous glucose monitor may be under maiden name of Jennifer    SUBJECTIVE/OBJECTIVE:                          Geni Jin is a 42 year old female contacted via secure video for a follow-up visit from 6/7/2023 with Jayleen Ha, PharmD.       Reason for visit: would like to increase the Mounjaro from 10mg to the next step.    Allergies/ADRs: Reviewed in chart  Past Medical History: Reviewed in chart  Tobacco: She reports that she has never smoked. She has never used smokeless tobacco.  Alcohol: Reviewed in chart     Medication Adherence/Access:   No issues reported  The patient fills medications at [facility] in [place].   Buproprion to [facility]   Cost of Mounjaro has been affordable/covered      Type 2 Diabetes/Obesity:    Metformin XR 2000 mg daily with  dinner  Levemir 15 units at bedtime   Mounjaro 10 mg once weekly for about 2 months  Phentermine 37.5 mg every morning   Bupropion  mg once daily - see below   Patient reports no concerns with side effects.  Diarrhea once in awhile  She would like to cut back/get off insulin as we titrate Mounjaro in future      Baseline weight pre Mounjaro: 296lb  274lb today    Reports she is watching what she is eating.  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  She is trying to get pregnant in the future but not currently. Previously lost 20 lbs with Phentermine and Ozempic    Previous Medications:   Ozempic 1 MG - no noticeable weight loss or A1C lowering ; stopped in lieu of Mounjaro      Blood sugar monitoring: Continuous Glucose Monitor - Jas[employer]ir 3. Connected to [facility]. She is not currently using, pharmacy had been out of them     Glucometer ranges from memory, does not have them with her today  Getting  , 100 when she wakes up and during the day goes up with eating to 105    Symptoms of low blood sugar? yes with continuous glucose monitor twice when she was going to bed, 79  Symptoms of high blood sugar? None  Eye exam: due  Foot exam: up to date   Aspirin: Not taking due to age  Statin: Yes: Atorvastatin 20 mg daily, started ~2 months ago. No reported side effects  ACEi/ARB: No    Urine Albumin:   Lab Results   Component Value Date    UCRR 228.0 02/01/2023    MICROL 56.8 02/01/2023    UMALCR 24.91 02/01/2023     Lab Results   Component Value Date    A1C 7.5 05/08/2023    A1C 6.5 02/01/2023    A1C 6.5 09/30/2022    A1C 6.1 04/11/2022    A1C 10.6 01/10/2022       Wt Readings from Last 4 Encounters:   05/08/23 291 lb 11.2 oz (132.3 kg)   04/05/23 296 lb (134.3 kg)   02/01/23 300 lb (136.1 kg)   10/28/22 311 lb (141.1 kg)      Depression/Anxiety:     Escitalopram 30 mg once daily.   Bupropion  mg daily   No current side effects.      The patient feels like for the most part things are doing well with this and wants to continue current regimen     Previously, has tried Zoloft & Celexa. Escitalopram has been the best option so far.   No personal or family history of seizures.          6/19/2022     6:57 PM 8/31/2022    10:44 AM 2/1/2023    12:35 PM   PHQ-9 SCORE   PHQ-9 Total Score MyChart 9 (Mild depression) 5 (Mild depression) 4 (Minimal depression)   PHQ-9 Total Score 9 5 4           7/2/2020     3:00 PM 6/17/2021     9:00 AM 1/10/2022    12:58 PM   AMANDA-7 SCORE   Total Score 11 16 2      Today's Vitals: There were no vitals taken for this visit.  ----------------      I spent 21 minutes with this patient today. All changes were made via collaborative practice agreement with CHUY Monge CNP. A copy of the visit note was provided to the patient's provider(s).    A summary of these recommendations was given to the patient.    Kaylyn Cuenca, PharmD [facility]  Medication Therapy Management Practitioner  Voicemail #[phone]     Telemedicine Visit Details  Type of service:  Telephone visit  Start Time:  10:36am  End Time:  10:57am     Medication Therapy Recommendations  Type 2 diabetes mellitus without complication, without long-term current use of insulin (H)    Current Medication: tirzepatide (MOUNJARO) 10 MG/0.5ML pen (Discontinued)   Rationale: Dose too low - Dosage too low - Effectiveness   Recommendation: Increase Dose - decrease insulin   Status: Accepted per CPA

## 2023-08-14 ENCOUNTER — VIRTUAL VISIT (OUTPATIENT)
Dept: PHARMACY | Facility: CLINIC | Age: 43
End: 2023-08-14
Payer: COMMERCIAL

## 2023-08-14 DIAGNOSIS — F41.1 GAD (GENERALIZED ANXIETY DISORDER): ICD-10-CM

## 2023-08-14 DIAGNOSIS — E66.01 MORBID OBESITY (H): ICD-10-CM

## 2023-08-14 DIAGNOSIS — E11.9 TYPE 2 DIABETES MELLITUS WITHOUT COMPLICATION, WITHOUT LONG-TERM CURRENT USE OF INSULIN (H): Primary | ICD-10-CM

## 2023-08-14 DIAGNOSIS — F33.0 MILD RECURRENT MAJOR DEPRESSION (H): ICD-10-CM

## 2023-08-14 PROCEDURE — 99207 PR NO CHARGE LOS: CPT | Performed by: PHARMACIST

## 2023-08-14 RX ORDER — TIRZEPATIDE 12.5 MG/.5ML
12.5 INJECTION, SOLUTION SUBCUTANEOUS
Qty: 2 ML | Refills: 1 | Status: SHIPPED | OUTPATIENT
Start: 2023-08-14 | End: 2023-10-18

## 2023-08-14 RX ORDER — LACTOBACILLUS RHAMNOSUS GG 10B CELL
1 CAPSULE ORAL DAILY
COMMUNITY

## 2023-08-14 NOTE — PATIENT INSTRUCTIONS
Recommendations from today's MTM visit:                                                      Weight Management/Diabetes:    Increase Mounjaro 12.5mg once weekly  2.    Levemir 12 units once daily, reduce to 10 units if your blood sugars are <70 mg/dL or 2 weeks after starting Mounjaro 12.5mg once weekly  3.    Eye exam due, please schedule this  4.    Check hemoglobin A1C before your visit on 10/27/23, go to lab first on 1st floor    Follow-up: Return in about 2 months (around 10/27/2023) for MTM Pharmacist Visit, Lab Work.    To schedule another MTM appointment, please call the clinic directly or you may call the MTM scheduling line at [phone] or toll-free at [phone].     My Clinical Pharmacist's contact information:                                                      It was a pleasure seeing you today!  Please feel free to contact me with any questions or concerns you have.      Kaylyn Cuenca, PharmD [facility]  Medication Therapy Management Practitioner  Voicemail #[phone]     It was great to speak with you today.  I value your experience and would be very thankful for your time with providing feedback on our clinic survey. You may receive a survey via email or text message in the next few days.

## 2023-08-22 DIAGNOSIS — E66.01 MORBID OBESITY (H): ICD-10-CM

## 2023-08-22 DIAGNOSIS — G43.009 MIGRAINE WITHOUT AURA AND WITHOUT STATUS MIGRAINOSUS, NOT INTRACTABLE: ICD-10-CM

## 2023-08-23 RX ORDER — TRAMADOL HYDROCHLORIDE 50 MG/1
50 TABLET ORAL EVERY 6 HOURS PRN
Qty: 30 TABLET | Refills: 0 | Status: SHIPPED | OUTPATIENT
Start: 2023-08-23 | End: 2024-04-15

## 2023-08-23 RX ORDER — TRAMADOL HYDROCHLORIDE 50 MG/1
TABLET ORAL
Qty: 30 TABLET | OUTPATIENT
Start: 2023-08-23

## 2023-08-23 RX ORDER — PHENTERMINE HYDROCHLORIDE 37.5 MG/1
37.5 CAPSULE ORAL EVERY MORNING
Qty: 30 CAPSULE | Refills: 0 | Status: SHIPPED | OUTPATIENT
Start: 2023-08-23 | End: 2023-09-16

## 2023-09-16 DIAGNOSIS — E66.01 MORBID OBESITY (H): ICD-10-CM

## 2023-09-17 RX ORDER — PHENTERMINE HYDROCHLORIDE 37.5 MG/1
37.5 CAPSULE ORAL EVERY MORNING
Qty: 30 CAPSULE | Refills: 1 | Status: SHIPPED | OUTPATIENT
Start: 2023-09-17 | End: 2023-10-03

## 2023-09-22 ENCOUNTER — IMMUNIZATION (OUTPATIENT)
Dept: PEDIATRICS | Facility: CLINIC | Age: 43
End: 2023-09-22
Payer: COMMERCIAL

## 2023-09-22 DIAGNOSIS — Z23 NEED FOR PROPHYLACTIC VACCINATION AND INOCULATION AGAINST INFLUENZA: Primary | ICD-10-CM

## 2023-09-22 PROCEDURE — 90471 IMMUNIZATION ADMIN: CPT

## 2023-09-22 PROCEDURE — 90686 IIV4 VACC NO PRSV 0.5 ML IM: CPT

## 2023-09-22 PROCEDURE — 99207 PR NO CHARGE NURSE ONLY: CPT

## 2023-10-03 DIAGNOSIS — E66.01 MORBID OBESITY (H): ICD-10-CM

## 2023-10-05 DIAGNOSIS — E66.01 MORBID OBESITY (H): ICD-10-CM

## 2023-10-05 RX ORDER — PHENTERMINE HYDROCHLORIDE 37.5 MG/1
37.5 CAPSULE ORAL EVERY MORNING
Qty: 30 CAPSULE | Refills: 1 | Status: SHIPPED | OUTPATIENT
Start: 2023-10-05 | End: 2023-12-01

## 2023-10-05 RX ORDER — PHENTERMINE HYDROCHLORIDE 37.5 MG/1
37.5 CAPSULE ORAL EVERY MORNING
Qty: 30 CAPSULE | Refills: 1 | OUTPATIENT
Start: 2023-10-05

## 2023-10-05 NOTE — TELEPHONE ENCOUNTER
Routing refill request to provider for review/approval because:  Has refill on file, wants filled at [facility] (originally sent to [facility])  Jinny Carrillo RN, BSN  [facility]

## 2023-10-15 ENCOUNTER — HEALTH MAINTENANCE LETTER (OUTPATIENT)
Age: 43
End: 2023-10-15

## 2023-10-16 ENCOUNTER — DOCUMENTATION ONLY (OUTPATIENT)
Dept: LAB | Facility: CLINIC | Age: 43
End: 2023-10-16
Payer: COMMERCIAL

## 2023-10-16 NOTE — PROGRESS NOTES
Ambar Jin has an upcoming lab appointment:    Future Appointments   Date Time Provider Department [place]   10/27/2023 11:00 AM EA LAB [facility]    10/27/2023 11:30 AM Olga Louis RP EAMTM    11/21/2023  1:00 PM Terwilliger, Paula CCCARMELO [facility]   11/28/2023  9:00 AM Terwilliger, Paula CCCARMELO [facility]   12/1/2023 10:30 AM Ira Marcus APRN CNP EAFP      Patient is scheduled for the following lab(s):   Scheduling Notes: HA1C   Made On:  Change Notes: 8/14/2023 10:57 AM  8/14/2023 10:57 AM By:  By: HEIDY MAYS NICOLE L       There is no order available. Please review and place either future orders or HMPO (Review of Health Maintenance Protocol Orders), as appropriate.    Health Maintenance Due   Topic    ANNUAL REVIEW OF HM ORDERS     A1C Manny Gupta

## 2023-10-17 ENCOUNTER — HOSPITAL ENCOUNTER (EMERGENCY)
Facility: CLINIC | Age: 43
Discharge: HOME | End: 2023-10-17
Attending: EMERGENCY MEDICINE | Admitting: EMERGENCY MEDICINE
Payer: COMMERCIAL

## 2023-10-17 VITALS
OXYGEN SATURATION: 99 % | WEIGHT: 251 LBS | DIASTOLIC BLOOD PRESSURE: 71 MMHG | HEART RATE: 74 BPM | RESPIRATION RATE: 18 BRPM | TEMPERATURE: 98.1 F | SYSTOLIC BLOOD PRESSURE: 149 MMHG | HEIGHT: 69 IN | BODY MASS INDEX: 37.18 KG/M2

## 2023-10-17 DIAGNOSIS — R11.2 NAUSEA AND VOMITING, UNSPECIFIED VOMITING TYPE: ICD-10-CM

## 2023-10-17 DIAGNOSIS — R10.13 ABDOMINAL PAIN, EPIGASTRIC: ICD-10-CM

## 2023-10-17 LAB
ALBUMIN SERPL BCG-MCNC: 4.3 G/DL (ref 3.5–5.2)
ALBUMIN UR-MCNC: NEGATIVE MG/DL
ALP SERPL-CCNC: 78 U/L (ref 35–104)
ALT SERPL W P-5'-P-CCNC: 12 U/L (ref 0–50)
ANION GAP SERPL CALCULATED.3IONS-SCNC: 14 MMOL/L (ref 7–15)
ANION GAP SERPL CALCULATED.3IONS-SCNC: 19 MMOL/L (ref 7–15)
APPEARANCE UR: CLEAR
AST SERPL W P-5'-P-CCNC: 23 U/L (ref 0–45)
BASO+EOS+MONOS # BLD AUTO: ABNORMAL 10*3/UL
BASO+EOS+MONOS NFR BLD AUTO: ABNORMAL %
BASOPHILS # BLD AUTO: 0.1 10E3/UL (ref 0–0.2)
BASOPHILS NFR BLD AUTO: 1 %
BILIRUB SERPL-MCNC: 0.4 MG/DL
BILIRUB UR QL STRIP: NEGATIVE
BUN SERPL-MCNC: 12.7 MG/DL (ref 6–20)
BUN SERPL-MCNC: 14.7 MG/DL (ref 6–20)
CALCIUM SERPL-MCNC: 8.2 MG/DL (ref 8.6–10)
CALCIUM SERPL-MCNC: 9.2 MG/DL (ref 8.6–10)
CHLORIDE SERPL-SCNC: 102 MMOL/L (ref 98–107)
CHLORIDE SERPL-SCNC: 104 MMOL/L (ref 98–107)
COLOR UR AUTO: ABNORMAL
CREAT SERPL-MCNC: 0.56 MG/DL (ref 0.51–0.95)
CREAT SERPL-MCNC: 0.58 MG/DL (ref 0.51–0.95)
DEPRECATED HCO3 PLAS-SCNC: 18 MMOL/L (ref 22–29)
DEPRECATED HCO3 PLAS-SCNC: 22 MMOL/L (ref 22–29)
EGFRCR SERPLBLD CKD-EPI 2021: >90 ML/MIN/1.73M2
EGFRCR SERPLBLD CKD-EPI 2021: >90 ML/MIN/1.73M2
EOSINOPHIL # BLD AUTO: 0.1 10E3/UL (ref 0–0.7)
EOSINOPHIL NFR BLD AUTO: 1 %
ERYTHROCYTE [DISTWIDTH] IN BLOOD BY AUTOMATED COUNT: 15.9 % (ref 10–15)
GLUCOSE SERPL-MCNC: 111 MG/DL (ref 70–99)
GLUCOSE SERPL-MCNC: 140 MG/DL (ref 70–99)
GLUCOSE UR STRIP-MCNC: NEGATIVE MG/DL
HCT VFR BLD AUTO: 36.1 % (ref 35–47)
HGB BLD-MCNC: 11.7 G/DL (ref 11.7–15.7)
HGB UR QL STRIP: NEGATIVE
IMM GRANULOCYTES # BLD: 0 10E3/UL
IMM GRANULOCYTES NFR BLD: 0 %
KETONES UR STRIP-MCNC: 100 MG/DL
LEUKOCYTE ESTERASE UR QL STRIP: NEGATIVE
LIPASE SERPL-CCNC: 54 U/L (ref 13–60)
LYMPHOCYTES # BLD AUTO: 1.8 10E3/UL (ref 0.8–5.3)
LYMPHOCYTES NFR BLD AUTO: 13 %
MCH RBC QN AUTO: 24.4 PG (ref 26.5–33)
MCHC RBC AUTO-ENTMCNC: 32.4 G/DL (ref 31.5–36.5)
MCV RBC AUTO: 75 FL (ref 78–100)
MONOCYTES # BLD AUTO: 0.7 10E3/UL (ref 0–1.3)
MONOCYTES NFR BLD AUTO: 5 %
MUCOUS THREADS #/AREA URNS LPF: PRESENT /LPF
NEUTROPHILS # BLD AUTO: 11.1 10E3/UL (ref 1.6–8.3)
NEUTROPHILS NFR BLD AUTO: 80 %
NITRATE UR QL: NEGATIVE
NRBC # BLD AUTO: 0 10E3/UL
NRBC BLD AUTO-RTO: 0 /100
PH UR STRIP: 5.5 [PH] (ref 5–7)
PLATELET # BLD AUTO: 470 10E3/UL (ref 150–450)
POTASSIUM SERPL-SCNC: 3.6 MMOL/L (ref 3.4–5.3)
POTASSIUM SERPL-SCNC: 3.8 MMOL/L (ref 3.4–5.3)
PROT SERPL-MCNC: 7.6 G/DL (ref 6.4–8.3)
RBC # BLD AUTO: 4.79 10E6/UL (ref 3.8–5.2)
RBC URINE: <1 /HPF
SODIUM SERPL-SCNC: 139 MMOL/L (ref 135–145)
SODIUM SERPL-SCNC: 140 MMOL/L (ref 135–145)
SP GR UR STRIP: 1.02 (ref 1–1.03)
UROBILINOGEN UR STRIP-MCNC: <2 MG/DL
WBC # BLD AUTO: 13.8 10E3/UL (ref 4–11)
WBC URINE: 1 /HPF

## 2023-10-17 PROCEDURE — 36415 COLL VENOUS BLD VENIPUNCTURE: CPT | Performed by: EMERGENCY MEDICINE

## 2023-10-17 PROCEDURE — 96375 TX/PRO/DX INJ NEW DRUG ADDON: CPT

## 2023-10-17 PROCEDURE — 80053 COMPREHEN METABOLIC PANEL: CPT | Performed by: EMERGENCY MEDICINE

## 2023-10-17 PROCEDURE — 81001 URINALYSIS AUTO W/SCOPE: CPT | Performed by: EMERGENCY MEDICINE

## 2023-10-17 PROCEDURE — 85025 COMPLETE CBC W/AUTO DIFF WBC: CPT | Performed by: EMERGENCY MEDICINE

## 2023-10-17 PROCEDURE — 258N000003 HC RX IP 258 OP 636: Performed by: EMERGENCY MEDICINE

## 2023-10-17 PROCEDURE — 96376 TX/PRO/DX INJ SAME DRUG ADON: CPT

## 2023-10-17 PROCEDURE — 99284 EMERGENCY DEPT VISIT MOD MDM: CPT | Mod: 25

## 2023-10-17 PROCEDURE — 83690 ASSAY OF LIPASE: CPT | Performed by: EMERGENCY MEDICINE

## 2023-10-17 PROCEDURE — 96374 THER/PROPH/DIAG INJ IV PUSH: CPT

## 2023-10-17 PROCEDURE — 96361 HYDRATE IV INFUSION ADD-ON: CPT

## 2023-10-17 PROCEDURE — 250N000011 HC RX IP 250 OP 636: Mod: JZ | Performed by: EMERGENCY MEDICINE

## 2023-10-17 RX ORDER — DROPERIDOL 2.5 MG/ML
0.62 INJECTION, SOLUTION INTRAMUSCULAR; INTRAVENOUS ONCE
Status: COMPLETED | OUTPATIENT
Start: 2023-10-17 | End: 2023-10-17

## 2023-10-17 RX ORDER — PROMETHAZINE HYDROCHLORIDE 25 MG/1
25 SUPPOSITORY RECTAL EVERY 6 HOURS PRN
Qty: 12 SUPPOSITORY | Refills: 0 | Status: SHIPPED | OUTPATIENT
Start: 2023-10-17 | End: 2023-12-01

## 2023-10-17 RX ORDER — DROPERIDOL 2.5 MG/ML
1.25 INJECTION, SOLUTION INTRAMUSCULAR; INTRAVENOUS ONCE
Status: COMPLETED | OUTPATIENT
Start: 2023-10-17 | End: 2023-10-17

## 2023-10-17 RX ADMIN — FAMOTIDINE 20 MG: 10 INJECTION INTRAVENOUS at 09:03

## 2023-10-17 RX ADMIN — DROPERIDOL 1.25 MG: 2.5 INJECTION, SOLUTION INTRAMUSCULAR; INTRAVENOUS at 06:59

## 2023-10-17 RX ADMIN — DROPERIDOL 0.62 MG: 2.5 INJECTION, SOLUTION INTRAMUSCULAR; INTRAVENOUS at 09:00

## 2023-10-17 RX ADMIN — SODIUM CHLORIDE 1000 ML: 9 INJECTION, SOLUTION INTRAVENOUS at 06:59

## 2023-10-17 RX ADMIN — SODIUM CHLORIDE 1000 ML: 9 INJECTION, SOLUTION INTRAVENOUS at 07:58

## 2023-10-17 ASSESSMENT — ACTIVITIES OF DAILY LIVING (ADL)
ADLS_ACUITY_SCORE: 35
ADLS_ACUITY_SCORE: 35

## 2023-10-17 NOTE — ED TRIAGE NOTES
Pts pain started Sunday morning. Pt went to the urgency room yesterday and diagnosed with a ovarian cyst. Pt states she got some zofran there and than went home went to bed, pt woke up with severe pain in the epigastric region and started vomiting. Pt tried to take ibuprofen but vomited it up.       Triage Assessment (Adult)       Row Name 10/17/23 0633          Triage Assessment    Airway WDL WDL        Respiratory WDL    Respiratory WDL WDL        Skin Circulation/Temperature WDL    Skin Circulation/Temperature WDL WDL        Cardiac WDL    Cardiac WDL WDL        Peripheral/Neurovascular WDL    Peripheral Neurovascular WDL WDL        Cognitive/Neuro/Behavioral WDL    Cognitive/Neuro/Behavioral WDL WDL

## 2023-10-17 NOTE — DISCHARGE INSTRUCTIONS
You can continue to use your previously prescribed Zofran as needed for nausea and vomiting.  Have also given you a prescription for a second nausea medication, a suppository that you can use as well.  Your blood work showed mild dehydration today but this normalized after fluids.  I wonder if the symptoms could be related to cannabis hyperemesis syndrome, so I am giving you some information to review for this regarding cannabis Hyperemesis syndrome.  Consider cutting back on use.

## 2023-10-17 NOTE — ED PROVIDER NOTES
EMERGENCY DEPARTMENT ENCOUNTER      NAME: Ambar Jin  AGE: 42 year old female  YOB: 1980  MRN: [id number]  EVALUATION DATE & TIME: 10/17/2023  6:31 AM    PCP: Ira Marcus    ED PROVIDER: Liliana Ibrahim MD    Chief Complaint   Patient presents with    Abdominal Pain         FINAL IMPRESSION:  1. Nausea and vomiting, unspecified vomiting type    2. Abdominal pain, epigastric          ED COURSE & MEDICAL DECISION MAKING:    Pertinent Labs & Imaging studies reviewed. (See chart for details)  42 year old female with history of depression anxiety, PCOS, DM, asthma with previous cholecystectomy who presents to the Emergency Department for evaluation of 2 days of epigastric abdominal pain, nausea vomiting.  Reproducible epigastric tenderness on examination.  Patient was seen emergency room yesterday and had normal LFTs and lipase.  Certainly we will repeat these today to see if any pancreatitis or hepatobiliary pathology is evolving but I suspect this is less likely.  Certainly gastritis is on the differential but she denies any longstanding history of GERD, postprandial symptomatology.  She uses cannabis Gummies multiple times per week.  Certainly cannabis hyperemesis her cyclical vomiting syndrome is on the differential.  Yesterday at the urgency room she was diagnosed with a right-sided hemorrhagic ovarian cyst.  She has absolutely no lower abdominal, pelvic pain nor did she yesterday.  I think that this cyst is incidental and I do not think that this represents ovarian torsion.  She had CT imaging yesterday of her abdomen and pelvis and no signs of appendicitis or bowel obstruction, and I do not think this warrants repeat today.  Pregnancy test yesterday negative.  Urinalysis yesterday emergency room, unable to interpret due to vaginal bleeding from her menses, so we will repeat today to rule out UTI, though she did really denies any symptoms.    Patient placed on monitor, IV established and  blood obtained.  Given 1 L normal saline bolus, 1.25 mg droperidol.  CBC, CMP, lipase notable for mild leukocytosis of 13.8 and thrombocytosis of 470.  These are not significantly changed from yesterday.  Anion gap acidosis with bicarb of 18 and gap of 19 versus 22/16 yesterday respectively.  Patient given second liter normal saline bolus, and plan will be to repeat electrolytes after the above to make sure gap is normalized.  However on repeat assessment patient is already feeling improved after the droperidol and 1 L bolus and would like to start oral challenge.  Urinalysis unremarkable.  Repeat BMP has improved with closed anion gap acidosis.  Was able to tolerate oral challenge and will be discharged home with continuing of her home Zofran and was also given prescription for Phenergan suppositories and counseled cannabis use.      ED Course as of 10/17/23 1007   Tue Oct 17, 2023   0628 I met with the patient to gather history and to perform my initial exam. We discussed plans for the ED course, including diagnostic testing and treatment.      0709 WBC(!): 13.8  14.0 yesterday   0709 Platelet Count(!): 470  425 yesterday   0715 Carbon Dioxide (CO2)(!): 18  22 yesterday   0715 Anion Gap(!): 19  16 yesterday   0752 Patient updated, feeling improved and would like to start oral challenge   0858 Second liter fluid feeling and, nausea is returning we will redose droperidol   0927 Basic metabolic panel(!)  AGMA closed       Medical Decision Making    History:  Supplemental history from: Family Member/Significant Other  External Record(s) reviewed: Outpatient Record: Emergency room record yesterday with note, labs and imaging    Work Up:  Chart documentation includes differential considered and any EKGs or imaging independently interpreted by provider, see MDM  In additional to work up documented, I considered the following work up: see MDM    External consultation:  Discussion of management with another provider:  N/A    Complicating factors:  Care impacted by chronic illness: Diabetes and Mental Health  Care affected by social determinants of health: Access to Medical Care night shift no access to PCP    Disposition considerations: Discharge. I prescribed additional prescription strength medication(s) as charted. See documentation for any additional details.        At the conclusion of the encounter I discussed the results of all of the tests and the disposition. The questions were answered. The patient or family acknowledged understanding and was agreeable with the care plan.      MEDICATIONS GIVEN IN THE EMERGENCY:  Medications   sodium chloride 0.9% BOLUS 1,000 mL (0 mLs Intravenous Stopped 10/17/23 0855)   droPERidol (INAPSINE) injection 1.25 mg (1.25 mg Intravenous $Given 10/17/23 0659)   sodium chloride 0.9% BOLUS 1,000 mL (0 mLs Intravenous Stopped 10/17/23 0855)   droPERidol (INAPSINE) injection 0.625 mg (0.625 mg Intravenous $Given 10/17/23 0900)   famotidine (PEPCID) injection 20 mg (20 mg Intravenous $Given 10/17/23 0903)       NEW PRESCRIPTIONS STARTED AT TODAY'S ER VISIT  New Prescriptions    PROMETHAZINE (PHENERGAN) 25 MG SUPPOSITORY    Place 1 suppository (25 mg) rectally every 6 hours as needed for nausea          =================================================================    HPI    Patient information was obtained from: Patient     Use of Intrepreter: N/A     Ambar Jin is a 42 year old female with pertinent medical history of DM2, anxietym who presents with abdominal pain.     Per chart review, patient was sen at the Urgency Room on 10/16/23 for vomiting and vaginal order. CT imaging showed 8.1cm right sided cyst and ultrasound showed 7.0cm right hemorrhagic ovarian cyst. Wet prep negative. Minimally acidotic. White count 14. Urine unable to be interpreted due to interfering substance, no concern for STD. Of note, patient was concerned for a retained tampon from her last menstrual cycle.  "    Patient repots 10/15/23 she developed nausea, epigastric abdominal pain, and decreased appetite. She was seen at Urgent Care on 10/16/23 where she was found to have ovarian cysts which she does not think is contributing to her pain due to location. Given nausea medication and discharged home in significant relief. This morning she awoke with more severe epigastric abdominal pain and vomiting. No history of ulcers or GERD. Menstrual cycle started 10/14/23. Takes metformin and lexapro daily. Of note, patient takes marijuana gummies \"a few times a week\" for sleep. No history of hyperemesis.     Denies diarrhea, back pain, dysuria, hematuria.       PAST MEDICAL HISTORY:  Past Medical History:   Diagnosis Date    Anxiety     Asthma     Depression     Diabetes mellitus, type 2 (H)     PCOS (polycystic ovarian syndrome)        PAST SURGICAL HISTORY:  Past Surgical History:   Procedure Laterality Date    CHOLECYSTECTOMY      FOOT FRACTURE SURGERY Left     x3    FOOT FRACTURE SURGERY Left        CURRENT MEDICATIONS:    Prior to Admission Medications   Prescriptions Last Dose Informant Patient Reported? Taking?   BD DONNELL U/F 32G X 4 MM insulin pen needle   No No   Sig: USE AS DIRECTED FOR INSULIN PEN   Continuous Blood Gluc Sensor (FREESTYLE DOROTHY 3 SENSOR) [facility]   No No   Si each every 14 days   albuterol (PROAIR HFA/PROVENTIL HFA/VENTOLIN HFA) 108 (90 Base) MCG/ACT inhaler   No No   Sig: Inhale 2 puffs into the lungs every 6 hours   atorvastatin (LIPITOR) 20 MG tablet   No No   Sig: TAKE 1 TABLET(20 MG) BY MOUTH DAILY   buPROPion (WELLBUTRIN XL) 300 MG 24 hr tablet   No No   Sig: Take 1 tablet (300 mg) by mouth every morning   cetirizine HCl (ZYRTEC ORAL)   Yes No   Sig: Take 10 mg by mouth daily   cholecalciferol, vitamin D3, (VITAMIN D3 ORAL)   Yes No   Sig: Take 2,000 Units by mouth daily   escitalopram (LEXAPRO) 10 MG tablet   No No   Sig: TAKE ONE TABLET BY MOUTH DAILY ALONG WITH 20MG TABLET   escitalopram " (LEXAPRO) 20 MG tablet   No No   Sig: TAKE 1 TABLET DAILY   ibuprofen (ADVIL/MOTRIN) 200 MG tablet   Yes No   Sig: Take 600-800 mg by mouth daily as needed HOLD.   insulin detemir (LEVEMIR PEN) 100 UNIT/ML pen   No No   Sig: Inject 15 Units Subcutaneous At Bedtime   lactobacillus rhamnosus, GG, (CULTURELL) capsule   Yes No   Sig: Take 1 capsule by mouth daily   metFORMIN (GLUCOPHAGE XR) 500 MG 24 hr tablet   No No   Sig: Take 4 tablets (2,000 mg) by mouth daily (with dinner)   phentermine (ADIPEX-P) 37.5 MG capsule   No No   Sig: Take 1 capsule (37.5 mg) by mouth every morning   tirzepatide (MOUNJARO) 12.5 MG/0.5ML pen   No No   Sig: Inject 12.5 mg Subcutaneous every 7 days   traMADol (ULTRAM) 50 MG tablet   No No   Sig: Take 1 tablet (50 mg) by mouth every 6 hours as needed (migraine)      Facility-Administered Medications: None       ALLERGIES:  Allergies   Allergen Reactions    Alprazolam Other (See Comments)     Irritable, no memory of what happened    Clonazepam Dizziness     No memory of what happened while on medication       FAMILY HISTORY:  Family History   Problem Relation Age of Onset    Diabetes Type 2  Mother     Depression Mother     Arthritis Mother     Cerebrovascular Disease Mother 60.00    Diabetes Type 2  Father     Depression Father     Early Death Sister         rare blood disorder    Lung Cancer Maternal Grandmother     Cerebrovascular Disease Maternal Grandmother     Throat cancer Maternal Grandfather     Diabetes Maternal Grandfather     Prostate Cancer Paternal Grandfather     Depression Sister     Asthma Sister     Liver Disease Sister         Primary biliary cholangitis        SOCIAL HISTORY:  Social History     Tobacco Use    Smoking status: Never    Smokeless tobacco: Never   Vaping Use    Vaping Use: Never used   Substance Use Topics    Alcohol use: Yes     Comment: twice per month     Drug use: Never        VITALS:  Patient Vitals for the past 24 hrs:   BP Temp Temp src Pulse Resp SpO2  "Height Weight   10/17/23 0632 (!) 183/88 98.1  F (36.7  C) Oral 78 20 98 % 1.753 m (5' 9\") 113.9 kg (251 lb)       PHYSICAL EXAM    General Appearance: Well-appearing, well-nourished. Restless, uncomfortable appearing.   Head:  Normocephalic, atraumatic  Eyes:  PERRL, conjunctiva/corneas clear, EOM's intact  ENT:  Lips, mucosa, and tongue normal; membranes are moist without pallor  Neck:  Supple, symmetrical, trachea midline, no adenopathy  Chest:  No tenderness or deformity, no crepitus  Cardio:  Regular rate and rhythm, no murmur/gallop/rub, 2+ pulses symmetric in all extremities  Pulm:  No respiratory distress, clear to auscultation bilaterally  Back:  No midline tenderness to palpation, no paraspinal tenderness, No CVA tenderness, normal ROM  Abdomen:  Soft, non distended,no rebound or guarding. Epigastric abdominal pain, obese.   Extremities:  Extremities normal, atraumatic, no cyanosis or edema, full ROM and motor tone intact, bilateral pulses intact upper and lower  Skin:  Skin warm, dry, no rashes  Neuro:  Alert and oriented ×3, moving all extremities, no gross sensory defects     RADIOLOGY/LABS:  Reviewed all pertinent imaging. Please see official radiology report. All pertinent labs reviewed and interpreted.    Results for orders placed or performed during the hospital encounter of 10/17/23   Comprehensive metabolic panel   Result Value Ref Range    Sodium 139 135 - 145 mmol/L    Potassium 3.6 3.4 - 5.3 mmol/L    Carbon Dioxide (CO2) 18 (L) 22 - 29 mmol/L    Anion Gap 19 (H) 7 - 15 mmol/L    Urea Nitrogen 14.7 6.0 - 20.0 mg/dL    Creatinine 0.58 0.51 - 0.95 mg/dL    GFR Estimate >90 >60 mL/min/1.73m2    Calcium 9.2 8.6 - 10.0 mg/dL    Chloride 102 98 - 107 mmol/L    Glucose 140 (H) 70 - 99 mg/dL    Alkaline Phosphatase 78 35 - 104 U/L    AST 23 0 - 45 U/L    ALT 12 0 - 50 U/L    Protein Total 7.6 6.4 - 8.3 g/dL    Albumin 4.3 3.5 - 5.2 g/dL    Bilirubin Total 0.4 <=1.2 mg/dL   Result Value Ref Range    " Lipase 54 13 - 60 U/L   UA with Microscopic reflex to Culture    Specimen: Urine, Clean Catch   Result Value Ref Range    Color Urine Light Yellow Colorless, Straw, Light Yellow, Yellow    Appearance Urine Clear Clear    Glucose Urine Negative Negative mg/dL    Bilirubin Urine Negative Negative    Ketones Urine 100 (A) Negative mg/dL    Specific Gravity Urine 1.025 1.001 - 1.030    Blood Urine Negative Negative    pH Urine 5.5 5.0 - 7.0    Protein Albumin Urine Negative Negative mg/dL    Urobilinogen Urine <2.0 <2.0 mg/dL    Nitrite Urine Negative Negative    Leukocyte Esterase Urine Negative Negative    Mucus Urine Present (A) None Seen /LPF    RBC Urine <1 <=2 /HPF    WBC Urine 1 <=5 /HPF   CBC with platelets and differential   Result Value Ref Range    WBC Count 13.8 (H) 4.0 - 11.0 10e3/uL    RBC Count 4.79 3.80 - 5.20 10e6/uL    Hemoglobin 11.7 11.7 - 15.7 g/dL    Hematocrit 36.1 35.0 - 47.0 %    MCV 75 (L) 78 - 100 fL    MCH 24.4 (L) 26.5 - 33.0 pg    MCHC 32.4 31.5 - 36.5 g/dL    RDW 15.9 (H) 10.0 - 15.0 %    Platelet Count 470 (H) 150 - 450 10e3/uL    % Neutrophils 80 %    % Lymphocytes 13 %    % Monocytes 5 %    Mids % (Monos, Eos, Basos)      % Eosinophils 1 %    % Basophils 1 %    % Immature Granulocytes 0 %    NRBCs per 100 WBC 0 <1 /100    Absolute Neutrophils 11.1 (H) 1.6 - 8.3 10e3/uL    Absolute Lymphocytes 1.8 0.8 - 5.3 10e3/uL    Absolute Monocytes 0.7 0.0 - 1.3 10e3/uL    Mids Abs (Monos, Eos, Basos)      Absolute Eosinophils 0.1 0.0 - 0.7 10e3/uL    Absolute Basophils 0.1 0.0 - 0.2 10e3/uL    Absolute Immature Granulocytes 0.0 <=0.4 10e3/uL    Absolute NRBCs 0.0 10e3/uL   Basic metabolic panel   Result Value Ref Range    Sodium 140 135 - 145 mmol/L    Potassium 3.8 3.4 - 5.3 mmol/L    Chloride 104 98 - 107 mmol/L    Carbon Dioxide (CO2) 22 22 - 29 mmol/L    Anion Gap 14 7 - 15 mmol/L    Urea Nitrogen 12.7 6.0 - 20.0 mg/dL    Creatinine 0.56 0.51 - 0.95 mg/dL    GFR Estimate >90 >60 mL/min/1.73m2     Calcium 8.2 (L) 8.6 - 10.0 mg/dL    Glucose 111 (H) 70 - 99 mg/dL       The creation of this record is based on the scribe s observations of the work being performed by Liliana Ibrahim MD and the provider s statements to them. It was created on her behalf by Tuyet Avery, a trained medical scribe. This document has been checked and approved by the attending provider.    Liliana Ibrahim MD  Emergency Medicine  [facility] EMERGENCY ROOM  [address] [place] [id number]  [phone]  Dept: [phone]       Liliana Ibrahim MD  10/17/23 1007

## 2023-10-18 DIAGNOSIS — E11.9 TYPE 2 DIABETES MELLITUS WITHOUT COMPLICATION, WITHOUT LONG-TERM CURRENT USE OF INSULIN (H): ICD-10-CM

## 2023-10-18 DIAGNOSIS — E66.01 MORBID OBESITY (H): ICD-10-CM

## 2023-10-18 RX ORDER — TIRZEPATIDE 12.5 MG/.5ML
INJECTION, SOLUTION SUBCUTANEOUS
Qty: 2 ML | Refills: 1 | Status: SHIPPED | OUTPATIENT
Start: 2023-10-18 | End: 2023-11-22

## 2023-10-18 NOTE — TELEPHONE ENCOUNTER
Prescription approved per [facility] Refill Protocol.  Laci MARES RN 10/18/2023 at 1:59 PM

## 2023-10-19 ENCOUNTER — PATIENT OUTREACH (OUTPATIENT)
Dept: CARE COORDINATION | Facility: CLINIC | Age: 43
End: 2023-10-19
Payer: COMMERCIAL

## 2023-10-19 NOTE — LETTER
Ambar Jin  [address] [place] [address]    Dear Ambar Jin,      I am a team member within the [facility] with [facility]. I recently tried to reach you to ensure you were doing well following a recent visit within our health system. I also wanted to take this chance to introduce Clinic Care Coordination.     Below is a description of Clinic Care Coordination and how this team can further assist you:       The Clinic Care Coordination team is made up of a Registered Nurse, , Financial Resource Worker, and a Community Health Worker who understand and can help navigate the health care system. The goal of clinic care coordination is to help you manage your health, improve access to care, and achieve optimal health outcomes. They work alongside your provider to assist you in determining your health and social needs, obtain health care and community resources, and provide you with necessary information and education. Clinic Care Coordination can work with you through any barriers and develop a care plan that helps coordinate and strengthen the relationship between you and your care team.    If you wish to connect with the Clinic Care Coordination Team, please let your [facility] Care Provider or Clinic Care Team know and they can place a referral. The Clinic Care Coordination team will then reach out by phone to further support you.    We are focused on providing you with the highest-quality healthcare experience possible.    Sincerely,   Your care team with [facility]

## 2023-10-19 NOTE — PROGRESS NOTES
[facility] Contact  [facility]/Voicemail     Clinical Data: Care Coordination ED-sourced Outreach-     Outreach attempted x 2.  Left message on patient's voicemail, providing [facility]'s 24/7 scheduling and nurse triage phone number [phone] ([phone]) for questions/concerns and/or to schedule an appt with an [facility] provider.      Care Coordination introduction letter with explanation of Clinic Care Coordination services sent to patient via [employer]. Clinic Care Coordination services remain available via referral if needed.    Plan: [facility] will do no further outreaches at this time.       Nadya Adan  Community Health Worker  [facility] [place], [facility]    *Connected Care Resource Team does NOT follow patient ongoing. Referrals are identified based on internal discharge reports and the outreach is to ensure patient has an understanding of their discharge instructions.

## 2023-10-20 DIAGNOSIS — E11.9 TYPE 2 DIABETES MELLITUS WITHOUT COMPLICATION, WITHOUT LONG-TERM CURRENT USE OF INSULIN (H): ICD-10-CM

## 2023-10-20 RX ORDER — ESCITALOPRAM OXALATE 10 MG/1
TABLET ORAL
Qty: 90 TABLET | Refills: 0 | Status: SHIPPED | OUTPATIENT
Start: 2023-10-20 | End: 2024-02-24

## 2023-10-23 ENCOUNTER — E-VISIT (OUTPATIENT)
Dept: PEDIATRICS | Facility: CLINIC | Age: 43
End: 2023-10-23
Payer: COMMERCIAL

## 2023-10-23 DIAGNOSIS — R11.2 NAUSEA AND VOMITING, UNSPECIFIED VOMITING TYPE: Primary | ICD-10-CM

## 2023-10-23 PROCEDURE — 99207 PR NON-BILLABLE SERV PER CHARTING: CPT | Performed by: NURSE PRACTITIONER

## 2023-10-23 ASSESSMENT — PATIENT HEALTH QUESTIONNAIRE - PHQ9
10. IF YOU CHECKED OFF ANY PROBLEMS, HOW DIFFICULT HAVE THESE PROBLEMS MADE IT FOR YOU TO DO YOUR WORK, TAKE CARE OF THINGS AT HOME, OR GET ALONG WITH OTHER PEOPLE: SOMEWHAT DIFFICULT
SUM OF ALL RESPONSES TO PHQ QUESTIONS 1-9: 15
SUM OF ALL RESPONSES TO PHQ QUESTIONS 1-9: 15

## 2023-10-23 ASSESSMENT — ASTHMA QUESTIONNAIRES: ACT_TOTALSCORE: 24

## 2023-10-23 NOTE — PATIENT INSTRUCTIONS
Thank you for choosing us for your care. I think an in-clinic visit would be best next steps based on your symptoms. Please schedule a clinic appointment; you won t be charged for this eVisit.      You can schedule an appointment right here in [facility], or call [phone]

## 2023-10-24 ENCOUNTER — MYC MEDICAL ADVICE (OUTPATIENT)
Dept: PEDIATRICS | Facility: CLINIC | Age: 43
End: 2023-10-24

## 2023-10-24 ENCOUNTER — OFFICE VISIT (OUTPATIENT)
Dept: FAMILY MEDICINE | Facility: CLINIC | Age: 43
End: 2023-10-24
Payer: COMMERCIAL

## 2023-10-24 VITALS
TEMPERATURE: 96.6 F | WEIGHT: 251.1 LBS | DIASTOLIC BLOOD PRESSURE: 90 MMHG | BODY MASS INDEX: 38.06 KG/M2 | HEART RATE: 113 BPM | HEIGHT: 68 IN | SYSTOLIC BLOOD PRESSURE: 124 MMHG | OXYGEN SATURATION: 98 % | RESPIRATION RATE: 14 BRPM

## 2023-10-24 DIAGNOSIS — R11.2 NAUSEA AND VOMITING, UNSPECIFIED VOMITING TYPE: ICD-10-CM

## 2023-10-24 DIAGNOSIS — R10.13 ABDOMINAL PAIN, EPIGASTRIC: Primary | ICD-10-CM

## 2023-10-24 DIAGNOSIS — E11.9 TYPE 2 DIABETES MELLITUS WITHOUT COMPLICATION, WITHOUT LONG-TERM CURRENT USE OF INSULIN (H): ICD-10-CM

## 2023-10-24 PROBLEM — N83.292 OTHER OVARIAN CYST, LEFT SIDE: Status: ACTIVE | Noted: 2023-10-16

## 2023-10-24 PROCEDURE — 99214 OFFICE O/P EST MOD 30 MIN: CPT | Performed by: NURSE PRACTITIONER

## 2023-10-24 RX ORDER — SEMAGLUTIDE 1.34 MG/ML
1 INJECTION, SOLUTION SUBCUTANEOUS
COMMUNITY
Start: 2023-01-24 | End: 2023-10-24

## 2023-10-24 ASSESSMENT — PAIN SCALES - GENERAL: PAINLEVEL: NO PAIN (0)

## 2023-10-24 NOTE — PROGRESS NOTES
Assessment and Plan:       ICD-10-CM    1. Abdominal pain, epigastric  R10.13 Adult GI  Referral - Consult Only      2. Type 2 diabetes mellitus without complication, without long-term current use of insulin (H)  E11.9       3. Nausea and vomiting, unspecified vomiting type  R11.2         Differentials include gastritis, GERD, gastric ulcer, cyclic vomiting syndrome, adverse side effect from medication.  Ultrasound and CT scan showed no gallbladder or pancreatic abnormalities.  Recommend she increase omeprazole to 20 mg twice daily.  I discussed that vomiting can be a side effect of Mounjaro.  Recommend decreasing dose to 10 mg once weekly.  She has an appointment with [facility] pharmacist Friday.  She continues to avoid cannabis.  Will refer to gastroenterology for further evaluation if symptoms persist or worsen.  She is content with the plan.    Subjective:     Ambar is a 42 year old female presenting to the clinic for follow-up on ER evaluation.  Patient developed vomiting and epigastric abdominal pain 1 week ago. Patient was [facility]/2023 where she was diagnosed with an right-sided hemorrhagic ovarian cyst via ultrasound.  CT scan of the abdomen was otherwise unremarkable.  Patient followed up in the ER on 10/17/2023 due to worsening symptoms.  Cyclic vomiting syndrome was suspected.  Patient was discharged with Zofran and Phenergan suppository.  Patient presents today stating her symptoms gradually improved last week, but she did vomit yesterday morning.  She developed a burning sensation across her upper abdomen.  She started omeprazole 20 mg daily.  She denies constipation or diarrhea.  She has not had any blood or mucus in the stool.  She does have type 2 diabetes and is taking metformin 2000 mg daily.  She is injecting Mounjaro 12.5 mg daily.  She consumes alcohol once per month.  She denies recent ibuprofen or naproxen use.  She has discontinued cannabis use.    Reviewof  Systems: A complete 14 point review of systems was obtained and is negative or as st ated in the history of present illness.    Social History     Socioeconomic History    Marital status:      Spouse name: Not on file    Number of children: Not on file    Years of education: Not on file    Highest education level: Not on file   Occupational History    Not on file   Tobacco Use    Smoking status: Never     Passive exposure: Never    Smokeless tobacco: Never   Vaping Use    Vaping Use: Never used   Substance and Sexual Activity    Alcohol use: Yes     Comment: twice per month     Drug use: Never    Sexual activity: Yes     Partners: Male   Other Topics Concern    Parent/sibling w/ CABG, MI or angioplasty before 65F 55M? Not Asked   Social History Narrative    Not on file     Social Determinants of Health     Financial Resource Strain: Low Risk  (10/23/2023)    Financial Resource Strain     Within the past 12 months, have you or your family members you live with been unable to get utilities (heat, electricity) when it was really needed?: No   Food Insecurity: Low Risk  (10/23/2023)    Food Insecurity     Within the past 12 months, did you worry that your food would run out before you got money to buy more?: No     Within the past 12 months, did the food you bought just not last and you didn t have money to get more?: No   Transportation Needs: Low Risk  (10/23/2023)    Transportation Needs     Within the past 12 months, has lack of transportation kept you from medical appointments, getting your medicines, non-medical meetings or appointments, work, or from getting things that you need?: No   Physical Activity: Not on file   Stress: Not on file   Social Connections: Not on file   Interpersonal Safety: Low Risk  (10/24/2023)    Interpersonal Safety     Do you feel physically and emotionally safe where you currently live?: Yes     Within the past 12 months, have you been hit, slapped, kicked or otherwise physically  hurt by someone?: No     Within the past 12 months, have you been humiliated or emotionally abused in other ways by your partner or ex-partner?: No   Housing Stability: Low Risk  (10/23/2023)    Housing Stability     Do you have housing? : Yes     Are you worried about losing your housing?: No       Active Ambulatory Problems     Diagnosis Date Noted    Migraine without aura and without status migrainosus, not intractable 01/20/2020    AMANDA (generalized anxiety disorder) 01/20/2020    PCOS (polycystic ovarian syndrome) 01/20/2020    Mild recurrent major depression (H24) 01/20/2020    Type 2 diabetes mellitus without complication, without long-term current use of insulin (H) 04/01/2020    Mild intermittent asthma without complication 07/06/2020    Morbid obesity (H) 01/21/2021    Hepatic steatosis 02/23/2021    Alcohol abuse 07/24/2012    Problems related to other legal circumstances 07/24/2012    Abdominal pain, epigastric 04/26/2012    Abnormal liver function test 04/26/2012    Anxiety state 10/03/2007    Cervical high risk HPV (human papillomavirus) test positive 04/21/2013    Encounter for medication refill 03/25/2012    Hirsutism 07/02/2012    Iron deficiency anemia, unspecified 01/19/2011    Other ovarian cyst, left side 10/16/2023    Sacro-iliac pain 06/07/2013     Resolved Ambulatory Problems     Diagnosis Date Noted    No Resolved Ambulatory Problems     Past Medical History:   Diagnosis Date    Anxiety     Asthma     Depression     Diabetes mellitus, type 2 (H)        Family History   Problem Relation Age of Onset    Diabetes Type 2  Mother     Depression Mother     Arthritis Mother     Cerebrovascular Disease Mother 60.00    Diabetes Type 2  Father     Depression Father     Early Death Sister         rare blood disorder    Lung Cancer Maternal Grandmother     Cerebrovascular Disease Maternal Grandmother     Throat cancer Maternal Grandfather     Diabetes Maternal Grandfather     Prostate Cancer Paternal  "Grandfather     Depression Sister     Asthma Sister     Liver Disease Sister         Primary biliary cholangitis        Objective:     BP (!) 124/90   Pulse 113   Temp (!) 96.6  F (35.9  C)   Resp 14   Ht 1.727 m (5' 8\")   Wt 113.9 kg (251 lb 1.6 oz)   LMP 10/14/2023   SpO2 98%   Breastfeeding No   BMI 38.18 kg/m      Patient is alert, in no obvious distress.   Skin: Warm, dry.   Lungs:  Clear to auscultation. Respirations even and unlabored.  No wheezing or rales noted.   Heart:  Regular rate and rhythm.  No murmurs, S3, S4, gallops, or rubs.    Abdomen: Soft, nontender.  No organomegaly. Bowel sounds normoactive. No guarding or masses noted.       Answers submitted by the patient for this visit:  Patient Health Questionnaire (Submitted on 10/23/2023)  If you checked off any problems, how difficult have these problems made it for you to do your work, take care of things at home, or get along with other people?: Somewhat difficult  PHQ9 TOTAL SCORE: 15  General Questionnaire (Submitted on 10/23/2023)  Chief Complaint: Chronic problems general questions HPI Form  How many servings of fruits and vegetables do you eat daily?: 2-3  On average, how many sweetened beverages do you drink each day (Examples: soda, juice, sweet tea, etc.  Do NOT count diet or artificially sweetened beverages)?: 0  How many minutes a day do you exercise enough to make your heart beat faster?: 10 to 19  How many days a week do you exercise enough to make your heart beat faster?: 3 or less  How many days per week do you miss taking your medication?: 0  General Concern (Submitted on 10/23/2023)  Chief Complaint: Chronic problems general questions HPI Form  What is the reason for your visit today?: Abdominal pain, vomiting  When did your symptoms begin?: 1-2 weeks ago  What are your symptoms?: Vomiting  How would you describe these symptoms?: Severe  Are your symptoms:: Staying the same  Have you had these symptoms before?: No  Is there " anything that makes you feel worse?: Eating, drinking, being awake.  Is there anything that makes you feel better?: Sleep

## 2023-10-31 ENCOUNTER — OFFICE VISIT (OUTPATIENT)
Dept: OBGYN | Facility: CLINIC | Age: 43
End: 2023-10-31
Payer: COMMERCIAL

## 2023-10-31 VITALS
SYSTOLIC BLOOD PRESSURE: 122 MMHG | BODY MASS INDEX: 40.82 KG/M2 | HEIGHT: 66 IN | DIASTOLIC BLOOD PRESSURE: 84 MMHG | WEIGHT: 254 LBS

## 2023-10-31 DIAGNOSIS — N83.201 RIGHT OVARIAN CYST: Primary | ICD-10-CM

## 2023-10-31 PROCEDURE — 99203 OFFICE O/P NEW LOW 30 MIN: CPT | Performed by: OBSTETRICS & GYNECOLOGY

## 2023-10-31 NOTE — NURSING NOTE
"Chief Complaint   Patient presents with    Consult     Right ovarian cyst U/S 10/16       Initial /84 (BP Location: Right arm, Patient Position: Chair, Cuff Size: Adult Large)   Ht 1.676 m (5' 6\")   Wt 115.2 kg (254 lb)   LMP 10/14/2023   Breastfeeding No   BMI 41.00 kg/m   Estimated body mass index is 41 kg/m  as calculated from the following:    Height as of this encounter: 1.676 m (5' 6\").    Weight as of this encounter: 115.2 kg (254 lb).  BP completed using cuff size: large    Questioned patient about current smoking habits.  Pt. has never smoked.      No obstetric history on file.    The following HM Due: NONE  Claudia Zhu CMA    "

## 2023-10-31 NOTE — PROGRESS NOTES
"  SUBJECTIVE:                                                   CC:  Patient presents with:  Consult: Right ovarian cyst U/S 10/16      HPI:  Ambar Jin is a 42 year old  who presents for ED follow-up.      On  she developed acute onset nausea vomiting and upper abdominal pain.  She went to the emergency room where they did a CT and found a right ovarian cyst.  On pelvic ultrasound it showed a 7.7 cm right ovarian cyst, presumably hemorrhagic.  She improved with IV hydration and antiemetics and now the pain is much better controlled.    She is a para 0, currently trying to conceive.  She has a long history of PCOS and only recently have her periods become more regular.  She has not yet started a prenatal vitamin.    Gyn History:  Patient's last menstrual period was 10/14/2023.       Using none for contraception.    Last 3 Pap and HPV Results:       Latest Ref Rng & Units 2020    11:59 AM   PAP / HPV   PAP Negative for squamous intraepithelial lesion or malignancy. Negative for squamous intraepithelial lesion or malignancy  Electronically signed by Mercy Christopher CT (ASCP) on 2020 at 10:05 AM      HPV 16 DNA NEG Negative    HPV 18 DNA NEG Negative    Other HR HPV NEG Negative        PMH, PSH, Soc Hx, Fam Hx, Meds, and allergies reviewed in Epic.    OBJECTIVE:     /84 (BP Location: Right arm, Patient Position: Chair, Cuff Size: Adult Large)   Ht 1.676 m (5' 6\")   Wt 115.2 kg (254 lb)   LMP 10/14/2023   Breastfeeding No   BMI 41.00 kg/m      Gen: Healthy appearing obese female, no acute distress, comfortable  Psych: mentation appears normal and affect bright    Test Results:    EXAM: US PELVIS COMPLETE TA AND TV WITH DUPLEX   LOCATION: [facility]   DATE: 10/16/2023     INDICATION: Pain/tenderness. Right adnexal cyst seen on CT.   COMPARISON: CT abdomen and pelvis 10/16/2023   TECHNIQUE: Transabdominal scans were performed. Endovaginal ultrasound was " performed to better visualize the adnexa. Color flow with spectral Doppler and waveform analysis performed.     FINDINGS:     UTERUS: 8.3 x 6.4 x 3.4 cm. Normal size and position. 0.8 cm partially subserosal fibroid arising from the posterior uterine body.     ENDOMETRIUM: 4 mm. Normal smooth endometrium.     RIGHT OVARY: 8.2 x 6.8 x 5.8 cm. 7.7 x 5.6 x 5.2 cm unilocular cyst with retractile clot within it consistent with a hemorrhagic cyst. Normal arterial and venous duplex flow.     LEFT OVARY: 2.5 x 2 x 1.7 cm. Normal with arterial and venous duplex flow identified.     No significant free fluid.     ASSESSMENT/PLAN:                                                      1. Right ovarian cyst  Recommend follow-up US in 6-12 weeks.  Reviewed etiology of hemorrhagic cysts.  Reviewed the reports from her ED/Urgent care visits.  Reviewed possibility of spontaneous resolution versus other complications that can occur with ovarian cysts.  All questions answered today.    - US Pelvic Complete with Transvaginal; Future    Kassidy Lujan MD, MPH  Obstetrics and Gynecology      Total time spent was 31 minutes; this includes pre-work time, intra-service time, and post-work time including time spent on documentation which occurred on the date of service.

## 2023-11-09 ENCOUNTER — TELEPHONE (OUTPATIENT)
Dept: PEDIATRICS | Facility: CLINIC | Age: 43
End: 2023-11-09
Payer: COMMERCIAL

## 2023-11-09 NOTE — TELEPHONE ENCOUNTER
Prior Authorization Retail Medication Request    Medication/Dose: MOUNJARO 12.5 MG/0.5ML pen  ICD code (if different than what is on RX):  E11.9, E66.01  Previously Tried and Failed:  NA  Rationale:  Patient needs to treat diabetes and morbid obesity     Insurance Name:  [facility]  Insurance ID:    [id number]       Pharmacy Information (if different than what is on RX)  Name:  [facility]   Phone:  [phone]

## 2023-11-14 NOTE — TELEPHONE ENCOUNTER
Prior Authorization Not Needed per Insurance    Medication: MOUNJARO 12.5 MG/0.5ML pen     There is an active pa approval on file for this medication until 4/9/24. No additional pa is needed.      Pharmacy Notified:  Yes- Pharmacy received a paid claim.  Patient Notified:  No    Please close encounter when finished.    Thank you,  Central Prior Authorization Team  [phone]

## 2023-11-15 DIAGNOSIS — E11.9 TYPE 2 DIABETES MELLITUS WITHOUT COMPLICATION, WITHOUT LONG-TERM CURRENT USE OF INSULIN (H): ICD-10-CM

## 2023-11-16 RX ORDER — METFORMIN HCL 500 MG
2000 TABLET, EXTENDED RELEASE 24 HR ORAL
Qty: 360 TABLET | Refills: 3 | Status: SHIPPED | OUTPATIENT
Start: 2023-11-16

## 2023-11-17 ENCOUNTER — HOSPITAL ENCOUNTER (OUTPATIENT)
Dept: ULTRASOUND IMAGING | Facility: CLINIC | Age: 43
Discharge: HOME | End: 2023-11-17
Attending: OBSTETRICS & GYNECOLOGY | Admitting: OBSTETRICS & GYNECOLOGY
Payer: COMMERCIAL

## 2023-11-17 DIAGNOSIS — N83.201 RIGHT OVARIAN CYST: ICD-10-CM

## 2023-11-17 DIAGNOSIS — E11.9 TYPE 2 DIABETES MELLITUS WITHOUT COMPLICATION, WITHOUT LONG-TERM CURRENT USE OF INSULIN (H): ICD-10-CM

## 2023-11-17 PROCEDURE — 76856 US EXAM PELVIC COMPLETE: CPT

## 2023-11-20 NOTE — TELEPHONE ENCOUNTER
Medication is being filled for 1 time refill only due to:  Patient needs to be seen because due for appt, scheduled for 12/1.    Tierra Gamble RN, BSN  [facility]

## 2023-11-24 ASSESSMENT — ASTHMA QUESTIONNAIRES
ACT_TOTALSCORE: 25
QUESTION_3 LAST FOUR WEEKS HOW OFTEN DID YOUR ASTHMA SYMPTOMS (WHEEZING, COUGHING, SHORTNESS OF BREATH, CHEST TIGHTNESS OR PAIN) WAKE YOU UP AT NIGHT OR EARLIER THAN USUAL IN THE MORNING: NOT AT ALL
QUESTION_4 LAST FOUR WEEKS HOW OFTEN HAVE YOU USED YOUR RESCUE INHALER OR NEBULIZER MEDICATION (SUCH AS ALBUTEROL): NOT AT ALL
ACT_TOTALSCORE: 25
QUESTION_2 LAST FOUR WEEKS HOW OFTEN HAVE YOU HAD SHORTNESS OF BREATH: NOT AT ALL
QUESTION_5 LAST FOUR WEEKS HOW WOULD YOU RATE YOUR ASTHMA CONTROL: COMPLETELY CONTROLLED
QUESTION_1 LAST FOUR WEEKS HOW MUCH OF THE TIME DID YOUR ASTHMA KEEP YOU FROM GETTING AS MUCH DONE AT WORK, SCHOOL OR AT HOME: NONE OF THE TIME

## 2023-11-27 DIAGNOSIS — E11.9 TYPE 2 DIABETES MELLITUS WITHOUT COMPLICATION, WITHOUT LONG-TERM CURRENT USE OF INSULIN (H): ICD-10-CM

## 2023-11-28 RX ORDER — ESCITALOPRAM OXALATE 10 MG/1
TABLET ORAL
Qty: 90 TABLET | Refills: 0 | OUTPATIENT
Start: 2023-11-28

## 2023-12-01 ENCOUNTER — OFFICE VISIT (OUTPATIENT)
Dept: PEDIATRICS | Facility: CLINIC | Age: 43
End: 2023-12-01
Payer: COMMERCIAL

## 2023-12-01 VITALS
HEIGHT: 66 IN | HEART RATE: 106 BPM | WEIGHT: 248 LBS | SYSTOLIC BLOOD PRESSURE: 118 MMHG | BODY MASS INDEX: 39.86 KG/M2 | DIASTOLIC BLOOD PRESSURE: 80 MMHG | OXYGEN SATURATION: 98 % | RESPIRATION RATE: 16 BRPM | TEMPERATURE: 97 F

## 2023-12-01 DIAGNOSIS — F33.0 MILD RECURRENT MAJOR DEPRESSION (H): ICD-10-CM

## 2023-12-01 DIAGNOSIS — G43.009 MIGRAINE WITHOUT AURA AND WITHOUT STATUS MIGRAINOSUS, NOT INTRACTABLE: ICD-10-CM

## 2023-12-01 DIAGNOSIS — E66.01 MORBID OBESITY (H): ICD-10-CM

## 2023-12-01 DIAGNOSIS — E78.5 HYPERLIPIDEMIA LDL GOAL <100: ICD-10-CM

## 2023-12-01 DIAGNOSIS — L30.9 ECZEMA, UNSPECIFIED TYPE: ICD-10-CM

## 2023-12-01 DIAGNOSIS — E11.9 TYPE 2 DIABETES MELLITUS WITHOUT COMPLICATION, WITHOUT LONG-TERM CURRENT USE OF INSULIN (H): Primary | ICD-10-CM

## 2023-12-01 DIAGNOSIS — F41.1 GAD (GENERALIZED ANXIETY DISORDER): ICD-10-CM

## 2023-12-01 LAB
CREAT UR-MCNC: 209 MG/DL
HBA1C MFR BLD: 5.2 % (ref 0–5.6)
MICROALBUMIN UR-MCNC: 78.8 MG/L
MICROALBUMIN/CREAT UR: 37.7 MG/G CR (ref 0–25)

## 2023-12-01 PROCEDURE — 91320 SARSCV2 VAC 30MCG TRS-SUC IM: CPT | Performed by: NURSE PRACTITIONER

## 2023-12-01 PROCEDURE — 82043 UR ALBUMIN QUANTITATIVE: CPT | Performed by: NURSE PRACTITIONER

## 2023-12-01 PROCEDURE — 99207 PR FOOT EXAM NO CHARGE: CPT | Performed by: NURSE PRACTITIONER

## 2023-12-01 PROCEDURE — 99215 OFFICE O/P EST HI 40 MIN: CPT | Mod: 25 | Performed by: NURSE PRACTITIONER

## 2023-12-01 PROCEDURE — 83036 HEMOGLOBIN GLYCOSYLATED A1C: CPT | Performed by: NURSE PRACTITIONER

## 2023-12-01 PROCEDURE — 36415 COLL VENOUS BLD VENIPUNCTURE: CPT | Performed by: NURSE PRACTITIONER

## 2023-12-01 PROCEDURE — 82570 ASSAY OF URINE CREATININE: CPT | Performed by: NURSE PRACTITIONER

## 2023-12-01 PROCEDURE — 90480 ADMN SARSCOV2 VAC 1/ONLY CMP: CPT | Performed by: NURSE PRACTITIONER

## 2023-12-01 RX ORDER — PHENTERMINE HYDROCHLORIDE 37.5 MG/1
37.5 CAPSULE ORAL EVERY MORNING
Qty: 30 CAPSULE | Refills: 0 | Status: SHIPPED | OUTPATIENT
Start: 2023-12-01 | End: 2024-01-10

## 2023-12-01 RX ORDER — UBIDECARENONE 100 MG
100 CAPSULE ORAL DAILY
COMMUNITY

## 2023-12-01 RX ORDER — FAMOTIDINE 20 MG/1
20 TABLET, FILM COATED ORAL 2 TIMES DAILY PRN
COMMUNITY
End: 2024-05-21

## 2023-12-01 RX ORDER — TRIAMCINOLONE ACETONIDE 1 MG/G
CREAM TOPICAL 2 TIMES DAILY
Qty: 30 G | Refills: 0 | Status: SHIPPED | OUTPATIENT
Start: 2023-12-01

## 2023-12-01 RX ORDER — CHLORAL HYDRATE 500 MG
2 CAPSULE ORAL DAILY
COMMUNITY

## 2023-12-01 RX ORDER — TIRZEPATIDE 15 MG/.5ML
15 INJECTION, SOLUTION SUBCUTANEOUS
Qty: 2 ML | Refills: 0 | Status: SHIPPED | OUTPATIENT
Start: 2023-12-01 | End: 2024-02-01

## 2023-12-01 ASSESSMENT — PATIENT HEALTH QUESTIONNAIRE - PHQ9
SUM OF ALL RESPONSES TO PHQ QUESTIONS 1-9: 11
10. IF YOU CHECKED OFF ANY PROBLEMS, HOW DIFFICULT HAVE THESE PROBLEMS MADE IT FOR YOU TO DO YOUR WORK, TAKE CARE OF THINGS AT HOME, OR GET ALONG WITH OTHER PEOPLE: SOMEWHAT DIFFICULT
SUM OF ALL RESPONSES TO PHQ QUESTIONS 1-9: 11

## 2023-12-01 ASSESSMENT — PAIN SCALES - GENERAL: PAINLEVEL: NO PAIN (0)

## 2023-12-01 NOTE — PROGRESS NOTES
Assessment & Plan     Type 2 diabetes mellitus without complication, without long-term current use of insulin (H)  A1C greatly improved today, 5.2%. Due for diabetic eye exam, patient will schedule. Foot exam done today. Continue mounjaro (see below) and metformin.   - Hemoglobin A1c  - Albumin Random Urine Quantitative with Creat Ratio  - tirzepatide (MOUNJARO) 15 MG/0.5ML pen; Inject 15 mg Subcutaneous every 7 days  - FOOT EXAM    Morbid obesity (H)  Has lost 72 pounds since starting phentermine 8/2022. Discussed discontinuing phentermine today but patient is hesitant seeing as how successful she has been. Her weight loss has slowed down a bit and we are going to increase mounjaro to max dose of 15 mg weekly. Continue phentermine 37.5 mg daily for now. Follow-up with MTM in 6 weeks at which time we will consider discontinuing the phentermine. Discussed today that if she and her  get serious about trying to conceive, she needs to stop taking mounjaro.  - tirzepatide (MOUNJARO) 15 MG/0.5ML pen; Inject 15 mg Subcutaneous every 7 days  - phentermine (ADIPEX-P) 37.5 MG capsule; Take 1 capsule (37.5 mg) by mouth every morning    Hyperlipidemia LDL goal <100  Stopped atorvastatin a couple months ago because she and her  started reconsidering trying to conceive. She is not fasting this morning so will hold on checking FLP today, consider at future visit with MTM.     Eczema, unspecified type  Patch to right lower leg, trial of topical steroid.   - triamcinolone (KENALOG) 0.1 % external cream; Apply topically 2 times daily    Migraine without aura and without status migrainosus, not intractable  Uses tramadol as needed, migraines are under really good control.    AMANDA (generalized anxiety disorder)  Mild recurrent major depression (H24)  Stable. Taking lexapro as prescribed. No longer taking wellbutrin. Found that she would have episodes of anger, fights with her . Doing better off it. Has neuropsych  "testing next week to evaluate for ADHD.       41 minutes spent by me on the date of the encounter doing chart review, review of test results, interpretation of tests, patient visit, and documentation        BMI:   Estimated body mass index is 40.03 kg/m  as calculated from the following:    Height as of this encounter: 1.676 m (5' 6\").    Weight as of this encounter: 112.5 kg (248 lb).   Weight management plan: pharmacotherapy     MEDICATIONS:        - Increase mounjaro to 15 mg       - Discontinue atorvastatin, wellbutrin, levimir       - Start taking triamcinolone cream       - Continue other medications without change  FUTURE APPOINTMENTS:       - Follow-up visit with MTM in 6 weeks.     CHUY Monge CNP  M [facility] JESSICA Arechiga is a 43 year old, presenting for the following health issues:  Diabetes        12/1/2023    10:14 AM   Additional Questions   Roomed by Livia MASTERS   Accompanied by Self         12/1/2023    10:14 AM   Patient Reported Additional Medications   Patient reports taking the following new medications n/a       Presents for diabetes/weight/hyperlipidemia follow-up.     Has been taking mounjaro 12.5 mg weekly for the past three months.   Tolerating well without side effects.   Has lost 72 pounds since starting phentermine 8/2022. Switched from ozempic to mounjaro 4/2023.   Admits the weight loss has slowed down a bit, down 6 pounds since 10/31.   Has been off levimir for the past month, insurance no longer covers.   Diabetic eye exam - [facility]. Last visit 1.5 years ago.     Stopped atorvastatin a couple months ago because she and her  started reconsidering trying to conceive. She is not fasting this morning.     No longer taking wellbutrin. Found that she would have episodes of anger, fights with her . Doing better off it. Has neuropsych testing next week to evaluate for ADHD.     Last MTM visit 8/14/2023:  Weight Management/Diabetes:    " Increase Mounjaro 12.5mg once weekly  2.    Levemir 12 units once daily, reduce to 10 units if your blood sugars are <70 mg/dL or 2 weeks after starting Mounjaro 12.5mg once weekly  3.    Eye exam due, please schedule this  4.    Check hemoglobin A1C before your visit on 10/27/23, go to lab first on 1st floor  Consider stopping phentermine next visit.      Wt Readings from Last 10 Encounters:   12/01/23 112.5 kg (248 lb)   10/31/23 115.2 kg (254 lb)   10/24/23 113.9 kg (251 lb 1.6 oz)   10/17/23 113.9 kg (251 lb)   05/08/23 132.3 kg (291 lb 11.2 oz)   04/05/23 134.3 kg (296 lb)   02/01/23 136.1 kg (300 lb)   10/28/22 141.1 kg (311 lb)   09/30/22 142.5 kg (314 lb 1.6 oz)   08/31/22 145.5 kg (320 lb 12.8 oz)       Patient Active Problem List   Diagnosis    Migraine without aura and without status migrainosus, not intractable    AMANDA (generalized anxiety disorder)    PCOS (polycystic ovarian syndrome)    Mild recurrent major depression (H24)    Type 2 diabetes mellitus without complication, without long-term current use of insulin (H)    Mild intermittent asthma without complication    Morbid obesity (H)    Hepatic steatosis    Alcohol abuse    Problems related to other legal circumstances    Abdominal pain, epigastric    Abnormal liver function test    Anxiety state    Cervical high risk HPV (human papillomavirus) test positive    Encounter for medication refill    Hirsutism    Iron deficiency anemia, unspecified    Other ovarian cyst, left side    Sacro-iliac pain     Past Medical History:   Diagnosis Date    Anxiety     Asthma     Depression     Diabetes mellitus, type 2 (H)     PCOS (polycystic ovarian syndrome)      Past Surgical History:   Procedure Laterality Date    CHOLECYSTECTOMY      FOOT FRACTURE SURGERY Left     x3    FOOT FRACTURE SURGERY Left 2011     Family History   Problem Relation Age of Onset    Diabetes Type 2  Mother     Depression Mother     Arthritis Mother     Cerebrovascular Disease Mother  60.00    Diabetes Type 2  Father     Depression Father     Early Death Sister         rare blood disorder    Lung Cancer Maternal Grandmother     Cerebrovascular Disease Maternal Grandmother     Throat cancer Maternal Grandfather     Diabetes Maternal Grandfather     Prostate Cancer Paternal Grandfather     Depression Sister     Asthma Sister     Liver Disease Sister         Primary biliary cholangitis      Social History     Socioeconomic History    Marital status:      Spouse name: Not on file    Number of children: Not on file    Years of education: Not on file    Highest education level: Not on file   Occupational History    Not on file   Tobacco Use    Smoking status: Never     Passive exposure: Never    Smokeless tobacco: Never   Vaping Use    Vaping Use: Never used   Substance and Sexual Activity    Alcohol use: Yes     Comment: twice per month     Drug use: Never    Sexual activity: Yes     Partners: Male   Other Topics Concern    Parent/sibling w/ CABG, MI or angioplasty before 65F 55M? Not Asked   Social History Narrative    Not on file     Social Determinants of Health     Financial Resource Strain: Low Risk  (11/24/2023)    Financial Resource Strain     Within the past 12 months, have you or your family members you live with been unable to get utilities (heat, electricity) when it was really needed?: No   Food Insecurity: Low Risk  (11/24/2023)    Food Insecurity     Within the past 12 months, did you worry that your food would run out before you got money to buy more?: No     Within the past 12 months, did the food you bought just not last and you didn t have money to get more?: No   Transportation Needs: Low Risk  (11/24/2023)    Transportation Needs     Within the past 12 months, has lack of transportation kept you from medical appointments, getting your medicines, non-medical meetings or appointments, work, or from getting things that you need?: No   Physical Activity: Not on file   Stress:  "Not on file   Social Connections: Not on file   Interpersonal Safety: Low Risk  (10/24/2023)    Interpersonal Safety     Do you feel physically and emotionally safe where you currently live?: Yes     Within the past 12 months, have you been hit, slapped, kicked or otherwise physically hurt by someone?: No     Within the past 12 months, have you been humiliated or emotionally abused in other ways by your partner or ex-partner?: No   Housing Stability: Low Risk  (11/24/2023)    Housing Stability     Do you have housing? : Yes     Are you worried about losing your housing?: No     Current Outpatient Medications   Medication    albuterol (PROAIR HFA/PROVENTIL HFA/VENTOLIN HFA) 108 (90 Base) MCG/ACT inhaler    BD DONNELL U/F 32G X 4 MM insulin pen needle    cetirizine HCl (ZYRTEC ORAL)    cholecalciferol, vitamin D3, (VITAMIN D3 ORAL)    co-enzyme Q-10 100 MG CAPS capsule    Continuous Blood Gluc Sensor (FREESTYLE DOROTHY 3 SENSOR) MISC    escitalopram (LEXAPRO) 10 MG tablet    escitalopram (LEXAPRO) 20 MG tablet    famotidine (PEPCID) 20 MG tablet    fish oil-omega-3 fatty acids 1000 MG capsule    ibuprofen (ADVIL/MOTRIN) 200 MG tablet    insulin detemir (LEVEMIR PEN) 100 UNIT/ML pen    lactobacillus rhamnosus, GG, (CULTURELL) capsule    metFORMIN (GLUCOPHAGE XR) 500 MG 24 hr tablet    phentermine (ADIPEX-P) 37.5 MG capsule    tirzepatide (MOUNJARO) 15 MG/0.5ML pen    traMADol (ULTRAM) 50 MG tablet    triamcinolone (KENALOG) 0.1 % external cream     No current facility-administered medications for this visit.        Allergies   Allergen Reactions    Alprazolam Other (See Comments)     Irritable, no memory of what happened    Clonazepam Dizziness     No memory of what happened while on medication       Review of Systems    ROS: 10 point ROS neg other than the symptoms noted above in the HPI.        Objective    /80   Pulse 106   Temp 97  F (36.1  C) (Tympanic)   Resp 16   Ht 1.676 m (5' 6\")   Wt 112.5 kg (248 lb)  "  LMP 11/21/2023   SpO2 98%   BMI 40.03 kg/m    Body mass index is 40.03 kg/m .  Physical Exam  Constitutional:       General: She is not in acute distress.     Appearance: Normal appearance. She is not ill-appearing or toxic-appearing.   Cardiovascular:      Rate and Rhythm: Normal rate and regular rhythm.      Heart sounds: Normal heart sounds. No murmur heard.     No friction rub. No gallop.   Pulmonary:      Effort: Pulmonary effort is normal. No respiratory distress.      Breath sounds: Normal breath sounds. No wheezing, rhonchi or rales.   Skin:     General: Skin is warm and dry.   Neurological:      General: No focal deficit present.      Mental Status: She is alert and oriented to person, place, and time.   Psychiatric:         Mood and Affect: Mood normal.         Behavior: Behavior normal.        Diabetic foot exam: normal DP and PT pulses, no trophic changes or ulcerative lesions, normal sensory exam, and normal monofilament exam      Results for orders placed or performed in visit on 12/01/23 (from the past 24 hour(s))   Hemoglobin A1c   Result Value Ref Range    Hemoglobin A1C 5.2 0.0 - 5.6 %    Narrative    Result confirmed by repeat test.

## 2023-12-04 DIAGNOSIS — R80.9 MICROALBUMINURIA: Primary | ICD-10-CM

## 2023-12-04 DIAGNOSIS — E11.9 TYPE 2 DIABETES MELLITUS WITHOUT COMPLICATION, WITHOUT LONG-TERM CURRENT USE OF INSULIN (H): ICD-10-CM

## 2023-12-04 RX ORDER — ESCITALOPRAM OXALATE 10 MG/1
TABLET ORAL
Qty: 90 TABLET | Refills: 0 | OUTPATIENT
Start: 2023-12-04

## 2023-12-05 ENCOUNTER — MYC MEDICAL ADVICE (OUTPATIENT)
Dept: OBGYN | Facility: CLINIC | Age: 43
End: 2023-12-05
Payer: COMMERCIAL

## 2023-12-05 DIAGNOSIS — Z92.89 H/O MAGNETIC RESONANCE IMAGING: Primary | ICD-10-CM

## 2023-12-06 NOTE — TELEPHONE ENCOUNTER
Pt indicated that she gets claustrophobic during MRIs. Is there something you can prescribe her?    SAIMA Elliott

## 2023-12-06 NOTE — TELEPHONE ENCOUNTER
As previously requested, please schedule her a follow-up visit with me following her MRI.    9:15 on 12/12 is a good spot to overbook.

## 2023-12-11 RX ORDER — LORAZEPAM 1 MG/1
1 TABLET ORAL ONCE
Qty: 1 TABLET | Refills: 0 | Status: SHIPPED | OUTPATIENT
Start: 2023-12-11 | End: 2023-12-11

## 2023-12-11 NOTE — TELEPHONE ENCOUNTER
Please see Aryngat message and advise - patient is willing to try alprazolam again.    Itzel LARRY RN

## 2023-12-11 NOTE — TELEPHONE ENCOUNTER
Pharmacy entered.    Pt allergic to alprazolam (side effect) and clonazepam (dizziness)    Requesting med for MRI      Hanna HUBBARD RN BSN

## 2023-12-13 ENCOUNTER — VIRTUAL VISIT (OUTPATIENT)
Dept: PSYCHOLOGY | Facility: CLINIC | Age: 43
End: 2023-12-13
Payer: COMMERCIAL

## 2023-12-13 DIAGNOSIS — F88 DEVELOPMENTAL MENTAL DISORDER: ICD-10-CM

## 2023-12-13 DIAGNOSIS — F41.1 GENERALIZED ANXIETY DISORDER: Primary | ICD-10-CM

## 2023-12-13 PROCEDURE — 90832 PSYTX W PT 30 MINUTES: CPT | Mod: 95 | Performed by: PSYCHOLOGIST

## 2023-12-13 ASSESSMENT — PATIENT HEALTH QUESTIONNAIRE - PHQ9
SUM OF ALL RESPONSES TO PHQ QUESTIONS 1-9: 9
10. IF YOU CHECKED OFF ANY PROBLEMS, HOW DIFFICULT HAVE THESE PROBLEMS MADE IT FOR YOU TO DO YOUR WORK, TAKE CARE OF THINGS AT HOME, OR GET ALONG WITH OTHER PEOPLE: SOMEWHAT DIFFICULT
SUM OF ALL RESPONSES TO PHQ QUESTIONS 1-9: 9

## 2023-12-13 ASSESSMENT — COLUMBIA-SUICIDE SEVERITY RATING SCALE - C-SSRS
1. HAVE YOU WISHED YOU WERE DEAD OR WISHED YOU COULD GO TO SLEEP AND NOT WAKE UP?: NO
6. HAVE YOU EVER DONE ANYTHING, STARTED TO DO ANYTHING, OR PREPARED TO DO ANYTHING TO END YOUR LIFE?: NO
TOTAL  NUMBER OF ABORTED OR SELF INTERRUPTED ATTEMPTS LIFETIME: NO
ATTEMPT LIFETIME: NO
TOTAL  NUMBER OF INTERRUPTED ATTEMPTS LIFETIME: NO
2. HAVE YOU ACTUALLY HAD ANY THOUGHTS OF KILLING YOURSELF?: NO

## 2023-12-13 ASSESSMENT — ANXIETY QUESTIONNAIRES
3. WORRYING TOO MUCH ABOUT DIFFERENT THINGS: NEARLY EVERY DAY
1. FEELING NERVOUS, ANXIOUS, OR ON EDGE: NEARLY EVERY DAY
6. BECOMING EASILY ANNOYED OR IRRITABLE: MORE THAN HALF THE DAYS
5. BEING SO RESTLESS THAT IT IS HARD TO SIT STILL: NOT AT ALL
GAD7 TOTAL SCORE: 12
GAD7 TOTAL SCORE: 12
IF YOU CHECKED OFF ANY PROBLEMS ON THIS QUESTIONNAIRE, HOW DIFFICULT HAVE THESE PROBLEMS MADE IT FOR YOU TO DO YOUR WORK, TAKE CARE OF THINGS AT HOME, OR GET ALONG WITH OTHER PEOPLE: SOMEWHAT DIFFICULT
2. NOT BEING ABLE TO STOP OR CONTROL WORRYING: NEARLY EVERY DAY
7. FEELING AFRAID AS IF SOMETHING AWFUL MIGHT HAPPEN: SEVERAL DAYS
4. TROUBLE RELAXING: NOT AT ALL

## 2023-12-13 NOTE — PROGRESS NOTES
[facility]   Mental Health & Addiction Services     Progress Note - Initial Visit    Client Name:  Ambar Jin Date: 2023         Service Type: Individual     Visit Start Time: 2:00pm  Visit End Time: 2:33pm    Visit #: 1    Attendees: Client attended alone    Service Modality:  Video Visit:      Provider verified identity through the following two step process.  Patient provided:  Patient  and Patient address    Telemedicine Visit: The patient's condition can be safely assessed and treated via synchronous audio and visual telemedicine encounter.      Reason for Telemedicine Visit: Services only offered telehealth    Originating Site (Patient Location): Patient's home    Distant Site (Provider Location): Provider Remote Setting- Home Office    Consent:  The patient/guardian has verbally consented to: the potential risks and benefits of telemedicine (video visit) versus in person care; bill my insurance or make self-payment for services provided; and responsibility for payment of non-covered services.     Patient would like the video invitation sent by:  Send to e-mail at: [email or web address]    Mode of Communication:  Video Conference via [facility]    Distant Location (Provider):  Off-site    As the provider I attest to compliance with applicable laws and regulations related to telemedicine.       DATA:  Extended Session (53+ minutes): No  Interactive Complexity: No   Crisis: No     Presenting Concerns/Current Stressors:   Patient presented to session to initiate the ADHD evaluation process.           10/23/2023     5:03 PM 2023    10:05 AM 2023    10:54 AM   PHQ   PHQ-9 Total Score 15 11 9   Q9: Thoughts of better off dead/self-harm past 2 weeks Not at all Not at all Not at all            1/10/2022    12:58 PM 2023    10:34 AM 2023    10:55 AM   AMANDA-7 SCORE   Total Score  13 (moderate anxiety) 12 (moderate anxiety)   Total Score 2 13 12        ASSESSMENT:  Mental Status Assessment:  Appearance:   Appropriate   Eye Contact:   Good   Psychomotor Behavior: Normal   Attitude:   Cooperative   Orientation:   All  Speech   Rate / Production: Normal/ Responsive   Volume:  Normal   Mood:    Normal  Affect:    Appropriate   Thought Content:  Clear   Thought Form:  Logical   Insight:    Good       Safety Issues and Plan for Safety and Risk Management:   [place] Suicide Severity Rating Scale (Lifetime/Recent)      12/13/2023     2:04 PM   [place] Suicide Severity Rating (Lifetime/Recent)   Q1 Wish to be Dead (Lifetime) N   Q2 Non-Specific Active Suicidal Thoughts (Lifetime) N   Actual Attempt (Lifetime) N   Has subject engaged in non-suicidal self-injurious behavior? (Lifetime) N   Interrupted Attempts (Lifetime) N   Aborted or Self-Interrupted Attempt (Lifetime) N   Preparatory Acts or Behavior (Lifetime) N   Calculated C-SSRS Risk Score (Lifetime/Recent) No Risk Indicated     Patient denies current fears or concerns for personal safety.  Patient denies current or recent suicidal ideation or behaviors.  Patient denies current or recent homicidal ideation or behaviors.  Patient denies current or recent self injurious behavior or ideation.  Patient denies other safety concerns.  Recommended that patient call 911 or go to the local ED should there be a change in any of these risk factors.    Diagnostic Criteria:  F41.1:  A. Excessive anxiety and worry, occurring more days than not for at least 6 months about a number of events or activities.   B. The individual finds it difficult to control the worry.  C. The anxiety and worry are associated with 3 or more of 6 symptoms.  D. The anxiety, worry, or physical symptoms cause clinically significant distress or impairment in social, occupational, or other important areas of functioning.  E. The disturbance is not attributable to the physiological effects of a substance (e.g., a drug of abuse, a medication) or another  medical condition (e.g., hyperthyroidism).  F. The disturbance is not better explained by another mental disorder (e.g., anxiety or worry about having panic attacks in panic disorder, negative evaluation in social anxiety disorder [social phobia], contamination or other obsessions in obsessive-compulsive disorder, separation from attachment figures in separation anxiety disorder, reminders of traumatic events in posttraumatic stress disorder, gaining weight in anorexia nervosa, physical complaints in somatic symptom disorder, perceived appearance flaws in body dysmorphic disorder, having a serious illness in illness anxiety disorder, or the content of delusional beliefs in schizophrenia or delusional disorder).    F88:  A. A persistent pattern of inattention and/or hyperactivity-impulsivity that interferes with functioning or development, as characterized by (1) and/or (2):   1. Six or more inattention symptoms that have persisted for at least 6 months to a degree that is inconsistent with developmental level and that negatively impacts directly on social and academic/occupational activities.   2. Six or more hyperactivity and impulsivity symptoms that have persisted for at least 6 months to a degree that is inconsistent with developmental level and that negatively impacts directly on social and academic/occupational activities.  B. Several symptoms (inattentive or hyperactive/impulsive) were present before the age of 12 years.  C. Several symptoms (inattentive or hyperactive/impulsive) present in ?2 settings (eg, at home, school, or work; with friends or relatives; in other activities).  D. There is clear evidence that the symptoms interfere with or reduce the quality of social, academic, or occupational functioning.  E. Symptoms do not occur exclusively during the course of schizophrenia or another psychotic disorder, and are not better explained by another mental disorder (eg, mood disorder, anxiety disorder,  dissociative disorder, personality disorder, substance intoxication, or withdrawal).      DSM5 Diagnoses: (Sustained by DSM5 Criteria Listed Above)  Diagnoses:   1. Generalized anxiety disorder    2. Developmental mental disorder    Rule out depression  Psychosocial & Contextual Factors: social support, employed    PROMIS-10 Scores  Global Mental Health Score: (P) 11  Global Physical Health Score: (P) 14   PROMIS TOTAL - SUBSCORES: (P) 25      Intervention:              Reviewed symptoms and history of presenting concern. Patient endorsed symptoms consistent with depression , anxiety , and ADHD. Unclear if depression better attributed to ADHD and anxiety. Will continue to assess. Patient denied symptoms associated with joselito, panic, OCD, trauma, and perceptual difficulties. Unable to complete diagnostic intake, will be completed in next session.   CBT: socratic questioning, positive reinforcement  EFT: empathetic attunement, emotion checking, emotion naming  MI: open ended questions, affirmations, reflections        Attendance Agreement:  Client has not signed the attendance agreement. Discussed expectations at beginning of this first session and patient agreed.       PLAN:  Provider will continue Diagnostic Assessment in next session. Patient will complete Kristina questionnaires  and CNS Vital Signs prior to next session (12/29/2023).    Patient meets the following risk assessment and triage: Patient denied any current/recent/lifetime history of suicidal ideation and/or behaviors.  No safety plan indicated at this time.     Medical necessity criteria is warranted in order to: Measure a psychological disorder and its severity and functional impairment to determine psychiatric diagnosis when a mental illness is suspected, or to achieve a differential diagnosis from a range of medical/psychological disorders that present with similar constellations of symptoms (e.g., determination and measurement of anxiety severity  and impact in the presence of ongoing asthma or heart disease), Perform symptom measurement to objectively measure treatment effectiveness and/or determine the need to refer for pharmacological treatment or other medical evaluation (e.g., based on severity and chronicity of symptoms), and Evaluate primary symptoms of impaired attention and concentration that can occur in many neurological and psychiatric conditions.    Medical necessity for psychological assessment is warranted as a result of the following: (1) A specific clinical question is posed that relates to the condition/symptoms being addressed (2) The question cannot be adequately addressed by clinical interview and/or behavioral observation (3) Results of psychological testing are reasonably expected to provide an answer to the query (4) It is reasonably expected that the testing will provide information leading to a clearer diagnosis and/or guide treatment planning with an expectation of improved clinical outcome.    I acknowledge that, based upon current clinical information, the patient and I have reviewed and discussed issues pertaining to the purpose of therapy/testing, potential therapeutic goals, procedures, risks and benefits, and estimated duration of therapy/testing. Issues pertaining to fees/insurance and confidentiality were also addressed with the patient, who indicated understanding and elected to continue with appointments. I will not be providing any experimental procedures and, if we agree that a change in clinical procedure would be more beneficial, I will obtain specific consent for that procedure or refer you to another provider who has expertise in that area.       Cynthia Whyte PsyD,   Clinical Psychologist

## 2023-12-29 ENCOUNTER — VIRTUAL VISIT (OUTPATIENT)
Dept: PSYCHOLOGY | Facility: CLINIC | Age: 43
End: 2023-12-29
Payer: COMMERCIAL

## 2023-12-29 DIAGNOSIS — F41.1 GENERALIZED ANXIETY DISORDER: Primary | ICD-10-CM

## 2023-12-29 DIAGNOSIS — F88 DEVELOPMENTAL MENTAL DISORDER: ICD-10-CM

## 2023-12-29 PROCEDURE — 90791 PSYCH DIAGNOSTIC EVALUATION: CPT | Mod: 95 | Performed by: PSYCHOLOGIST

## 2023-12-29 NOTE — PROGRESS NOTES
[facility]  Provider Name:  Cynthia Whyte     Credentials:  JENNIFER Canales    PATIENT'S NAME: Ambar Jin  PREFERRED NAME: Geni  PRONOUNS: she/her  MRN: [id number]  : 1980  ADDRESS: [address] [place] [address]  [facility]  [id number]  DATE OF SERVICE: 23  START TIME: 10:00am  END TIME: 10:30am  PREFERRED PHONE: [phone]  SERVICE MODALITY:  Video Visit:      Provider verified identity through the following two step process.  Patient provided:  Patient  and Patient address    Telemedicine Visit: The patient's condition can be safely assessed and treated via synchronous audio and visual telemedicine encounter.      Reason for Telemedicine Visit: Services only offered telehealth    Originating Site (Patient Location): Patient's home    Distant Site (Provider Location): Provider Remote Setting- Home Office    Consent:  The patient/guardian has verbally consented to: the potential risks and benefits of telemedicine (video visit) versus in person care; bill my insurance or make self-payment for services provided; and responsibility for payment of non-covered services.     Patient would like the video invitation sent by:  Send to e-mail at: [email or web address]    Mode of Communication:  Video Conference via [employer]    Distant Location (Provider):  Off-site    As the provider I attest to compliance with applicable laws and regulations related to telemedicine.    UNIVERSAL ADULT Mental Health DIAGNOSTIC ASSESSMENT    Identifying Information:  Patient is a 43 year old,  woman. The pronoun use throughout this assessment reflects the patient's chosen pronoun. Patient was referred for an assessment by self. Patient attended the session alone.     Chief Complaint:   Patient reported seeking services at this time for diagnostic assessment and recommendations for treatment. Patient's presenting concerns include: continued problems with inattention.  "Specifically, the patient reported experiencing the following symptoms: making careless mistakes/problems attending to details, difficulty sustaining attention, not following through with tasks, losing things often, being easily distracted, being forgetful, and talks excessively/interrupts others.     Patient reported that she has not been assessed for ADHD in the past. Symptoms reportedly began in elementary school. The patient reported a history of anxiety symptoms that have \"always\" been present.  Patient indicated that she will ruminate on problems and worry about her abilities to complete tasks.  The patient stated that some of this worry leads to depression symptoms and feeling bad about herself.  As such, she reported a history of depression symptoms but stated that she does not know that this has necessarily been the true issue.  The patient is taking her prescribed Lexapro to help manage symptoms. Client reported that other professional(s) are involved in providing services, as she was referred by her PCP.    Social/Family History:  Patient reported she moved from [place], [place] to [facility] at 10 years old.. Patient was the first born of 4 total children.  One of the patient's younger siblings passed away as an infant, when the patient was 6 years old. There are no known complications during pregnancy or delivery. This is an intact family and parents remain . Patient reported no difficulty with childhood peer relationships. Reported that childhood was \"really good\" overall, but indicated that she did not necessarily feel understood by her parents given issues that were happening at school.  Also acknowledged engaging in some rebellious behavior as a teenager.. Described her current relationships with family of origin as supportive with frequent communication.      The patient denied a history of learning disorders, special education programming, and receiving tutoring services. " "Patient denied a history of head injuries in childhood, but did sustain 1 concussion 1.5 years ago.  No LOC associated with that event.. As a child, she reported \"never\" being good in school, as she seemed to be daydreaming and staring off into space.  The patient indicated that her mother recalled that this problem being discussed at parent-teacher conferences.  Organization was reportedly a \"disaster.\"  Indicated that she would also misplace/lose items.  In younger years, her parents needed to specifically sit her down to force her to complete homework but the work was not necessarily getting completed in middle school and high school. Recalled academic strengths in reading.  The patient's highest education level is GED.  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  She indicated that she understood the material and enjoyed her time while she was in class, but had significant difficulties getting herself to get to class.  Patient indicated that possible depression symptoms at the time may have been interfering with her attendance.    The patient describes her cultural background as White, American.  Cultural influences and impact on patient's life structure, values, norms, and healthcare: Cartesian. Patient identified her preferred language to be English. Patient reported she does not need the assistance of an  or other support involved in therapy.     Patient is currently  to her  of 2 years.  The patient indicated that he has made comments about her difficulties completing tasks and forgetfulness.  Impulsive spending has also been a problem and he is managing the finances. Patient identified their sexual orientation as heterosexual. Patient reported having zero child(abbie). Patient identified parents, siblings, pets, friends, spouse, and co-worker as part of her support system. Patient identified the quality of these relationships as stable and meaningful.      Patient " is staying in own home/apartment. She lives with her . Housing is stable.     Patient is currently employed full-time as a patient  at a physical therapy office.  Patient has been in this position for about 4 years.  She reported problems with missing details and needing to double check herself and zoning out during meetings. Patient reports finances are obtained through employment and spouse.     Patient has not served in the .     Patient reported that she has not been involved with the legal system. Patient denies being on probation / parole / under the jurisdiction of the court.    Patient has received a 's license. Patient reported being a safe .    Patient's Strengths and Limitations:  Patient identified the following strengths or resources that will help them succeed in treatment: exercise routine, friends / good social support, family support, insight, intelligence, positive work environment, motivation, strong social skills, and work ethic. Things that may interfere with the patient's success in treatment include: none identified.     Personal and Family Medical History:  Patient reported the following biological family members or relatives with mental health issues: Father experienced Depression, Mother experienced Depression, and Sister experienced Depression.  Patient previously received the following mental health diagnosis: an Anxiety Disorder and Depression.   Patient has received the following mental health services in the past: counseling.   Patient is currently receiving the following services: medication(s) from physician / PCP.  Hospitalizations: None.   Previous/Current commitments: None.     Patient has not had a physical exam to rule out medical causes for current symptoms. The patient's PCP is CHUY Greenwood CNP. Patient reported no current medical concerns. Patient denies any issues with pain.. There are not significant appetite/nutritional  concerns/weight changes.    Current Outpatient Medications   Medication    albuterol (PROAIR HFA/PROVENTIL HFA/VENTOLIN HFA) 108 (90 Base) MCG/ACT inhaler    cetirizine HCl (ZYRTEC ORAL)    cholecalciferol, vitamin D3, (VITAMIN D3 ORAL)    co-enzyme Q-10 100 MG CAPS capsule    Continuous Blood Gluc Sensor (FREESTYLE DOROTHY 3 SENSOR) MISC    escitalopram (LEXAPRO) 10 MG tablet    escitalopram (LEXAPRO) 20 MG tablet    famotidine (PEPCID) 20 MG tablet    fish oil-omega-3 fatty acids 1000 MG capsule    ibuprofen (ADVIL/MOTRIN) 200 MG tablet    lactobacillus rhamnosus, GG, (CULTURELL) capsule    metFORMIN (GLUCOPHAGE XR) 500 MG 24 hr tablet    phentermine (ADIPEX-P) 37.5 MG capsule    tirzepatide (MOUNJARO) 15 MG/0.5ML pen    traMADol (ULTRAM) 50 MG tablet    triamcinolone (KENALOG) 0.1 % external cream     No current facility-administered medications for this visit.       Client reports taking prescribed medications as prescribed.    Patient Allergies:   Allergies   Allergen Reactions    Alprazolam Other (See Comments)     Irritable, no memory of what happened    Clonazepam Dizziness     No memory of what happened while on medication       Medical History:   Past Medical History:   Diagnosis Date    Anxiety     Asthma     Depression     Diabetes mellitus, type 2 (H)     PCOS (polycystic ovarian syndrome)        Family history includes: family history includes Arthritis in her mother; Asthma in her sister; Cerebrovascular Disease in her maternal grandmother; Cerebrovascular Disease (age of onset: 60.00) in her mother; Depression in her father, mother, and sister; Diabetes in her maternal grandfather; Diabetes Type 2  in her father and mother; Early Death in her sister; Liver Disease in her sister; Lung Cancer in her maternal grandmother; Prostate Cancer in her paternal grandfather; Throat cancer in her maternal grandfather.  Excessive alcohol use and maternal grandfather.    Current Mental Status Exam:    Appearance:  Appropriate    Eye Contact:  Good   Psychomotor:  Normal       Gait / station:  no problem  Attitude / Demeanor: Cooperative   Speech      Rate / Production: Normal/ Responsive      Volume:  Normal  volume      Language:  intact  Mood:   Normal  Affect:   Appropriate    Thought Content: Clear   Thought Process: Logical       Associations: No loosening of associations  Insight:   Good   Judgment:  Intact   Orientation:  All  Attention/concentration: Good    Rating Scales:  PHQ9:        10/23/2023     5:03 PM 12/1/2023    10:05 AM 12/13/2023    10:54 AM   PHQ-9 SCORE   PHQ-9 Total Score MyChart 15 (Moderately severe depression) 11 (Moderate depression) 9 (Mild depression)   PHQ-9 Total Score 15 11 9       GAD7:        1/10/2022    12:58 PM 11/14/2023    10:34 AM 12/13/2023    10:55 AM   AMANDA-7 SCORE   Total Score  13 (moderate anxiety) 12 (moderate anxiety)   Total Score 2 13 12       Substance Use:  Patient did report a family history of substance use concerns; see medical history section for details. Patient has not received chemical dependency treatment in the past. Patient has not ever been to detox. Patient is not currently receiving any chemical dependency treatment. Patient reported  no current  problems as a result of her substance use.    Alcohol: Use in social settings about 1 time per month.  Indicated that she may have up to 4 beverages in those settings if she is attending an event but more likely to have 1-2 beverages if she is out to dinner.  Nicotine: None.  Cannabis: Experimentation in the past.  Current delta-9 Gummies about 2 times per month.  Caffeine: 2 cups of coffee per day.  Street Drugs: Experimentation with cocaine in the past.  Prescription Drugs: None.    CAGE: None of the patient's responses to the CAGE screening were positive / Negative CAGE score     Substance Use: No symptoms    Based on the negative CAGE score and clinical interview there are not indications of drug or  alcohol abuse.    Significant Losses/Trauma/Abuse/Neglect Issues:   There are indications or report of significant loss, trauma, abuse or neglect issues related to: are no indications and client denies any losses, trauma, abuse, or neglect concerns.  Concerns for possible neglect are not present.    Safety Assessment:   [place] Suicide Severity Rating Scale (Lifetime/Recent)[place] Suicide Severity Rating Scale (Lifetime/Recent)      12/13/2023     2:04 PM   [place] Suicide Severity Rating (Lifetime/Recent)   Q1 Wish to be Dead (Lifetime) N   Q2 Non-Specific Active Suicidal Thoughts (Lifetime) N   Actual Attempt (Lifetime) N   Has subject engaged in non-suicidal self-injurious behavior? (Lifetime) N   Interrupted Attempts (Lifetime) N   Aborted or Self-Interrupted Attempt (Lifetime) N   Preparatory Acts or Behavior (Lifetime) N   Calculated C-SSRS Risk Score (Lifetime/Recent) No Risk Indicated     Patient denies current homicidal ideation and behaviors.  Patient denies current self-injurious ideation and behaviors.    Patient denied risk behaviors associated with substance use.  Patient denies any high risk behaviors associated with mental health symptoms.  Patient reports the following current concerns for their personal safety: None.  Patient reports there are not firearms in the house.     History of Safety Concerns:  Patient denied a history of homicidal ideation.     Patient denied a history of personal safety concerns.    Patient denied a history of assaultive behaviors.    Patient denied a history of sexual assault behaviors.     Patient denied a history of risk behaviors associated with substance use.  Patient denies any history of high risk behaviors associated with mental health symptoms.  Patient reports the following protective factors: forward or future oriented thinking; dedication to family or friends; safe and stable environment; regular sleep; effectively controls impulses; sense of belonging;  purpose; secure attachment; living with other people; daily obligations; structured day; effective problem solving skills; commitment to well being; sense of meaning; positive social skills; healthy fear of risky behaviors or pain; financial stability; access to a variety of clinical interventions and pets    Risk Plan:  See Recommendations for Safety and Risk Management Plan below.    Review of Patient-Reported Symptoms:  Depression: Change in sleep, Lack of interest, Change in energy level, Difficulties concentrating, and Low self-worth  Radha:  No Symptoms  Psychosis: No Symptoms  Anxiety: Excessive worry, Nervousness, Ruminations, Poor concentration, and Irritability  Panic:  No symptoms  Post Traumatic Stress Disorder:  No Symptoms   Eating Disorder: No Symptoms  ADD / ADHD:  Inattentive, Poor task completion, Distractibility, Forgetful, Interrupts, and Hyperverbal  Conduct Disorder: No symptoms  Autism Spectrum Disorder: No observed symptoms. An autism spectrum disorder diagnosis requires specialized assessment.  Obsessive Compulsive Disorder: No Symptoms  Patient reports the following compulsive behaviors and treatment history:  No symptoms .      Diagnostic Criteria:   F41.1:  A. Excessive anxiety and worry, occurring more days than not for at least 6 months about a number of events or activities.   B. The individual finds it difficult to control the worry.  C. The anxiety and worry are associated with 3 or more of 6 symptoms.  D. The anxiety, worry, or physical symptoms cause clinically significant distress or impairment in social, occupational, or other important areas of functioning.  E. The disturbance is not attributable to the physiological effects of a substance (e.g., a drug of abuse, a medication) or another medical condition (e.g., hyperthyroidism).  F. The disturbance is not better explained by another mental disorder (e.g., anxiety or worry about having panic attacks in panic disorder, negative  evaluation in social anxiety disorder [social phobia], contamination or other obsessions in obsessive-compulsive disorder, separation from attachment figures in separation anxiety disorder, reminders of traumatic events in posttraumatic stress disorder, gaining weight in anorexia nervosa, physical complaints in somatic symptom disorder, perceived appearance flaws in body dysmorphic disorder, having a serious illness in illness anxiety disorder, or the content of delusional beliefs in schizophrenia or delusional disorder).    F88:  A. A persistent pattern of inattention and/or hyperactivity-impulsivity that interferes with functioning or development, as characterized by (1) and/or (2):   1. Six or more inattention symptoms that have persisted for at least 6 months to a degree that is inconsistent with developmental level and that negatively impacts directly on social and academic/occupational activities.   2. Six or more hyperactivity and impulsivity symptoms that have persisted for at least 6 months to a degree that is inconsistent with developmental level and that negatively impacts directly on social and academic/occupational activities.  B. Several symptoms (inattentive or hyperactive/impulsive) were present before the age of 12 years.  C. Several symptoms (inattentive or hyperactive/impulsive) present in ?2 settings (eg, at home, school, or work; with friends or relatives; in other activities).  D. There is clear evidence that the symptoms interfere with or reduce the quality of social, academic, or occupational functioning.  E. Symptoms do not occur exclusively during the course of schizophrenia or another psychotic disorder, and are not better explained by another mental disorder (eg, mood disorder, anxiety disorder, dissociative disorder, personality disorder, substance intoxication, or withdrawal).    Functional Status:  Patient reports the following functional impairments: management of the household and or  completion of tasks, money management, and work / vocational responsibilities.       PROMIS-10 Scores        11/14/2023    10:36 AM 12/13/2023    10:57 AM   PROMIS-10 Total Score w/o Sub Scores   PROMIS TOTAL - SUBSCORES 22 25       Nonprogrammatic care: Patient is requesting basic services to address current mental health concerns.    Clinical Summary:  1. Reason for assessment: assessing reported deficits in executive functioning (rule in/out ADHD).  2. Psychosocial, cultural and contextual factors: Social support, employed full-time.  3. Principal DSM-5 diagnoses (Sustained by DSM5 Criteria Listed Above) and other diagnoses relevant to this service:   1. Generalized anxiety disorder    2. Developmental mental disorder      4. Prognosis: Expect Improvement.  5. Likely consequences of symptoms if not treated: Continued difficulties with inattention.  6. Client strengths include: employed, has a previous history of therapy, insightful, intelligent, motivated, support of family, friends and providers, and supportive .     Recommendations:   Final feedback session scheduled for 1/25/2024.    1. Plan for Safety and Risk Management:  Safety and Risk: Recommended that patient call 911 or go to the local ED should there be a change in any of these risk factors..         Report to child / adult protection services was NA.     2. Patient's identified mental health concerns with a cultural influence will be addressed in final recommendations.     3. Initial Treatment will focus on: ADHD testing. See above.      4. Resources/Service Plan:    services are not indicated.   Modifications to assist communication are not indicated.   Additional disability accommodations are not indicated.      5. Collaboration:   Collaboration / coordination of treatment will be initiated with the following  support professionals: primary care physician.      6.  Referrals:   The following referral(s) will be initiated: N/A.    A  Release of Information has been obtained for the following: N/A.   Emergency Contact was not obtained.    Clinical Substantiation/medical necessity for the above recommendations:     Medical necessity criteria is warranted in order to: Measure a psychological disorder and its severity and functional impairment to determine psychiatric diagnosis when a mental illness is suspected, or to achieve a differential diagnosis from a range of medical/psychological disorders that present with similar constellations of symptoms (e.g., determination and measurement of anxiety severity and impact in the presence of ongoing asthma or heart disease), Perform symptom measurement to objectively measure treatment effectiveness and/or determine the need to refer for pharmacological treatment or other medical evaluation (e.g., based on severity and chronicity of symptoms), and Evaluate primary symptoms of impaired attention and concentration that can occur in many neurological and psychiatric conditions.    Medical necessity for psychological assessment is warranted as a result of the following: (1) A specific clinical question is posed that relates to the condition/symptoms being addressed (2) The question cannot be adequately addressed by clinical interview and/or behavioral observation (3) Results of psychological testing are reasonably expected to provide an answer to the query (4) It is reasonably expected that the testing will provide information leading to a clearer diagnosis and/or guide treatment planning with an expectation of improved clinical outcome.    7. CALI:    CALI: N/A.     8. Records:   These were reviewed at time of assessment.   Information in this assessment was obtained from the medical record and  provided by patient who is a good historian. Patient will have open access to their mental health medical record.    9. Interactive Complexity: No     Parts of this documentation may have been completed using dictation  software. Potential errors may result and are unintentional.       Cynthia Whyte PsyD, LP  December 29, 2023

## 2024-01-08 NOTE — COMMUNITY RESOURCES LIST (ENGLISH)
01/08/2024   [facility]  [facility]/A  For questions about this resource list or additional care needs, please contact your primary care clinic or care manager.  Phone: [phone]   Email: N/A   Address: [facility] [place] [address]   Hours: N/A        Hotlines and Helplines       Hotline - Housing crisis  1  [facility] Distance: 8.45 miles      Phone/Virtual   [address] [place] [address]  Language: English  Hours: Mon - Sun Open 24 Hours   Phone: (464) 811-2943 Email: communications@[facility].org Website: https://[facility]-[place].org/oursaviourshousing/     2  [facility] (Main Office) Distance: 8.51 miles      Phone/Virtual   [address] [place] [address]  Language: English  Hours: Mon - Sun Open 24 Hours   Phone: (787) 769-9523 Email: info@[facility].org Website: http://[facility].org          Housing       Coordinated Entry access point  3  [facility] Distance: 2.45 miles      In-Person, Phone/Virtual   [address] [place] [address]  Language: English  Hours: Mon - Fri 8:30 AM - 4:30 PM  Fees: Free   Phone: (366) 358-1829 Email: info@[facility].org Website: https://www.[facility].org/locations/[facility]/     4  [facility] - Housing Services Distance: 8.57 miles      In-Person   [address] [place] [address]  Language: English  Hours: Mon - Fri 9:00 AM - 5:00 PM  Fees: Free   Phone: (450) 227-6774 Email: housing@[facility].org Website: http://www.[facility].org/housing     Drop-in center or day shelter  5  [facility] Distance: 3.58 miles      In-Person   [address] [place] [address]  Language: English  Hours: Mon - Fri 9:00 AM - 4:00 PM  Fees: Free   Phone: (634) 823-2971 Email: kelley@[employer].org Website: http://listening[facility].org     6  [facility] Distance: 8.66 miles       In-Person   [address] [place] [address]  Language: English, Bruneian, Equatorial Guinean  Hours: Mon - Sat 7:00 AM - 3:00 PM  Fees: Free, Self Pay   Phone: (124) 855-8035 Email: info@[employer] Website: https://www.[facility]/locations/opportunity-center/     Housing search assistance  7  [facility]ging and Disability Services Distance: 2.06 miles      In-Person   [address] [place] [address]  Language: English  Hours: Mon - Fri 8:00 AM - 4:00 PM  Fees: Free, Insurance, Sliding Fee   Phone: (623) 518-9887 Email: piedad@[facility] Website: https://www.[facility]./HealthFamily/Disabilities     8  [facility] - Online Housing Search Assistance Distance: 2.06 miles      Phone/Virtual [address] [place] [address]  Language: English  Hours: Mon - Sun Open 24 Hours  Fees: Free   Phone: (161) 832-2097 Email: yas@[employer] Website: https://[employer]/     Shelter for families  9  [facility] Distance: 5.72 miles      In-Person   [address] [place] [address]  Language: English  Hours: Mon - Sun Open 24 Hours  Fees: Free, Sliding Fee   Phone: (503) 753-7753 Ext.1 Email: info@[facility] Website: http://www.[facility] Network[facility].[employer] Mail     10  [facility] Distance: 17.84 miles      In-Person   [address] [place] [address]  Language: English  Hours: Mon - Fri 3:00 PM - 9:00 AM , Sat - Sun Open 24 Hours  Fees: Free   Phone: (762) 152-2145 Ext.1 Website: https://www.[facility].org/2020/07/03/emergency-family-shelter/     Shelter for individuals  11  [facility] [place] [facility] [place] [facility] Distance: 2.46 miles      In-Person   [address] [place] [address]  Language: English  Hours: Mon - Sun 5:00 PM - 10:00 AM  Fees: Free, Self Pay   Phone: (670) 473-5595 Email: info@[employer].org  Website: https://www.[facility].org/locations/[facility]/     12  [facility] Distance: 8.45 miles      In-Person   [address] [place] [address]  Language: English  Hours: Mon - Sun Open 24 Hours  Fees: Free   Phone: (799) 340-8406 Email: communications@[facility].org Website: https://[facility].org/oursaviourshousing/          Important Numbers & Websites       Emergency Services   [address]  [place] [address]  Poison Control   (636) 488-2925  Suicide Prevention Lifeline   (100) 916-2998 (TALK)  Child Abuse Hotline   (145) 180-8007 (4-A-Child)  Sexual Assault Hotline   (334) 179-4327 (HOPE)  National Runaway Safeline   (791) 764-6637 (RUNAWAY)  All-Options Talkline   (202) 348-1470  Substance Abuse Referral   (761) 200-9264 (HELP)

## 2024-01-09 ENCOUNTER — OFFICE VISIT (OUTPATIENT)
Dept: INTERNAL MEDICINE | Facility: CLINIC | Age: 44
End: 2024-01-09
Payer: COMMERCIAL

## 2024-01-09 VITALS
DIASTOLIC BLOOD PRESSURE: 83 MMHG | TEMPERATURE: 96.1 F | SYSTOLIC BLOOD PRESSURE: 126 MMHG | HEART RATE: 86 BPM | WEIGHT: 255.3 LBS | OXYGEN SATURATION: 100 % | RESPIRATION RATE: 17 BRPM | BODY MASS INDEX: 38.69 KG/M2 | HEIGHT: 68 IN

## 2024-01-09 DIAGNOSIS — R20.0 NUMBNESS AND TINGLING IN RIGHT HAND: Primary | ICD-10-CM

## 2024-01-09 DIAGNOSIS — R20.2 NUMBNESS AND TINGLING IN RIGHT HAND: Primary | ICD-10-CM

## 2024-01-09 PROCEDURE — 99213 OFFICE O/P EST LOW 20 MIN: CPT | Performed by: INTERNAL MEDICINE

## 2024-01-09 NOTE — PROGRESS NOTES
Assessment & Plan     (R20.0,  R20.2) Numbness and tingling in right hand  (primary encounter diagnosis)  Plan: Possible carpal tunnel syndrome, will obtain EMG.pt was told I will contact her after results and proceed accordingly.  Advised to use wrist brace          Ordering of each unique test       Phani Nolan MD  [facility] KEVIN Arechiga is a 43 year old, presenting for the following health issues:  Consult      1/9/2024     9:19 AM   Additional Questions   Roomed by ashly   Accompanied by self         1/9/2024     9:19 AM   Patient Reported Additional Medications   Patient reports taking the following new medications none       History of Present Illness       Reason for visit:  Hand, wrist and arm pain and numbness.  Symptom onset:  More than a month  Symptoms include:  Pain and numbness.  Symptom intensity:  Moderate  Symptom progression:  Worsening  Had these symptoms before:  No  What makes it worse:  Sitting at my desk and laying in bed.  What makes it better:  Not that I ve found yet.    She eats 2-3 servings of fruits and vegetables daily.She consumes 0 sweetened beverage(s) daily.She exercises with enough effort to increase her heart rate 10 to 19 minutes per day.  She exercises with enough effort to increase her heart rate 4 days per week.   She is taking medications regularly.      Pt is a 43 year old female who is seen here to day with c/o pain and numbness of Rt  hand mainly first 3 fingers since 1 yr but more since 1  month,says she feels weak in that hand,cannot grasp things tight, not dropping things.  Symptoms more night.      Past Medical History:   Diagnosis Date    Anxiety     Asthma     Depression     Diabetes mellitus, type 2 (H)     PCOS (polycystic ovarian syndrome)        Current Outpatient Medications   Medication Sig Dispense Refill    cetirizine HCl (ZYRTEC ORAL) Take 10 mg by mouth daily      cholecalciferol, vitamin D3, (VITAMIN D3  "ORAL) Take 2,000 Units by mouth daily      co-enzyme Q-10 100 MG CAPS capsule Take 100 mg by mouth daily      escitalopram (LEXAPRO) 10 MG tablet TAKE 1 TABLET BY MOUTH DAILY ALONG WITH 20MG TABLET. 90 tablet 0    escitalopram (LEXAPRO) 20 MG tablet TAKE 1 TABLET DAILY 90 tablet 3    fish oil-omega-3 fatty acids 1000 MG capsule Take 2 g by mouth daily      lactobacillus rhamnosus, GG, (CULTURELL) capsule Take 1 capsule by mouth daily      metFORMIN (GLUCOPHAGE XR) 500 MG 24 hr tablet TAKE 4 TABLETS DAILY WITH DINNER 360 tablet 3    phentermine (ADIPEX-P) 37.5 MG capsule Take 1 capsule (37.5 mg) by mouth every morning 30 capsule 0    triamcinolone (KENALOG) 0.1 % external cream Apply topically 2 times daily 30 g 0    albuterol (PROAIR HFA/PROVENTIL HFA/VENTOLIN HFA) 108 (90 Base) MCG/ACT inhaler Inhale 2 puffs into the lungs every 6 hours (Patient not taking: Reported on 1/9/2024) 18 g 1    Continuous Blood Gluc Sensor (FREESTYLE DOROTHY 3 SENSOR) MISC 1 each every 14 days (Patient not taking: Reported on 1/9/2024) 6 each 3    famotidine (PEPCID) 20 MG tablet Take 20 mg by mouth 2 times daily as needed (Patient not taking: Reported on 1/9/2024)      ibuprofen (ADVIL/MOTRIN) 200 MG tablet Take 600-800 mg by mouth daily as needed HOLD. (Patient not taking: Reported on 1/9/2024)      tirzepatide (MOUNJARO) 15 MG/0.5ML pen Inject 15 mg Subcutaneous every 7 days (Patient not taking: Reported on 1/9/2024) 2 mL 0    traMADol (ULTRAM) 50 MG tablet Take 1 tablet (50 mg) by mouth every 6 hours as needed (migraine) (Patient not taking: Reported on 1/9/2024) 30 tablet 0        Review of Systems   CONSTITUTIONAL: NEGATIVE for fever, chills,    NEURO: paresthesias right hand      Objective    /83   Pulse 86   Temp (!) 96.1  F (35.6  C) (Tympanic)   Resp 17   Ht 1.727 m (5' 8\")   Wt 115.8 kg (255 lb 4.8 oz)   LMP 12/24/2023   SpO2 100%   BMI 38.82 kg/m    Body mass index is 38.82 kg/m .  Physical Exam   GENERAL: " healthy, alert and no distress  NEURO: Normal strength and tone, sensory exam grossly normal, and Tinel's and Phalen signs positive on the right hand

## 2024-01-10 ENCOUNTER — MYC REFILL (OUTPATIENT)
Dept: PEDIATRICS | Facility: CLINIC | Age: 44
End: 2024-01-10
Payer: COMMERCIAL

## 2024-01-10 DIAGNOSIS — E66.01 MORBID OBESITY (H): ICD-10-CM

## 2024-01-10 RX ORDER — PHENTERMINE HYDROCHLORIDE 37.5 MG/1
37.5 CAPSULE ORAL EVERY MORNING
Qty: 30 CAPSULE | Refills: 0 | Status: SHIPPED | OUTPATIENT
Start: 2024-01-10 | End: 2024-02-01

## 2024-01-12 ENCOUNTER — TRANSFERRED RECORDS (OUTPATIENT)
Dept: MULTI SPECIALTY CLINIC | Facility: CLINIC | Age: 44
End: 2024-01-12
Payer: COMMERCIAL

## 2024-01-12 LAB — RETINOPATHY: NORMAL

## 2024-01-18 ASSESSMENT — ANXIETY QUESTIONNAIRES
2. NOT BEING ABLE TO STOP OR CONTROL WORRYING: SEVERAL DAYS
1. FEELING NERVOUS, ANXIOUS, OR ON EDGE: SEVERAL DAYS
5. BEING SO RESTLESS THAT IT IS HARD TO SIT STILL: NOT AT ALL
7. FEELING AFRAID AS IF SOMETHING AWFUL MIGHT HAPPEN: NOT AT ALL
6. BECOMING EASILY ANNOYED OR IRRITABLE: MORE THAN HALF THE DAYS
GAD7 TOTAL SCORE: 6
GAD7 TOTAL SCORE: 6
8. IF YOU CHECKED OFF ANY PROBLEMS, HOW DIFFICULT HAVE THESE MADE IT FOR YOU TO DO YOUR WORK, TAKE CARE OF THINGS AT HOME, OR GET ALONG WITH OTHER PEOPLE?: SOMEWHAT DIFFICULT
3. WORRYING TOO MUCH ABOUT DIFFERENT THINGS: SEVERAL DAYS
4. TROUBLE RELAXING: SEVERAL DAYS
7. FEELING AFRAID AS IF SOMETHING AWFUL MIGHT HAPPEN: NOT AT ALL
GAD7 TOTAL SCORE: 6
IF YOU CHECKED OFF ANY PROBLEMS ON THIS QUESTIONNAIRE, HOW DIFFICULT HAVE THESE PROBLEMS MADE IT FOR YOU TO DO YOUR WORK, TAKE CARE OF THINGS AT HOME, OR GET ALONG WITH OTHER PEOPLE: SOMEWHAT DIFFICULT

## 2024-01-19 ENCOUNTER — OFFICE VISIT (OUTPATIENT)
Dept: NEUROLOGY | Facility: CLINIC | Age: 44
End: 2024-01-19
Attending: INTERNAL MEDICINE
Payer: COMMERCIAL

## 2024-01-19 DIAGNOSIS — G56.01 CARPAL TUNNEL SYNDROME OF RIGHT WRIST: Primary | ICD-10-CM

## 2024-01-19 DIAGNOSIS — R20.0 NUMBNESS AND TINGLING IN RIGHT HAND: ICD-10-CM

## 2024-01-19 DIAGNOSIS — R20.2 NUMBNESS AND TINGLING IN RIGHT HAND: ICD-10-CM

## 2024-01-19 PROCEDURE — 95886 MUSC TEST DONE W/N TEST COMP: CPT | Mod: RT | Performed by: PSYCHIATRY & NEUROLOGY

## 2024-01-19 PROCEDURE — 95909 NRV CNDJ TST 5-6 STUDIES: CPT | Performed by: PSYCHIATRY & NEUROLOGY

## 2024-01-19 NOTE — PROCEDURES
[facility]    Formerly [facility]  [address] [place] [address], [address]  [place] [address]  Tel: [phone]  Fax: [phone]          Full Name: Geni Jin Gender: Female  Patient ID: [id number] YOB: 1980      Visit Date: 1/19/2024 07:36  Age: 43 Years 2 Months Old  Interpreted By: Mu Ward MD   Ref Dr.: Phani Nolan MD  Tech: ST   Height: 5 feet 9 inch  Reason for referral: Evaluate right upper. c/o numbness, pain right hand/wrist for a year. Diabetic > 3 years.      Motor NCS      Nerve / Sites Lat Amp Dist Bipin    ms mV cm m/s   R Median - APB      Wrist 4.58 7.5 7       Elbow 9.74 7.6 27 52   R Ulnar - ADM      Wrist 2.97 9.4 7       B.Elbow 6.77 8.3 23 60      A.Elbow 8.96 8.0 13 59       F  Wave      Nerve Fmin    ms   R Ulnar - ADM 29.38       Sensory NCS      Nerve / Sites Onset Lat Pk Lat Amp.2-3 Dist Bipin    ms ms  V cm m/s   R Median - II (Antidr)      Wrist 3.33 4.17 32.7 13 39   R Ulnar - V (Antidr)      Wrist 2.03 2.71 40.0 11 54   R Median, Ulnar - Transcarpal comparison      Median Palm 2.34 2.97 84.7 8 34      Ulnar Palm 1.35 1.67 36.2 8 59       EMG Summary Table     Spontaneous MUAP Rcmt Note   Muscle Fib PSW Fasc IA # Amp Dur PPP Rate Type   R. Brachioradialis None None None N N N N N N N   R. Pronator teres None None None N N N N N N N   R. Biceps brachii None None None N N N N N N N   R. Deltoid None None None N N N N N N N   R. Triceps brachii None None None N N N N N N N   R. Flexor carpi ulnaris None None None N N N N N N N   R. First dorsal interosseous None None None N N N N N N N   R. Abductor pollicis brevis None None None N N N N N N N         SUMMARY  Nerve conduction and EMG study of right upper extremity shows:    Prolonged right median nerve distal motor latency with normal amplitude and conduction velocity.  Normal right ulnar distal motor latency, amplitude and conduction  velocity.  Abnormal right median and normal right ulnar sensory SNAPs.  Prolonged right median-ulnar transcarpal peak latency comparison.  Monopolar needle exam is normal.      CLINICAL INTERPRETATION:  This is an abnormal nerve conduction and EMG study.  The study is suggestive of a right mild median neuropathy (suspected carpal tunnel syndrome).  Further clinical correlation is needed.     Mu Ward MD  Neurologist  [facility] Neurology [facility]  Tel:- [phone]

## 2024-01-19 NOTE — LETTER
1/19/2024         RE: Ambar Jin  [address] [place] [address]        Dear Colleague,    Thank you for referring your patient, Ambar Jin, to the [facility] [place]. Please see a copy of my visit note below.    See procedure note.       Again, thank you for allowing me to participate in the care of your patient.        Sincerely,        Mu Ward MD

## 2024-01-24 ASSESSMENT — PATIENT HEALTH QUESTIONNAIRE - PHQ9
SUM OF ALL RESPONSES TO PHQ QUESTIONS 1-9: 8
SUM OF ALL RESPONSES TO PHQ QUESTIONS 1-9: 8
10. IF YOU CHECKED OFF ANY PROBLEMS, HOW DIFFICULT HAVE THESE PROBLEMS MADE IT FOR YOU TO DO YOUR WORK, TAKE CARE OF THINGS AT HOME, OR GET ALONG WITH OTHER PEOPLE: VERY DIFFICULT

## 2024-01-25 ENCOUNTER — VIRTUAL VISIT (OUTPATIENT)
Dept: PSYCHOLOGY | Facility: CLINIC | Age: 44
End: 2024-01-25
Payer: COMMERCIAL

## 2024-01-25 DIAGNOSIS — F41.1 GENERALIZED ANXIETY DISORDER: ICD-10-CM

## 2024-01-25 DIAGNOSIS — F90.0 ATTENTION DEFICIT HYPERACTIVITY DISORDER, INATTENTIVE TYPE, MODERATE: Primary | ICD-10-CM

## 2024-01-25 PROCEDURE — 96131 PSYCL TST EVAL PHYS/QHP EA: CPT | Mod: 95 | Performed by: PSYCHOLOGIST

## 2024-01-25 PROCEDURE — 96130 PSYCL TST EVAL PHYS/QHP 1ST: CPT | Mod: 95 | Performed by: PSYCHOLOGIST

## 2024-01-25 NOTE — Clinical Note
Hello,  I wanted to let you know that I have completed the ADHD assessment with this patient.  As you will see the report, data do support an ADHD diagnosis.  I encouraged her to make an appointment with you soon to discuss medication options.  Please let me know if you have any questions or concerns!  Thanks!   Cynthia

## 2024-01-25 NOTE — PROGRESS NOTES
[facility]   Mental Health & Addiction Services     Progress Note - ADHD Feedback Session     Patient Name: Abmar Jin  Date: 2024       Service Type:  Individual       Session Start Time: 3:00pm  Session End Time:  3:18pm     Session Length: 18 minutes    Session #: 3    Attendees: Patient attended alone    Service Modality: Video Visit:      Provider verified identity through the following two step process.  Patient provided:  Patient  and Patient address    Telemedicine Visit: The patient's condition can be safely assessed and treated via synchronous audio and visual telemedicine encounter.      Reason for Telemedicine Visit: Services only offered telehealth    Originating Site (Patient Location): Patient's home    Distant Site (Provider Location): Provider Remote Setting- Home Office    Consent:  The patient/guardian has verbally consented to: the potential risks and benefits of telemedicine (video visit) versus in person care; bill my insurance or make self-payment for services provided; and responsibility for payment of non-covered services.     Patient would like the video invitation sent by:  Send to e-mail at: [email or web address]    Mode of Communication:  Video Conference via Amwell    Distant Location (Provider):  Off-site    As the provider I attest to compliance with applicable laws and regulations related to telemedicine.          2023    10:05 AM 2023    10:54 AM 2024     3:53 PM   PHQ   PHQ-9 Total Score 11 9 8   Q9: Thoughts of better off dead/self-harm past 2 weeks Not at all Not at all Not at all           2023    10:34 AM 2023    10:55 AM 2024     4:59 PM   AMANDA-7 SCORE   Total Score 13 (moderate anxiety) 12 (moderate anxiety) 6 (mild anxiety)   Total Score 13 12 6         DATA      Progress Since Last Session (Related to Symptoms / Goals / Homework):   Symptoms:  Stable.    Homework: Completed.      Treatment Objective(s) Addressed in This Session:   Provided feedback on ADHD evaluation. Reviewed test results in depth. Plan of care and recommendations were discussed based on testing data. See full report attached on secondary note in this encounter.     Intervention:   Provided feedback to patient regarding testing results, diagnoses, and treatment recommendations. Test results are consistent with an ADHD diagnosis. Symptoms are not better explained by an anxiety disorder. Personalized suggestions regarding symptoms were offered. Patient had the opportunity to ask questions; she expressed understanding.        ASSESSMENT: Current Emotional / Mental Status (status of significant symptoms):   Risk status (Self / Other harm or suicidal ideation)   Patient denies current fears or concerns for personal safety.   Patient denies current or recent suicidal ideation or behaviors.   Patient denies current or recent homicidal ideation or behaviors.   Patient denies current or recent self injurious behavior or ideation.   Patient denies other safety concerns.   Patient reports there has been no change in risk factors since their last session.     Patient reports there has been no change in protective factors since their last session.     Recommended that patient call 911 or go to the local ED should there be a change in any of these risk factors.     Appearance:   Appropriate    Eye Contact:   Good    Psychomotor Behavior: Normal    Attitude:   Cooperative    Orientation:   All   Speech    Rate / Production: Normal     Volume:  Normal    Mood:    Normal   Affect:    Appropriate    Thought Content:  Clear    Thought Form:  Coherent  Logical    Insight:    Good      Medication Review:   No changes to current psychiatric medication(s)     Medication Compliance:   Yes     Changes in Health Issues:   None reported     Chemical Use Review:   Substance Use: See report.      Nicotine Use: No  current tobacco use.      Diagnosis:  1. Attention deficit hyperactivity disorder, inattentive type, moderate    2. Generalized anxiety disorder        PLAN:   Recommendations are outlined in full evaluation report (attached to this encounter).   Patient indicated understanding and will contact the clinic if there are further questions.    Report routed to referring provider.    Parts of this documentation may have been completed using dictation software. Potential errors may result and are unintentional.       Cynthia Whyte PsyD, LP  Clinical Psychologist         Psychological Testing Services Summary       Testing Evaluation Services Base: 21722  (first 60 mins) Add-on: 91670  (each addtl 60 mins)   Record Review and Clarify Referral Question   1:50pm-2:00pm on 12/13/2023 10 minutes   Clinical Decision Making/Battery Modification   9:45am-10:00am on 12/29/2023 15 minutes   Integration/Report Generation   12:00pm-1:00pm on 1/24/2024 ([facility])  9:15am-9:45am on 1/24/2024 ([facility] Vital Signs)  1:00pm-2:00pm on 1/24/2024 (Final Report)   60 minutes  30 minutes  60 minutes   Interactive Feedback Session   3:00pm-3:18pm on 1/25/2024 18 minutes   Post-Service Work   3:18pm-3:33pm on 1/25/2024 15 minutes   Total Time: 208 minutes   Total Units: 1 2       Diagnoses:   F90.0 Attention-Deficit/Hyperactivity Disorder, inattentive presentation, moderate  F41.1 Generalized Anxiety Disorder

## 2024-01-25 NOTE — PROGRESS NOTES
"    Psychological Assessment Report    Patient: Ambar Jin (Becca)  YOB: 1980  MRN: [id number]  Date(s) of assessment: 12/13/2023 and 12/29/2023  Referral Source: self  Reason for Referral: assessing reported deficits in executive functioning     IDENTIFYING INFORMATION AND BRIEF HISTORY OF PRESENTING PROBLEM: Geni Jin is a 43-year-old White woman who presented to the initial diagnostic intake appointments on 12/13/2023 and 12/29/2023 (see diagnostic intake dated 12/29/2023 for more detailed background information) due to primary concerns with inattention.  The patient reported that the more she learned about ADHD, the more it feels relevant for her.    As a child, the patient reported that she was \"never good in school,\" and seemed to be daydreaming and staring off into space.  She indicated that she did better with 1-1 instruction.  Went on to describe she often did not complete homework unless her parents forced her to sit down and do it.  Stated that organization was a \"disaster.\"  She went on to explain that her parents put her in individual psychotherapy when she was 15 years old because she was not doing well in class and daydreaming.  Problems were \"chalked up to\" depression.  However, the patient indicated that she does not believe that was ever the main issue.  She dropped out of high school and earned her GED.  Did attend [employer] for a time but ultimately did not finish due to lack of attendance.  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  She reported having some problems with missing details and not paying attention during meetings.  Currently, the patient reported the following symptoms: Difficulty making careless mistakes/not paying attention to details, difficulty sustaining attention, does not follow through with tasks, difficulty organizing herself at home, losing things often, is easily distracted, is " "forgetful, talks excessively, blurts out information, and often interrupts others.  The patient is seeking diagnostic clarification and updated treatment recommendations.    Mental Health History: The patient reported a history of anxiety symptoms that have \"always\" been present.  She stated that she often has difficulty completing tasks which results in worry about those tasks.  She then experiences some depression symptoms and feels bad about herself as a result.  Although she has been diagnosed with depression in the past, she stated that she is unsure if this was ever truly an issue.  Did report current depression and anxiety symptoms and is taking her prescribed Lexapro to help manage those.  The patient denied a history of manic symptoms, social anxiety, phobic responses, symptoms of obsessive-compulsive disorder, trauma, and perceptual difficulties. The patient denied issues with sexual compulsivity, gambling, and disordered eating.    Developmental History: The patient reported that she believes that she is the product of a full-term pregnancy and there were no complications during her mother's pregnancy or birth. The patient reported that she believes she met all of her developmental milestones on time. She denied a history of head injuries in childhood, learning disorders, and special educational programming. The patient recalled academic strengths in reading.  The patient reported that she grew up with her mother, father, younger sister, and younger brother in the home.  Parents remain .  Describes that she had a \"really good\" childhood but did not necessarily feel understood given the issues at school.    Chemical Dependence/Substance Abuse History: The patient denied a history of chemical or substance abuse.  Reported that she uses alcohol about 1 time per month and has about 3-4 beverages in those instances.  Uses delta 9 gummies about 2 times per month.     SOURCES OF DATA/ASSESSMENT: Review of " medical and psychiatric records, consideration of behavioral observations during the testing (if applicable), and the results of the psychological tests are all considered in the preparation of this psychological test report. It is important to note that test results comprise a hypothesis of the patient's mental health concerns and are not an independent or conclusive assessment. Test results are combined with the patient's available medical, psychological, behavioral data for an integrated interpretation and report. Due to virtual/remote administration, certain aspects of the assessment process were impacted, such as access to direct patient observation, and maintaining an environment conducive to testing. As such, external factors have the potential to affect the validity of data collected.    TESTS ADMINISTERED:  CNS Vital Signs Neurocognitive Battery  Kristina Adult ADHD Rating Scale-IV: Self and Other Reports (BAARS-IV)  Kristina Functional Impairment Scale: Self and Other Reports (BFIS)  Kristina Deficits in Executive Functioning Scale (BDEFS)  Generalized Anxiety Disorder Questionnaire (AMANDA-7)  Patient Health Questionnaire- 9 (PHQ-9)    BEHAVIORAL OBSERVATIONS: The patient was pleasant and cooperative throughout all interview and explanation of testing process. The patient was oriented to person, place, and time. Mood was neutral. Eye contact was adequate and speech was at normal rate and rhythm. Motor activity was appropriate. Due to virtual/remote administration, direct patient observation was not possible during the testing process, and it is unknown if the patient was able to maintain an environment conducive to testing. As such, external factors have the potential to affect the validity of data collected.     TEST RESULTS: Test results comprise a hypothesis of the patient's mental health concerns and are not an independent or conclusive assessment. Test results are combined with the patient's available  medical, psychological, behavioral, and observational data for an integrated interpretation and report.    CNS Vital Signs Neurocognitive Battery  The CNS Vital Signs Neurocognitive Battery is a remotely-administered assessment comprised of seven core subtests to individually measure the patient's verbal memory, visual memory, motor speed, psychomotor speed, reaction time, focus, ability to sustain attention and ability to adapt to changing rules and tasks.      Above average domain scores indicate a standard score (SS) greater than 109 or a Percentile Rank (AZ) greater than 74, indicating a high functioning test subject. Average is a SS  or AZ 25-74, indicating normal function. Low Average is a SS 80-89 or AZ 9-24 indicating a slight deficit or impairment. Below Average is a SS 70-79 or AZ 2-8, indicating a moderate level of deficit or impairment. Very Low is a SS less than 70 or a AZ less than 2, indicating a deficit and impairment.  Validity Indicator denotes a guideline for representing the possibility of an invalid test or domain score, and can be influenced by patient understanding, effort, or other conditions.    The patient's results are detailed below:    Domain Standard Score Percentile Description Validity   Neurocognitive Index 80 9 Low Average Yes   Composite Memory Measure 76 5 Low No   Verbal Memory 69 2 Very Low No   Visual Memory 92 30 Average Yes   Psychomotor Speed 75 5 Low Yes   Reaction Time 92 30 Average Yes   Complex Attention 82 12 Low Average Yes   Cognitive Flexibility 77 6 Low Yes   Processing Speed 83 13 Low Average Yes   Executive Function 77 6 Low Yes   Reasoning 65 1 Very Low Yes   Working Memory 112 79 Above Average No   Sustained Attention  76 5 Low No   Simple Attention 82 12 Low Average Yes   Motor Speed 80 9 Low Average Yes     Neurocognitive Index (NCI): Measures an average score derived from the domain scores or a general assessment of the overall neurocognitive status of  the patient. The patient's NCI score is 80, with a percentile of 9, and falls within the low average range.    Composite Memory: Measures how well subject can recognize, remember, and retrieve words and geometric figures, and is comprised of the Visual and Verbal Memory domains. The patient's Composite Memory score is 76, with a percentile of 5, and falls within the low range.    Verbal Memory: Measures how well subject can recognize, remember, and retrieve words. The patient's Verbal Memory score is 69, with a percentile of 2, and falls within the very low range.    Visual Memory: Measures how well subject can recognize, remember and retrieve geometric figures. The patient's Visual Memory score is 92, with a percentile of 30, and falls within the average range.    Psychomotor Speed: Measures how well a subject perceives, attends, responds to complex visual-perceptual information and performs simple fine motor coordination, and is comprised of the Motor Speed and Processing Speed indexes. The patient's Psychomotor Speed score is 75, with a percentile of 5, and falls within the low range.    Reaction Time: Measures how quickly the subject can react, in milliseconds, to a simple and increasingly complex direction set. The patient's Reaction Time score is 92, with a percentile of 30, and falls within the average range.    Complex Attention: Measures the ability to track and respond to a variety of stimuli over lengthy periods of time and/or perform complex mental tasks requiring vigilance quickly and accurately. The patient's Complex Attention score is 82, with a percentile of 12, and falls within the low average range.    Cognitive Flexibility: Measures how well subject is able to adapt to rapidly changing and increasingly complex set of directions and/or to manipulate the information. The patient's Cognitive Flexibility score is 77, with a percentile of 6, and falls within the low range.    Processing Speed: Measures  how well a subject recognizes and processes information i.e., perceiving, attending/responding to incoming information, motor speed, fine motor coordination, and visual-perceptual ability. The patient's Processing Speed score is 83, with a percentile of 13, and falls within the low average range.    Executive Function: Measures how well a subject recognizes rules, categories, and manages or navigates rapid decision making. The patient's Executive Function score is 77, with a percentile of 6, and falls within the low range.    Reasoning: Measures how well a subject can perceive and understand the meaning of visual or abstract information and recognizing relationships between visual-abstract concepts. The patient's Reasoning score is 65, with a percentile of 1, and falls in the very low range.     Working Memory: Measures how well a subject can perceive and attend to symbols using short-term memory processes. Also measures the ability to carry out short-term memory tasks that support decision making, problem solving, planning, and execution. The patient's Working Memory score is 112, with a percentile of 79, and falls in the above average range.    Sustained Attention: Measures how well a subject can direct and focus cognitive activity on specific stimuli. Also measurs how well a subject can focus and complete task or activity, sequence action, and focus during complex thought. The patient's Sustained Attention score is 76, with a percentile of 5, and falls in the low range.    Simple Attention: Measures the ability to track and respond to a single defined stimulus over lengthy periods of time while performing vigilance and response inhibition quickly and accurately to a simple task. The patient's Simple Attention score is 82, with a percentile of 12, and falls within the low average range.    Motor Speed: Measure: Ability to perform simple movements to produce and satisfy an intention towards a manual action and  "goal. The patient's Motor Speed score is 80, with a percentile of 9, and falls within the low average range.    Kristina Adult ADHD Rating Scale-IV: Self and Other Reports (BAARS-IV)  The BAARS-IV assesses for symptoms of ADHD that are experienced in one's daily life. This assessment measure includes self and collateral rating scales designed to provide information regarding current and childhood symptoms of ADHD including inattention, hyperactivity, and impulsivity. Self-report scores are reported as percentiles. Scores at the 76th-83rd percentile are considered marginal, scores at the 84th-92nd percentile are considered borderline, scores at the 93rd-95th percentile are considered mild, scores at the 96th-98th percentile are considered moderate, and those at the 99th percentile are considered severe. Collateral or \"other\" rating scales are reported as number of symptoms observed in comparison to those reported by the client. Norms and percentile scores are not available for collateral reports.     Current Symptoms Scale--Self Report:   Client completed the self-report inventory of current symptoms. The results indicate that the client's Total ADHD Score was 54 which places the patient in the 99+ percentile for overall ADHD symptoms. In addition, the client endorsed 8/9 (99+ percentile) Inattention symptoms, 4/9 (97th percentile) Hyperactivity-Impulsivity symptoms, and 3/9 (90th percentile) Sluggish Cognitive Tempo symptoms. Client indicated that the reported symptoms have resulted in impaired functioning at home, work, and social relationships. Overall, the results suggest the client is experiencing moderate ADHD symptoms.     Current Symptoms Scale--Other Report:  Client's  completed the collateral report inventory of current symptoms. Based on the collateral contact's observation of symptoms, the client demonstrates 8/9 Inattention symptoms, 0/5 Hyperactivity symptoms, 4/4 Impulsivity symptoms, and 2/9 " Sluggish Cognitive Tempo symptoms. The client's Total ADHD Score was 48. The collateral contact indicated the client demonstrates impaired functioning at home, work, and social relationships.  The collateral- and self-report scores are not significantly different.    Childhood Symptoms Scale--Self-Report:  Client completed the self-report inventory of childhood symptoms. The results indicate that the client's Total ADHD Score was 54 which places the patient in the 98th percentile for overall ADHD symptoms in childhood. In addition, the client endorsed 9/9 (99+ percentile) Inattention symptoms and 4/9 (94th percentile) Hyperactivity-Impulsivity symptoms. Client indicated that the reported symptoms resulted in impaired functioning in school, home, and social relationships. Overall, the results suggest the client experienced moderate symptoms of ADHD as a child.     Childhood Symptoms Scale--Other Report:  Client's mother completed the collateral report inventory of childhood symptoms. Based on the collateral contact's recollection of client's childhood symptoms, the client demonstrated 7/9 Inattention symptoms and 3/9 Hyperactivity-Impulsivity symptoms. The client's Total ADHD Score was 48. The collateral contact indicated the client demonstrates impaired functioning in school, home, and social relationships. The collateral- and self-report scores are not significantly different.                           Kristina Functional Impairment Scale: Self and Other Reports (BFIS)  The BFIS is used to assess an individuals' psychosocial impairment in major life/daily activities that may be due to a mental health disorder. This assessment measure includes self and collateral rating scales. Self-report scores are reported as percentiles. Scores at the 76th-83rd percentile are considered marginal, scores at the 84th-92nd percentile are considered borderline, scores at the 93rd-95th percentile are considered mild, scores at the  "96th-98th percentile are considered moderate, and those at the 99th percentile are considered severe. Collateral or \"other\" rating scales are reported as number of symptoms observed in comparison to those reported by the client. Norms and percentile scores are not available for collateral reports.     Results indicate the client identified impairment (scores at or greater than 93rd percentile) in the following areas: home-family, home-chores, work, social-strangers, marriage/cohabiting/dating, money management, daily responsibilities, self-care routines, and health maintenance.  The client's Mean Impairment Score was 6.8 (99+ percentile) indicating the client is reporting 75% impairment in functioning across domains. Client's  completed the collateral rating scale, which indicated similar results. The collateral contact's scores were generally lower than the client's report.     Kristina Deficits in Executive Functioning Scale (BDEFS)  The BDEFS is a measure used for evaluating dimensions of adult executive functioning in daily life. This assessment measure includes self and collateral rating scales. Self-report scores are reported as percentiles. Scores at the 76th-83rd percentile are considered marginal, scores at the 84th-92nd percentile are considered borderline, scores at the 93rd-95th percentile are considered mild, scores at the 96th-98th percentile are considered moderate, and those at the 99th percentile are considered severe. Collateral or \"other\" rating scales are reported as number of symptoms observed in comparison to those reported by the client. Norms and percentile scores are not available for collateral reports.     Results indicate the client's Total Executive Functioning Score was 269 (99+ percentile). The ADHD-Executive Functioning Index score was 31 (98th percentile). These scores suggest the client has moderate deficits in executive functioning. Results indicate the client identified " significant deficits in the following areas: self-management to time (severe deficits), self-organization/problem-solving (moderate deficits), self-restraint (severe deficits), self-motivation (moderate deficits) and self-regulation of emotions (mild deficits). Client's  completed the collateral rating scale, which indicated similar/discrepant results.     Generalized Anxiety Disorder Questionnaire (AMANDA-7)  This questionnaire is designed to assess for anxiety in adults. Based on the score, the patient is experiencing moderate symptoms of anxiety. Client identified the following symptoms of anxiety: feeling on edge/nervous/anxious, difficulty controlling worry, worrying about many different things, trouble relaxing, becoming easily annoyed or irritable, and feeling something awful might happen.    Patient Health Questionnaire- 9 (PHQ-9)   This questionnaire is designed to assess for depression in adults. Based on the score, the patient is experiencing moderate symptoms of depression. Client identified the following symptoms of depression: depressed mood, lack of interest, difficulty with sleep, feeling tired or having little energy, poor appetite or overeating, feeling bad about self, and poor concentration.    SUMMARY: Geni Jin is a 43-year-old White woman who completed psychological testing remotely/virtually. Testing was requested to provide updated diagnostic clarification and necessary treatment recommendations.    Patient first completed a diagnostic interview in which mental health symptoms, ADHD symptoms, and background information was gathered. Patient self-reported six symptoms of inattention, three symptoms of hyperactivity, and indicated that her abilities to function effectively at home and work are significantly impaired. Further, her self-reported symptoms on Kristina measures of ADHD symptoms were consistent with this information.  Both the patient and her mother recalled a history of  symptoms during childhood.    An objective measure of neurocognitive functioning was also administered.  Results first indicated visual memory to be scored in the overall average range.  She was able to recognize target shapes immediately in the average range and in the low average range after a delay.  Verbal memory score to be invalid as result of patient not understanding directions for the delayed recognition phase.  Reasoning score to be in the very low range, as the patient was able to solve nonverbal puzzle matrices less effectively than peers.  Results further indicated that she worked through problems significantly faster than peers.  It is possible that she may have performed better if she took more time to fully think through the options for this task.  Processing speed score to be in the low average range, as she was able to scan and respond to visual stimuli slower than peers.  On the Stroop test, the patient was able to inhibit the salient, but incorrect, response in the average range.  On a test of shifting attention, the patient was able to adjust her responses according to changing rules sets in the low average range.  As such, complex attention was scored to be in the low average range while cognitive flexibility and executive functioning were scored to be in the low range.  On a test of continuous performance, the patient was able to hold her attention in the average range and resist making impulsive mistakes in the low range; simple attention scored to be in the low average range.  On CNS Vital Signs, ADHD is associated with significant deficits in attention, processing speed, and executive functioning capabilities.  It appears as though the patient may struggle with balancing speed and accuracy and demonstrated problems in target areas.  This information, combined with history of childhood symptoms, indicate that problems can be conceptualized as having a neurodevelopmental basis.  See  recommendations below.    Referral Question Response: DSM-5 criteria for ADHD:   A. Symptom Count - Are there sufficient symptoms for the diagnosis? Yes, patient did endorse sufficient inattention symptoms.   B. Onset - Were several symptoms present before 13 years of age? Yes, a significant number of symptoms reportedly began in elementary school.   C. Pervasiveness - Are several symptoms present in at least two settings? Yes, patient reported that symptoms are problematic at home and work.   D. Impairment - Do symptoms interfere with or reduce the quality of functioning? Yes, patient is unable to complete daily tasks effectively.   E. Exclusions - Are symptoms better explained by another disorder or factor?  No, symptoms are not better explained by anxiety symptoms. Difficulties are explained by an organic basis of inattention.     The patient meets the following DSM-5 criteria for generalized anxiety disorder:  A. Excessive anxiety and worry, occurring more days than not for at least 6 months about a number of events or activities.   B. The individual finds it difficult to control the worry.  C. The anxiety and worry are associated with 3 or more of 6 symptoms.  D. The anxiety, worry, or physical symptoms cause clinically significant distress or impairment in social, occupational, or other important areas of functioning.  E. The disturbance is not attributable to the physiological effects of a substance (e.g., a drug of abuse, a medication) or another medical condition (e.g., hyperthyroidism).  F. The disturbance is not better explained by another mental disorder.    DIAGNOSES:  F90.0 Attention-Deficit/Hyperactivity Disorder, inattentive presentation, moderate  F41.1 Generalized Anxiety Disorder    PLAN OF CARE:  Discuss the following with your primary care provider:  Consider a trial of a stimulant medication. This may help alleviate some of the patient's attentional symptoms.     Consider initiating individual  psychotherapy to help alleviate mood symptoms. Research indicates that outcomes are best with both medication and therapy. You can call the [facility] line at [phone].     RECOMMENDATIONS:  Due to the patient's reported attention, concentration, and mood difficulties, the following health/lifestyle changes when combined, can significantly improve symptoms:   Avoid simple carbohydrates at breakfast. Aim for only complex carbohydrates and lean protein for your morning meal.   Engage in aerobic exercise 3 times per week for 30 minutes, ensuring that your heart rate stays within your training zone. Further, reading the book,  Spark,  by Ahmet Banks M.D can help the patient understand the benefits of exercise on the brain.   Research suggest that taking a high-quality multi-vitamin and antioxidant (1/2 cup of blueberries) daily in conjunction with balanced nutrition can be helpful.  Aim for the high end of daily water intake: around 72 ounces per day.  Ensure regular meals and snacks to maintain optimal attention.    The following may be beneficial in managing some of the patient's attention and concentration difficulties:  Due to the patient's difficulties with attention and concentration, consider working in a completely distraction-free area while completing tasks. Workspaces should be completely clear except for the materials needed for the current task. Both visual and auditory distractions should be decreased as much as possible.  Considering decreased ability to focus and maintain attention, it is recommended that the patient take frequent breaks while completing tasks. This will help to maintain attention and effort. The patient may benefit from the use of a BoardVantage Timer. The timer works by using built-in break times. After working on a task consistently for 25 minutes, the timer reminds the user to take a five-minute break before continuing, etc. A BoardVantage timer can be downloaded  as a free ciera to a phone or tablet.  Due to the patient's attentional and concentration symptoms, it is recommended to increase organization with the use of lists and calendars. Significantly increasing structure to the day and adhering to a set schedule can increase your ability to complete responsibilities, track deadline, etc. Breaking these tasks down into their component parts and recording them in a calendar/planner will likely be beneficial. Patient would benefit from setting feasible timelines for completion of activities. By establishing clear priorities for completing tasks, you can more likely complete the most important tasks first. The patient may also choose to elect to a friend or family member to help hold them accountable.    Avoid multitasking. Attempting to work on multiple tasks and projects the same increases the likelihood that an error will occur. Focus on one task at a time.    The patient may benefit from engaging in mindfulness practices. This may include breathing techniques, apps that provide guided meditation, or more interactive activities such as coloring.    See the attached tip sheet for more ideas as well as online and book resources.       Cynthia Whyte PsyD,   Clinical Psychologist

## 2024-01-25 NOTE — PATIENT INSTRUCTIONS
Coping with Attention-Deficit/Hyperactivity Disorder (ADHD)    If you have ADHD, it can be hard to sit still, pay attention or control impulsive behavior. ADHD can cause problems at home, at school, at work and in social settings. But you can learn ways to control your symptoms. Below are some ideas for coping with the most common ADHD problems.     Memory, Focus, and Managing Time  Be mindful of time. Use a watch, phone, timer, alarm, PDA or computer--anything that keeps accurate time that you can see and hear at all times. Set more than one alarm to remind you how much time you have left for a task. Give yourself more time than you think you need. For important appointments or deadlines, set reminder alarms hours or days ahead of time.   Use a day planner. A day planner can help you manage time and remember responsibilities. Learning to use a planner is just like learning to use any tool--practice makes perfect.   Use lists. Lists and notes can help you keep track of regular tasks, projects, deadlines and appointments. If you decide to use a daily planner, keep all lists and notes inside of it. Use color codes for tasks so you know which ones are the most important.  Learn to say no and set boundaries. Do not take on too many projects or social engagements. Overbooking yourself can be overwhelming and can lead to missed commitments.  Repeat out loud the instructions you have been given. This will help you remember, as well as let others correct you if needed.    Organization  Create space. Ask yourself what you need on a daily basis. Then, find storage bins or closets for things you don t need every day. Have specific areas for things like keys, bills and other items that are easy to misplace. Throw away or donate things you don t need.   Deal with it now. You can avoid forgetfulness, clutter and procrastination by doing things right away, not some time in the future. This includes filing papers, cleaning up  messes, opening and responding to mail and returning phone calls.  Set up a filing system. Use dividers or separate file folders for different types of documents, such as medical records, receipts and pay stubs. Label and color-code your files so that you can quickly find what you need.   Break larger tasks into small, manageable pieces. Write down the steps needed to complete the task. Follow each step in order until the task is done. If needed, take small, timed breaks and return to finish the task.  Organize your work space. Use lists or planners to organize your day. Remove clutter from your desk. Label any storage bins. Reduce distraction as much as possible. Ideas include sitting facing the wall, closing your door or using a white noise machine.    Sitting Still  Move around as needed. Don t fight the urge to move around. If you need to fidget or stand up for a period of time to help maintain attention, then do so. But take care that you re not interrupting others. You may also find it helpful to squeeze a stress ball.  Be active in a useful way. Exercise can burn off extra energy, relieve stress, boost your mood and calm your mind. Meditation, yoga or frank chi can help you better control your attention and impulses.  Stay healthy. Get enough sleep. Avoid caffeine late in the day. Exercise. Create a relaxing bedtime routine. Stick to a schedule or daily routine. Eat small meals throughout the day. Avoid sugar, eat fewer carbohydrates and eat more protein.     Close Relationships  Talk to your loved ones about ADHD. There are many resources available that can help your loved one understand ADHD. See the Resources section below.  Divide daily tasks based on each person s strengths. For example, if you have trouble with organization, you may not want to do financial planning tasks.  If you have trouble with focus, develop a sign that others in your life can use as a gentle reminder to pay attention. The sign  should be small but meaningful to you and the other person.  Improve communication. Use a dry erase board in a common area to help the whole family stay in touch better. Keep regular to-do lists and compare scheduled activities every day. Writing notes to each other is also very helpful.  Be an active listener and try not to interrupt. If you find your mind wandering when others are talking, mentally repeat their words so you can follow the conversation. Ask questions and ask people to repeat what they said if needed. Pay close attention to body language.   Organize your thoughts. If you need to have a serious conversation, write a list of the points you would like to make or the important ideas you want to talk about. This can help you stay focused and remember what you need to say.  Think before speaking. It can be hard to control your impulses when you feel strongly about something. Before saying whatever pops into your head, stop to ask yourself  Will this be helpful?  or  Will this help me get what I want?   Take charge of your life. Work to accept that some things are harder for you. Make a plan to address these troubles and seek the support you need.    Online Resources  ADD Warehouse. http://www.Emcore.com  ADHD and You.  Tips for Adults with ADHD.  http://www.adhdandyou.com/adhd-patient/adhd-tips-young-adult/  Attention Deficit Disorder Association. http://www.add.org  Attention Deficit Disorder Resources. http://www.addresources.org  Children and Adults with Attention-Deficit/Hyperactivity Disorder (ALLA). http://www.alla.org/  Loreta Zhao.  33 Ways to Get Organized with Adult ADHD.  http://www.additudemag.com/adhd/article/729.html  National Resource Center on ADHD. http://www.ihls9ypif.org/  Terri Elizabeth.  Daily Living Tips for Adult ADHD.  http://www.medicinenet.com/living_tips_adult_adhd_pictures_slideshow/article.htm  Js Farley and Shilpi Bermeo.  Help for Adult ADD / ADHD.   http://www.helpguide.org/mental/adhd_add_adult_strategies.htm  You can also find many apps for your phone that can help you with common ADHD struggles    Book Resources  Caleb Acevedo. ADHD in Adults. (2008).  Caleb Acevedo. Taking Charge of Adult ADHD. (2010).  Deepak Banks. Delivered from Distraction. (2006).  Deepak Banks. Driven to Distraction. (2011).  Heather De Anda. ADD in the Workplace. (1997).  Heather De Anda. ADD-Friendly Ways to Organize Your Life. (2002).  Albertina Cagle. The ADHD Effect on Marriage: Understand and Rebuild Your Relationship in Six Steps. (2010).  Nini Ferrell and Ana Luke. You Mean I m Not Lazy, Stupid or Crazy? (2006).  Chauncey Renteria. Understand Your Brain, Get More Done: The ADHD Executive Functions Workbook. (2012).    Support Groups  ADHD Adult Support Group  [address].  7:00 to 8:30 p.m., last Thursday of each month (except December).  Contact/RSVP: [email or web address]    ADHD Adult Support Group  [address].  Thursday 10:00 a.m. to 12:00 p.m. or 7:00 to 9:30 p.m.  Contact/RSVP: Suhas Villanueva. [phone] or [email or web address]     ADHD Adult Support Group for Spouses/Partners of adults with ADHD  [address].  Tuesday 12:00 to 2:00 p.m.   Contact/RSVP: Suhas Villanueva. [phone] or [email or web address]

## 2024-02-01 ENCOUNTER — VIRTUAL VISIT (OUTPATIENT)
Dept: PEDIATRICS | Facility: CLINIC | Age: 44
End: 2024-02-01
Payer: COMMERCIAL

## 2024-02-01 DIAGNOSIS — F90.0 ATTENTION DEFICIT HYPERACTIVITY DISORDER (ADHD), PREDOMINANTLY INATTENTIVE TYPE: Primary | ICD-10-CM

## 2024-02-01 DIAGNOSIS — E11.9 TYPE 2 DIABETES MELLITUS WITHOUT COMPLICATION, WITHOUT LONG-TERM CURRENT USE OF INSULIN (H): ICD-10-CM

## 2024-02-01 DIAGNOSIS — E66.01 MORBID OBESITY (H): ICD-10-CM

## 2024-02-01 DIAGNOSIS — F33.0 MILD RECURRENT MAJOR DEPRESSION (H): ICD-10-CM

## 2024-02-01 PROCEDURE — 99214 OFFICE O/P EST MOD 30 MIN: CPT | Mod: 95 | Performed by: NURSE PRACTITIONER

## 2024-02-01 RX ORDER — DEXTROAMPHETAMINE SACCHARATE, AMPHETAMINE ASPARTATE MONOHYDRATE, DEXTROAMPHETAMINE SULFATE AND AMPHETAMINE SULFATE 3.75; 3.75; 3.75; 3.75 MG/1; MG/1; MG/1; MG/1
15 CAPSULE, EXTENDED RELEASE ORAL DAILY
Qty: 30 CAPSULE | Refills: 0 | Status: SHIPPED | OUTPATIENT
Start: 2024-02-01 | End: 2024-02-28

## 2024-02-01 RX ORDER — DEXTROAMPHETAMINE SACCHARATE, AMPHETAMINE ASPARTATE, DEXTROAMPHETAMINE SULFATE AND AMPHETAMINE SULFATE 1.25; 1.25; 1.25; 1.25 MG/1; MG/1; MG/1; MG/1
5-10 TABLET ORAL DAILY PRN
Qty: 30 TABLET | Refills: 0 | Status: SHIPPED | OUTPATIENT
Start: 2024-02-01 | End: 2024-02-24

## 2024-02-01 RX ORDER — TIRZEPATIDE 12.5 MG/.5ML
12.5 INJECTION, SOLUTION SUBCUTANEOUS
Qty: 2 ML | Refills: 2 | Status: SHIPPED | OUTPATIENT
Start: 2024-02-01 | End: 2024-04-30

## 2024-02-01 NOTE — PROGRESS NOTES
Geni is a 43 year old who is being evaluated via a billable video visit.      How would you like to obtain your AVS? MyChart  If the video visit is dropped, the invitation should be resent by: Text to cell phone: [phone]  Will anyone else be joining your video visit? No          Assessment & Plan     Attention deficit hyperactivity disorder (ADHD), predominantly inattentive type  Start Adderall XR 15 mg daily in am and can use Adderall IR 5-10 mg in the afternoon as needed. She will stop phentermine. Reviewed side effects to watch for. Check BP at work 2x/week and bring readings to follow-up. Return in 1 month for virtual follow-up.   - amphetamine-dextroamphetamine (ADDERALL XR) 15 MG 24 hr capsule; Take 1 capsule (15 mg) by mouth daily  - amphetamine-dextroamphetamine (ADDERALL) 5 MG tablet; Take 1-2 tablets (5-10 mg) by mouth daily as needed (intattention)    Mild recurrent major depression (H24)  Stable. Taking lexapro as prescribed. No longer taking wellbutrin. Found that she would have episodes of anger, fights with her . Doing better off it.     Type 2 diabetes mellitus without complication, without long-term current use of insulin (H)  Morbid obesity (H)  Didn't find 15 mg dose as effective and noticed nausea and cravings. Decrease back down to 12.5 mg, which she felt better on. As noted above, we are stopping phentermine today.   - tirzepatide (MOUNJARO) 12.5 MG/0.5ML pen; Inject 12.5 mg Subcutaneous every 7 days      15 minutes spent by me on the date of the encounter doing chart review, patient visit, and documentation         MEDICATIONS:        - Decrease mounjaro to 12.5 mg       - Discontinue phentermine       - Start taking adderall        - Continue other medications without change  FUTURE APPOINTMENTS:       - Follow-up visit in 1 month.     Subjective   Geni is a 43 year old, presenting for the following health issues:  Medication Request    Via the Health Maintenance questionnaire,  the patient has reported the following services have been completed -Eye Exam, this information has been sent to the abstraction team.  HPI     Presents to discuss starting medication for management of ADHD.     Formal ADHD assessment completed 1/25/24, confirmed diagnosis of ADHD, inattentive type and AMANDA. Interested in trying a medication to help control her symptoms.      ROS: 10 point ROS neg other than the symptoms noted above in the HPI.        Objective           Vitals:  No vitals were obtained today due to virtual visit.    Physical Exam   GENERAL: alert and no distress  EYES: Eyes grossly normal to inspection.  No discharge or erythema, or obvious scleral/conjunctival abnormalities.  RESP: No audible wheeze, cough, or visible cyanosis.    SKIN: Visible skin clear. No significant rash, abnormal pigmentation or lesions.  NEURO: Cranial nerves grossly intact.  Mentation and speech appropriate for age.  PSYCH: Appropriate affect, tone, and pace of words          Video-Visit Details    Type of service:  Video Visit     Originating Location (pt. Location): Home    Distant Location (provider location):  Off-site  Platform used for Video Visit: Solitario  Signed Electronically by: CHUY Monge CNP

## 2024-02-24 ENCOUNTER — MYC REFILL (OUTPATIENT)
Dept: PEDIATRICS | Facility: CLINIC | Age: 44
End: 2024-02-24
Payer: COMMERCIAL

## 2024-02-24 DIAGNOSIS — F90.0 ATTENTION DEFICIT HYPERACTIVITY DISORDER (ADHD), PREDOMINANTLY INATTENTIVE TYPE: ICD-10-CM

## 2024-02-24 DIAGNOSIS — E11.9 TYPE 2 DIABETES MELLITUS WITHOUT COMPLICATION, WITHOUT LONG-TERM CURRENT USE OF INSULIN (H): ICD-10-CM

## 2024-02-26 RX ORDER — DEXTROAMPHETAMINE SACCHARATE, AMPHETAMINE ASPARTATE, DEXTROAMPHETAMINE SULFATE AND AMPHETAMINE SULFATE 1.25; 1.25; 1.25; 1.25 MG/1; MG/1; MG/1; MG/1
5-10 TABLET ORAL DAILY PRN
Qty: 30 TABLET | Refills: 0 | Status: SHIPPED | OUTPATIENT
Start: 2024-02-26 | End: 2024-03-14

## 2024-02-26 RX ORDER — ESCITALOPRAM OXALATE 10 MG/1
TABLET ORAL
Qty: 90 TABLET | Refills: 0 | Status: SHIPPED | OUTPATIENT
Start: 2024-02-26 | End: 2024-04-15

## 2024-02-28 ENCOUNTER — MYC REFILL (OUTPATIENT)
Dept: PEDIATRICS | Facility: CLINIC | Age: 44
End: 2024-02-28
Payer: COMMERCIAL

## 2024-02-28 DIAGNOSIS — F90.0 ATTENTION DEFICIT HYPERACTIVITY DISORDER (ADHD), PREDOMINANTLY INATTENTIVE TYPE: ICD-10-CM

## 2024-02-29 RX ORDER — DEXTROAMPHETAMINE SACCHARATE, AMPHETAMINE ASPARTATE MONOHYDRATE, DEXTROAMPHETAMINE SULFATE AND AMPHETAMINE SULFATE 3.75; 3.75; 3.75; 3.75 MG/1; MG/1; MG/1; MG/1
15 CAPSULE, EXTENDED RELEASE ORAL DAILY
Qty: 30 CAPSULE | Refills: 0 | Status: SHIPPED | OUTPATIENT
Start: 2024-02-29 | End: 2024-03-14

## 2024-03-03 ENCOUNTER — HEALTH MAINTENANCE LETTER (OUTPATIENT)
Age: 44
End: 2024-03-03

## 2024-03-14 ENCOUNTER — VIRTUAL VISIT (OUTPATIENT)
Dept: PEDIATRICS | Facility: CLINIC | Age: 44
End: 2024-03-14
Payer: COMMERCIAL

## 2024-03-14 DIAGNOSIS — F90.0 ATTENTION DEFICIT HYPERACTIVITY DISORDER (ADHD), PREDOMINANTLY INATTENTIVE TYPE: Primary | ICD-10-CM

## 2024-03-14 PROCEDURE — 99214 OFFICE O/P EST MOD 30 MIN: CPT | Mod: 95 | Performed by: NURSE PRACTITIONER

## 2024-03-14 RX ORDER — DEXTROAMPHETAMINE SACCHARATE, AMPHETAMINE ASPARTATE MONOHYDRATE, DEXTROAMPHETAMINE SULFATE AND AMPHETAMINE SULFATE 5; 5; 5; 5 MG/1; MG/1; MG/1; MG/1
20 CAPSULE, EXTENDED RELEASE ORAL DAILY
Qty: 30 CAPSULE | Refills: 0 | Status: SHIPPED | OUTPATIENT
Start: 2024-03-14 | End: 2024-03-19

## 2024-03-14 RX ORDER — DEXTROAMPHETAMINE SACCHARATE, AMPHETAMINE ASPARTATE, DEXTROAMPHETAMINE SULFATE AND AMPHETAMINE SULFATE 2.5; 2.5; 2.5; 2.5 MG/1; MG/1; MG/1; MG/1
TABLET ORAL
Qty: 30 TABLET | Refills: 0 | Status: SHIPPED | OUTPATIENT
Start: 2024-03-14 | End: 2024-04-08

## 2024-03-14 NOTE — PROGRESS NOTES
Geni is a 43 year old who is being evaluated via a billable video visit.          Assessment & Plan     Attention deficit hyperactivity disorder (ADHD), predominantly inattentive type  Improved but not optimized. Increase to Adderall XR 20 mg in am with Adderall IR 10 mg in pm as needed. Follow-up virtually in 1 month.   - amphetamine-dextroamphetamine (ADDERALL XR) 20 MG 24 hr capsule; Take 1 capsule (20 mg) by mouth daily  - amphetamine-dextroamphetamine (ADDERALL) 10 MG tablet; Take one tablet (10 mg) as needed in the afternoons.      15 minutes spent by me on the date of the encounter doing chart review, patient visit, and documentation         MEDICATIONS:        - Increase Adderall XR to 20 mg       - Continue other medications without change  FUTURE APPOINTMENTS:       - Follow-up visit in 1 month.     Subjective   Geni is a 43 year old, presenting for the following health issues:  Recheck Medication    HPI     ADHD follow-up.     Started adderall one month ago.   Adderall XR 15 mg daily with Adderall IR 5-10 mg in the pm as needed.     She feels the medication was working well in the beginning, feels like it wears off really fast now. Takes the medication around 9 am, seems to wear off around 1 pm. During the hours of 9a-1p feels the medication could be more effective. Using booster dose in the afternoon, started with needing just 5 mg but now relies on 10 mg consistently.           Objective       Vitals:  No vitals were obtained today due to virtual visit.    Physical Exam   GENERAL: alert and no distress  EYES: Eyes grossly normal to inspection.  No discharge or erythema, or obvious scleral/conjunctival abnormalities.  RESP: No audible wheeze, cough, or visible cyanosis.    SKIN: Visible skin clear. No significant rash, abnormal pigmentation or lesions.  NEURO: Cranial nerves grossly intact.  Mentation and speech appropriate for age.  PSYCH: Appropriate affect, tone, and pace of words           Video-Visit Details    Type of service:  Video Visit   Originating Location (pt. Location): Home    Distant Location (provider location):  Off-site  Platform used for Video Visit: Solitario  Signed Electronically by: CHUY Monge CNP

## 2024-03-18 ENCOUNTER — MYC MEDICAL ADVICE (OUTPATIENT)
Dept: PEDIATRICS | Facility: CLINIC | Age: 44
End: 2024-03-18
Payer: COMMERCIAL

## 2024-03-18 DIAGNOSIS — F90.0 ATTENTION DEFICIT HYPERACTIVITY DISORDER (ADHD), PREDOMINANTLY INATTENTIVE TYPE: ICD-10-CM

## 2024-03-19 RX ORDER — DEXTROAMPHETAMINE SACCHARATE, AMPHETAMINE ASPARTATE MONOHYDRATE, DEXTROAMPHETAMINE SULFATE AND AMPHETAMINE SULFATE 5; 5; 5; 5 MG/1; MG/1; MG/1; MG/1
20 CAPSULE, EXTENDED RELEASE ORAL DAILY
Qty: 30 CAPSULE | Refills: 0 | Status: SHIPPED | OUTPATIENT
Start: 2024-03-19 | End: 2024-04-15

## 2024-04-08 ENCOUNTER — MYC REFILL (OUTPATIENT)
Dept: PEDIATRICS | Facility: CLINIC | Age: 44
End: 2024-04-08
Payer: COMMERCIAL

## 2024-04-08 DIAGNOSIS — F90.0 ATTENTION DEFICIT HYPERACTIVITY DISORDER (ADHD), PREDOMINANTLY INATTENTIVE TYPE: ICD-10-CM

## 2024-04-08 ASSESSMENT — ANXIETY QUESTIONNAIRES
4. TROUBLE RELAXING: NOT AT ALL
3. WORRYING TOO MUCH ABOUT DIFFERENT THINGS: SEVERAL DAYS
1. FEELING NERVOUS, ANXIOUS, OR ON EDGE: SEVERAL DAYS
6. BECOMING EASILY ANNOYED OR IRRITABLE: NEARLY EVERY DAY
IF YOU CHECKED OFF ANY PROBLEMS ON THIS QUESTIONNAIRE, HOW DIFFICULT HAVE THESE PROBLEMS MADE IT FOR YOU TO DO YOUR WORK, TAKE CARE OF THINGS AT HOME, OR GET ALONG WITH OTHER PEOPLE: VERY DIFFICULT
GAD7 TOTAL SCORE: 6
7. FEELING AFRAID AS IF SOMETHING AWFUL MIGHT HAPPEN: NOT AT ALL
2. NOT BEING ABLE TO STOP OR CONTROL WORRYING: SEVERAL DAYS
GAD7 TOTAL SCORE: 6
8. IF YOU CHECKED OFF ANY PROBLEMS, HOW DIFFICULT HAVE THESE MADE IT FOR YOU TO DO YOUR WORK, TAKE CARE OF THINGS AT HOME, OR GET ALONG WITH OTHER PEOPLE?: VERY DIFFICULT
7. FEELING AFRAID AS IF SOMETHING AWFUL MIGHT HAPPEN: NOT AT ALL
5. BEING SO RESTLESS THAT IT IS HARD TO SIT STILL: NOT AT ALL

## 2024-04-09 RX ORDER — DEXTROAMPHETAMINE SACCHARATE, AMPHETAMINE ASPARTATE, DEXTROAMPHETAMINE SULFATE AND AMPHETAMINE SULFATE 2.5; 2.5; 2.5; 2.5 MG/1; MG/1; MG/1; MG/1
TABLET ORAL
Qty: 30 TABLET | Refills: 0 | Status: SHIPPED | OUTPATIENT
Start: 2024-04-09 | End: 2024-05-07

## 2024-04-15 ENCOUNTER — OFFICE VISIT (OUTPATIENT)
Dept: PEDIATRICS | Facility: CLINIC | Age: 44
End: 2024-04-15
Payer: COMMERCIAL

## 2024-04-15 VITALS
WEIGHT: 250.7 LBS | BODY MASS INDEX: 38 KG/M2 | HEART RATE: 69 BPM | RESPIRATION RATE: 18 BRPM | OXYGEN SATURATION: 98 % | DIASTOLIC BLOOD PRESSURE: 84 MMHG | HEIGHT: 68 IN | SYSTOLIC BLOOD PRESSURE: 133 MMHG | TEMPERATURE: 97.1 F

## 2024-04-15 DIAGNOSIS — G43.009 MIGRAINE WITHOUT AURA AND WITHOUT STATUS MIGRAINOSUS, NOT INTRACTABLE: ICD-10-CM

## 2024-04-15 DIAGNOSIS — R80.9 MICROALBUMINURIA: ICD-10-CM

## 2024-04-15 DIAGNOSIS — F90.0 ATTENTION DEFICIT HYPERACTIVITY DISORDER (ADHD), PREDOMINANTLY INATTENTIVE TYPE: Primary | ICD-10-CM

## 2024-04-15 DIAGNOSIS — E11.9 TYPE 2 DIABETES MELLITUS WITHOUT COMPLICATION, WITHOUT LONG-TERM CURRENT USE OF INSULIN (H): ICD-10-CM

## 2024-04-15 DIAGNOSIS — Z12.31 VISIT FOR SCREENING MAMMOGRAM: ICD-10-CM

## 2024-04-15 DIAGNOSIS — F33.0 MILD RECURRENT MAJOR DEPRESSION (H): ICD-10-CM

## 2024-04-15 DIAGNOSIS — Z79.899 ENCOUNTER FOR LONG-TERM CURRENT USE OF MEDICATION: ICD-10-CM

## 2024-04-15 LAB
AMPHETAMINES UR QL: DETECTED
BARBITURATES UR QL SCN: NOT DETECTED
BENZODIAZ UR QL SCN: NOT DETECTED
BUPRENORPHINE UR QL: NOT DETECTED
CANNABINOIDS UR QL: DETECTED
COCAINE UR QL SCN: NOT DETECTED
D-METHAMPHET UR QL: NOT DETECTED
METHADONE UR QL SCN: NOT DETECTED
OPIATES UR QL SCN: NOT DETECTED
OXYCODONE UR QL SCN: NOT DETECTED
PCP UR QL SCN: NOT DETECTED
TRICYCLICS UR QL SCN: NOT DETECTED

## 2024-04-15 PROCEDURE — 82043 UR ALBUMIN QUANTITATIVE: CPT | Performed by: NURSE PRACTITIONER

## 2024-04-15 PROCEDURE — 82570 ASSAY OF URINE CREATININE: CPT | Performed by: NURSE PRACTITIONER

## 2024-04-15 PROCEDURE — 99214 OFFICE O/P EST MOD 30 MIN: CPT | Performed by: NURSE PRACTITIONER

## 2024-04-15 PROCEDURE — 80306 DRUG TEST PRSMV INSTRMNT: CPT | Performed by: NURSE PRACTITIONER

## 2024-04-15 RX ORDER — ESCITALOPRAM OXALATE 20 MG/1
20 TABLET ORAL DAILY
Qty: 90 TABLET | Refills: 3 | Status: SHIPPED | OUTPATIENT
Start: 2024-04-15 | End: 2024-09-30 | Stop reason: ALTCHOICE

## 2024-04-15 RX ORDER — ESCITALOPRAM OXALATE 10 MG/1
TABLET ORAL
Qty: 90 TABLET | Refills: 3 | Status: SHIPPED | OUTPATIENT
Start: 2024-04-15 | End: 2024-09-30 | Stop reason: ALTCHOICE

## 2024-04-15 RX ORDER — DEXTROAMPHETAMINE SACCHARATE, AMPHETAMINE ASPARTATE MONOHYDRATE, DEXTROAMPHETAMINE SULFATE AND AMPHETAMINE SULFATE 5; 5; 5; 5 MG/1; MG/1; MG/1; MG/1
20 CAPSULE, EXTENDED RELEASE ORAL DAILY
Qty: 30 CAPSULE | Refills: 0 | Status: SHIPPED | OUTPATIENT
Start: 2024-04-15 | End: 2024-05-07

## 2024-04-15 RX ORDER — TRAMADOL HYDROCHLORIDE 50 MG/1
50 TABLET ORAL EVERY 6 HOURS PRN
Qty: 20 TABLET | Refills: 0 | Status: SHIPPED | OUTPATIENT
Start: 2024-04-15 | End: 2024-10-01

## 2024-04-15 RX ORDER — DEXTROAMPHETAMINE SACCHARATE, AMPHETAMINE ASPARTATE MONOHYDRATE, DEXTROAMPHETAMINE SULFATE AND AMPHETAMINE SULFATE 5; 5; 5; 5 MG/1; MG/1; MG/1; MG/1
20 CAPSULE, EXTENDED RELEASE ORAL DAILY
Qty: 30 CAPSULE | Refills: 0 | Status: SHIPPED | OUTPATIENT
Start: 2024-05-16 | End: 2024-06-15

## 2024-04-15 RX ORDER — DEXTROAMPHETAMINE SACCHARATE, AMPHETAMINE ASPARTATE MONOHYDRATE, DEXTROAMPHETAMINE SULFATE AND AMPHETAMINE SULFATE 5; 5; 5; 5 MG/1; MG/1; MG/1; MG/1
20 CAPSULE, EXTENDED RELEASE ORAL DAILY
Qty: 30 CAPSULE | Refills: 0 | Status: SHIPPED | OUTPATIENT
Start: 2024-06-16 | End: 2024-07-16

## 2024-04-15 ASSESSMENT — PATIENT HEALTH QUESTIONNAIRE - PHQ9
SUM OF ALL RESPONSES TO PHQ QUESTIONS 1-9: 4
SUM OF ALL RESPONSES TO PHQ QUESTIONS 1-9: 4
10. IF YOU CHECKED OFF ANY PROBLEMS, HOW DIFFICULT HAVE THESE PROBLEMS MADE IT FOR YOU TO DO YOUR WORK, TAKE CARE OF THINGS AT HOME, OR GET ALONG WITH OTHER PEOPLE: SOMEWHAT DIFFICULT

## 2024-04-15 NOTE — LETTER
[facility]  04/15/24  Patient: Ambar Jin  YOB: 1980  Medical Record Number: [id number]                                                                                  Non-Opioid Controlled Substance Agreement    This is an agreement between you and your provider regarding safe and appropriate use of controlled substances prescribed by your care team. Controlled substances are?medicines that can cause physical and mental dependence (abuse).     There are strict laws about having and using these medicines. We here at [facility] are  committed to working with you in your efforts to get better. To support you in this work, we'll help you schedule regular office appointments for medicine refills. If we must cancel or change your appointment for any reason, we'll make sure you have enough medicine to last until your next appointment.     As a Provider, I will:   Listen carefully to your concerns while treating you with respect.   Recommend a treatment plan that I believe is in your best interest and may involve therapies other than medicine.    Talk with you often about the possible benefits and the risk of harm of any medicine that we prescribe for you.  Assess the safety of this medicine and check how well it works.    Provide a plan on how to taper (discontinue or go off) using this medicine if the decision is made to stop its use.      ::  As a Patient, I understand controlled substances:     Are prescribed by my care provider to help me function or work and manage my condition(s).?  Are strong medicines and can cause serious side effects.     Need to be taken exactly as prescribed.?Combining controlled substances with certain medicines or chemicals (such as illegal drugs, alcohol, sedatives, sleeping pills, and benzodiazepines) can be dangerous or even fatal.? If I stop taking my medicines suddenly, I may have severe withdrawal symptoms.     The risks, benefits, and  side effects of these medicine(s) were explained to me. I agree that:    I will take part in other treatments as advised by my care team. This may be psychiatry or counseling, physical therapy, behavioral therapy, group treatment or a referral to specialist.    I will keep all my appointments and understand this is part of the monitoring of controlled substances.?My care team may require an office visit for EVERY controlled substance refill. If I miss appointments or don t follow instructions, my care team may stop my medicine    I will take my medicines as prescribed. I will not change the dose or schedule unless my care team tells me to. There will be no refills if I run out early.      I may be asked to come to the clinic and complete a urine drug test or complete a pill count. If I don t give a urine sample or participate in a pill count, the care team may stop my medicine.    I will only receive controlled substance prescriptions from this clinic. If I am treated by another provider, I will tell them that I am taking controlled substances and that I have a treatment agreement with this provider. I will inform my [facility] care team within one business day if I am given a prescription for any controlled substance by another healthcare provider. My [facility] care team can contact other providers and pharmacists about my use of any medicines.    It is up to me to make sure that I don't run out of my medicines on weekends or holidays.?If my care team is willing to refill my prescription without a visit, I must request refills only during office hours. Refills may take up to 3 business days to process. I will use one pharmacy to fill all my controlled substance prescriptions. I will notify the clinic about any changes to my insurance or medicine availability.    I am responsible for my prescriptions. If the medicine/prescription is lost, stolen or destroyed, it will not be replaced.?I also agree not  to share controlled substance medicines with anyone.     I am aware I should not use any illegal or recreational drugs. I agree not to drink alcohol unless my care team says I can.     If I enroll in the [place] [facility] Cannabis program, I will tell my care team before my next refill.    I will tell my care team right away if I become pregnant, have a new medical problem treated outside of my regular clinic, or have a change in my medicines.     I understand that this medicine can affect my thinking, judgment and reaction time.? Alcohol and drugs affect the brain and body, which can affect the safety of my driving. Being under the influence of alcohol or drugs can affect my decision-making, behaviors, personal safety and the safety of others. Driving while impaired (DWI) can occur if a person is driving, operating or in physical control of a car, motorcycle, boat, snowmobile, ATV, motorbike, off-road vehicle or any other motor vehicle ([place] Statute 169[place].20). I understand the risk if I choose to drive or operate any vehicle or machinery.    I understand that if I do not follow any of the conditions above, my prescriptions or treatment may be stopped or changed.   I agree that my provider, clinic care team and pharmacy may work with any city, state or federal law enforcement agency that investigates the misuse, sale or other diversion of my controlled medicine. I will allow my provider to discuss my care with, or share a copy of, this agreement with any other treating provider, pharmacy or emergency room where I receive care.     I have read this agreement and have asked questions about anything I did not understand.    ________________________________________________________  [facility] Signature - [facility]     ___________________                   Date     ________________________________________________________  Provider Signature - CHUY Monge CNP       ___________________                   Date      ________________________________________________________  Witness Signature (required if provider not present while patient signing)          ___________________                   Date

## 2024-04-15 NOTE — PROGRESS NOTES
Assessment & Plan     Attention deficit hyperactivity disorder (ADHD), predominantly inattentive type  Chronic, stable. Doing well on current dose of adderall, will continue on this without changes. Updated urine drug screen and controlled substance agreement. Return for routine ADHD follow-up in 6 months, virtually.   - amphetamine-dextroamphetamine (ADDERALL XR) 20 MG 24 hr capsule; Take 1 capsule (20 mg) by mouth daily for 30 days  - amphetamine-dextroamphetamine (ADDERALL XR) 20 MG 24 hr capsule; Take 1 capsule (20 mg) by mouth daily for 30 days  - amphetamine-dextroamphetamine (ADDERALL XR) 20 MG 24 hr capsule; Take 1 capsule (20 mg) by mouth daily for 30 days  - [id number] - Urine Drugs of Abuse Panel 13 - (Screening) - ([facility] Only); Future  - [id number] - Urine Drugs of Abuse Panel 13 - (Screening) - ([facility] Only)    Mild recurrent major depression (H24)  Worsening recently. Taking lexapro as prescribed, 30 mg daily. Looking for a therapist and intends to start that. Will return to primary care as needed. FMLA paperwork completed today allowing her to miss work 1 hr/week to attend therapy appts over the next 6 months.   - escitalopram (LEXAPRO) 10 MG tablet; TAKE 1 TABLET BY MOUTH DAILY ALONG WITH 20MG TABLET.  - escitalopram (LEXAPRO) 20 MG tablet; Take 1 tablet (20 mg) by mouth daily    Migraine without aura and without status migrainosus, not intractable  Chronic, stable. Refilled tramadol and completed controlled substance agreement. Will refill #30 tablets every 6 months with visits every 6 months.  - traMADol (ULTRAM) 50 MG tablet; Take 1 tablet (50 mg) by mouth every 6 hours as needed (migraine)    Visit for screening mammogram  - MA Screen Bilateral w/Dwain; Future    Microalbuminuria  Last microalbumin elevated >30, recheck today.   - Albumin Random Urine Quantitative with Creat Ratio      MEDICATIONS:  Continue current medications without change  FUTURE APPOINTMENTS:       - Follow-up visit in 6 months, ADHD  "follow-up    Subjective   Geni is a 43 year old, presenting for the following health issues:  med check and FORM TO BE FILLED OUT        4/15/2024     2:35 PM   Additional Questions   Roomed by miss   Accompanied by self         4/15/2024     2:35 PM   Patient Reported Additional Medications   Patient reports taking the following new medications no       Presents for ADHD follow-up and FMLA paperwork.     Feels good on her current dose of Adderall. The adderall allows her to complete tasks, hasn't been making little mistakes at work because she no longer rushes through things.   Adderall XR 20 mg in the am, Adderall IR 10 mg in the afternoons. Using the booster dose M-TH while working.     Has found that her depression has been a bit worse recently for unknown reasons. Taking lexapro 30 mg daily as prescribed. She is currently looking for a therapist through [facility].     Two migraines/month. Previously well managed with tramadol, last refill 6 months ago. Doesn't typically require refills more frequently than every 6 months.         Objective    /84 (BP Location: Right arm, Patient Position: Sitting, Cuff Size: Adult Large)   Pulse 69   Temp 97.1  F (36.2  C) (Tympanic)   Resp 18   Ht 1.73 m (5' 8.11\")   Wt 113.7 kg (250 lb 11.2 oz)   LMP 03/28/2024 (Exact Date)   SpO2 98%   BMI 38.00 kg/m    Body mass index is 38 kg/m .  Physical Exam  Constitutional:       General: She is not in acute distress.     Appearance: Normal appearance. She is not ill-appearing or toxic-appearing.   Cardiovascular:      Rate and Rhythm: Normal rate and regular rhythm.      Heart sounds: Normal heart sounds. No murmur heard.     No friction rub. No gallop.   Pulmonary:      Effort: Pulmonary effort is normal. No respiratory distress.   Skin:     General: Skin is warm and dry.   Neurological:      General: No focal deficit present.      Mental Status: She is alert and oriented to person, place, and time. "   Psychiatric:         Mood and Affect: Mood normal.         Behavior: Behavior normal.              Signed Electronically by: CHUY Monge CNP

## 2024-04-15 NOTE — LETTER
Opioid / Opioid Plus Controlled Substance Agreement    This is an agreement between you and your provider about the safe and appropriate use of controlled substance/opioids prescribed by your care team. Controlled substances are medicines that can cause physical and mental dependence (abuse).    There are strict laws about having and using these medicines. We here at [facility] are committing to working with you in your efforts to get better. To support you in this work, we ll help you schedule regular office appointments for medicine refills. If we must cancel or change your appointment for any reason, we ll make sure you have enough medicine to last until your next appointment.     As a Provider, I will:  Listen carefully to your concerns and treat you with respect.   Recommend a treatment plan that I believe is in your best interest. This plan may involve therapies other than opioid pain medication.   Talk with you often about the possible benefits, and the risk of harm of any medicine that we prescribe for you.   Provide a plan on how to taper (discontinue or go off) using this medicine if the decision is made to stop its use.    As a Patient, I understand that opioid(s):   Are a controlled substance prescribed by my care team to help me function or work and manage my condition(s).   Are strong medicines and can cause serious side effects such as:  Drowsiness, which can seriously affect my driving ability  A lower breathing rate, enough to cause death  Harm to my thinking ability   Depression   Abuse of and addiction to this medicine  Need to be taken exactly as prescribed. Combining opioids with certain medicines or chemicals (such as illegal drugs, sedatives, sleeping pills, and benzodiazepines) can be dangerous or even fatal. If I stop opioids suddenly, I may have severe withdrawal symptoms.  Do not work for all types of pain nor for all patients. If they re not helpful, I may be asked to stop  them.      The risks, benefits and side effects of these medicine(s) were explained to me. I agree that:  I will take part in other treatments as advised by my care team. This may be psychiatry or counseling, physical therapy, behavioral therapy, group treatment or a referral to a specialist.     I will keep all my appointments. I understand that this is part of the monitoring of opioids. My care team may require an office visit for EVERY opioid/controlled substance refill. If I miss appointments or don t follow instructions, my care team may stop my medicine.    I will take my medicines as prescribed. I will not change the dose or schedule unless my care team tells me to. There will be no refills if I run out early.     I may be asked to come to the clinic and complete a urine drug test or complete a pill count at any time. If I don t give a urine sample or participate in a pill count, the care team may stop my medicine.    I will only receive prescriptions from this clinic for chronic pain. If I am treated by another provider for acute pain issues, I will tell them that I am taking opioid pain medication for chronic pain and that I have a treatment agreement with this provider. I will inform my [facility] care team within one business day if I am given a prescription for any pain medication by another healthcare provider. My [facility] care team can contact other providers and pharmacists about my use of any medicines.    It is up to me to make sure that I don t run out of my medicines on weekends or holidays. If my care team is willing to refill my opioid prescription without a visit, I must request refills only during office hours. Refills may take up to 3 business days to process. I will use one pharmacy to fill all my opioid and other controlled substance prescriptions. I will notify the clinic about any changes to my insurance or medication availability.    I am responsible for my prescriptions.  If the medicine/prescription is lost, stolen or destroyed, it will not be replaced. I also agree not to share controlled substance medicines with anyone.    I am aware I should not use any illegal or recreational drugs. I agree not to drink alcohol unless my care team says I can.       If I enroll in the [place] [facility] Cannabis program, I will tell my care team prior to my next refill.     I will tell my care team right away if I become pregnant, have a new medical problem treated outside of my regular clinic, or have a change in my medications.    I understand that this medicine can affect my thinking, judgment and reaction time. Alcohol and drugs affect the brain and body, which can affect the safety of my driving. Being under the influence of alcohol or drugs can affect my decision-making, behaviors, personal safety, and the safety of others. Driving while impaired (DWI) can occur if a person is driving, operating, or in physical control of a car, motorcycle, boat, snowmobile, ATV, motorbike, off-road vehicle, or any other motor vehicle ([place] Statute 169A.20). I understand the risk if I choose to drive or operate any vehicle or machinery.    I understand that if I do not follow any of the conditions above, my prescriptions or treatment may be stopped or changed.          Opioids  What You Need to Know    What are opioids?   Opioids are pain medicines that must be prescribed by a doctor. They are also known as narcotics.     Examples are:   morphine (MS Contin, Omayra)  oxycodone (Oxycontin)  oxycodone and acetaminophen (Percocet)  hydrocodone and acetaminophen (Vicodin, Norco)   fentanyl patch (Duragesic)   hydromorphone (Dilaudid)   methadone  codeine (Tylenol #3)     What do opioids do well?   Opioids are best for severe short-term pain such as after a surgery or injury. They may work well for cancer pain. They may help some people with long-lasting (chronic) pain.     What do opioids NOT do well?   Opioids  never get rid of pain entirely, and they don t work well for most patients with chronic pain. Opioids don t reduce swelling, one of the causes of pain.                                    Other ways to manage chronic pain and improve function include:     Treat the health problem that may be causing pain  Anti-inflammation medicines, which reduce swelling and tenderness, such as ibuprofen (Advil, Motrin) or naproxen (Aleve)  Acetaminophen (Tylenol)  Antidepressants and anti-seizure medicines, especially for nerve pain  Topical treatments such as patches or creams  Injections or nerve blocks  Chiropractic or osteopathic treatment  Acupuncture, massage, deep breathing, meditation, visual imagery, aromatherapy  Use heat or ice at the pain site  Physical therapy   Exercise  Stop smoking  Take part in therapy       Risks and side effects     Talk to your doctor before you start or decide to keep taking opioids. Possible side effects include:    Lowering your breathing rate enough to cause death  Overdose, including death, especially if taking higher than prescribed doses  Worse depression symptoms; less pleasure in things you usually enjoy  Feeling tired or sluggish  Slower thoughts or cloudy thinking  Being more sensitive to pain over time; pain is harder to control  Trouble sleeping or restless sleep  Changes in hormone levels (for example, less testosterone)  Changes in sex drive or ability to have sex  Constipation  Unsafe driving  Itching and sweating  Dizziness  Nausea, throwing up and dry mouth    What else should I know about opioids?    Opioids may lead to dependence, tolerance, or addiction.    Dependence means that if you stop or reduce the medicine too quickly, you will have withdrawal symptoms. These include loose poop (diarrhea), jitters, flu-like symptoms, nervousness and tremors. Dependence is not the same as addiction.                     Tolerance means needing higher doses over time to get the same  effect. This may increase the chance of serious side effects.    Addiction is when people improperly use a substance that harms their body, their mind or their relations with others. Use of opiates can cause a relapse of addiction if you have a history of drug or alcohol abuse.    People who have used opioids for a long time may have a lower quality of life, worse depression, higher levels of pain and more visits to doctors.    You can overdose on opioids. Take these steps to lower your risk of overdose:    Recognize the signs:  Signs of overdose include decrease or loss of consciousness (blackout), slowed breathing, trouble waking up and blue lips. If someone is worried about overdose, they should call 911.    Talk to your doctor about Narcan (naloxone).   If you are at risk for overdose, you may be given a prescription for Narcan. This medicine very quickly reverses the effects of opioids.   If you overdose, a friend or family member can give you Narcan while waiting for the ambulance. They need to know the signs of overdose and how to give Narcan.     Don't use alcohol or street drugs.   Taking them with opioids can cause death.    Do not take any of these medicines unless your doctor says it s OK. Taking these with opioids can cause death:  Benzodiazepines, such as lorazepam (Ativan), alprazolam (Xanax) or diazepam (Valium)  Muscle relaxers, such as cyclobenzaprine (Flexeril)  Sleeping pills like zolpidem (Ambien)   Other opioids      How to keep you and other people safe while taking opioids:    Never share your opioids with others.  Opioid medicines are regulated by the Drug Enforcement Agency (HOWARD). Selling or sharing medications is a criminal act.    2. Be sure to store opioids in a secure place, locked up if possible. Young children can easily swallow them and overdose.    3. When you are traveling with your medicines, keep them in the original bottles. If you use a pill box, be sure you also carry a copy  of your medicine list from your clinic or pharmacy.    4. Safe disposal of opioids    Most pharmacies have places to get rid of medicine, called disposal kiosks. Medicine disposal options are also available in every [facility]. Search your county and  medication disposal  to find more options. You can find more details at:  https://[email or web address].[facility].[email or web address]./living-green/managing-unwanted-medications     I agree that my provider, clinic care team, and pharmacy may work with any city, state or federal law enforcement agency that investigates the misuse, sale, or other diversion of my controlled medicine. I will allow my provider to discuss my care with, or share a copy of, this agreement with any other treating provider, pharmacy or emergency room where I receive care.    I have read this agreement and have asked questions about anything I did not understand.    _______________________________________________________  Patient Signature - Ambar Weinbergwalker _____________________                   Date     _______________________________________________________  Provider Signature - CHUY Monge CNP   _____________________                   Date     _______________________________________________________  Witness Signature (required if provider not present while patient signing)   _____________________                   Date

## 2024-04-16 LAB
CREAT UR-MCNC: 75.9 MG/DL
MICROALBUMIN UR-MCNC: <12 MG/L
MICROALBUMIN/CREAT UR: NORMAL MG/G{CREAT}

## 2024-04-30 ENCOUNTER — MYC REFILL (OUTPATIENT)
Dept: PEDIATRICS | Facility: CLINIC | Age: 44
End: 2024-04-30
Payer: COMMERCIAL

## 2024-04-30 ENCOUNTER — TELEPHONE (OUTPATIENT)
Dept: PEDIATRICS | Facility: CLINIC | Age: 44
End: 2024-04-30
Payer: COMMERCIAL

## 2024-04-30 DIAGNOSIS — E66.01 MORBID OBESITY (H): ICD-10-CM

## 2024-04-30 DIAGNOSIS — E11.9 TYPE 2 DIABETES MELLITUS WITHOUT COMPLICATION, WITHOUT LONG-TERM CURRENT USE OF INSULIN (H): ICD-10-CM

## 2024-04-30 RX ORDER — TIRZEPATIDE 12.5 MG/.5ML
12.5 INJECTION, SOLUTION SUBCUTANEOUS
Qty: 2 ML | Refills: 2 | Status: SHIPPED | OUTPATIENT
Start: 2024-04-30 | End: 2024-05-03

## 2024-04-30 NOTE — TELEPHONE ENCOUNTER
Retail Pharmacy Prior Authorization Team   Phone: [phone]    PA Initiation    Medication: MOUNJARO 12.5 MG/0.5ML SC SOPN  Insurance Company: [employer]-Specialty PA's - Phone [phone] Fax [phone]  Pharmacy Filling the Rx: [facility] - [address]  Filling Pharmacy Phone: [phone]  Filling Pharmacy Fax:    Start Date: 4/30/2024    RECEIVED QUESTION SET FROM INSURANCE, FILLED OUT AND FAXED BACK -

## 2024-05-01 ENCOUNTER — MYC MEDICAL ADVICE (OUTPATIENT)
Dept: FAMILY MEDICINE | Facility: CLINIC | Age: 44
End: 2024-05-01
Payer: COMMERCIAL

## 2024-05-01 DIAGNOSIS — E11.9 TYPE 2 DIABETES MELLITUS WITHOUT COMPLICATION, WITHOUT LONG-TERM CURRENT USE OF INSULIN (H): ICD-10-CM

## 2024-05-01 DIAGNOSIS — E66.01 MORBID OBESITY (H): ICD-10-CM

## 2024-05-01 NOTE — TELEPHONE ENCOUNTER
Prior Authorization Approval    Medication: MOUNJARO 12.5 MG/0.5ML SC SOPN  Authorization Effective Date: 3/31/2024  Authorization Expiration Date: 4/30/2025  Insurance Company: [employer]-Specialty PA's - Phone [phone] Fax [phone]  Which Pharmacy is filling the prescription: [facility] - [address]  Pharmacy Notified: YES  Patient Notified: YES (faxed approval letter to pharmacy and notified patient via [employer] message)

## 2024-05-03 ENCOUNTER — ANCILLARY PROCEDURE (OUTPATIENT)
Dept: MAMMOGRAPHY | Facility: CLINIC | Age: 44
End: 2024-05-03
Attending: NURSE PRACTITIONER
Payer: COMMERCIAL

## 2024-05-03 DIAGNOSIS — Z12.31 VISIT FOR SCREENING MAMMOGRAM: ICD-10-CM

## 2024-05-03 PROCEDURE — 77063 BREAST TOMOSYNTHESIS BI: CPT | Mod: TC | Performed by: RADIOLOGY

## 2024-05-03 PROCEDURE — 77067 SCR MAMMO BI INCL CAD: CPT | Mod: TC | Performed by: RADIOLOGY

## 2024-05-03 RX ORDER — TIRZEPATIDE 12.5 MG/.5ML
12.5 INJECTION, SOLUTION SUBCUTANEOUS
Qty: 2 ML | Refills: 2 | Status: SHIPPED | OUTPATIENT
Start: 2024-05-03 | End: 2024-08-25

## 2024-05-07 ENCOUNTER — MYC REFILL (OUTPATIENT)
Dept: PEDIATRICS | Facility: CLINIC | Age: 44
End: 2024-05-07
Payer: COMMERCIAL

## 2024-05-07 DIAGNOSIS — F90.0 ATTENTION DEFICIT HYPERACTIVITY DISORDER (ADHD), PREDOMINANTLY INATTENTIVE TYPE: ICD-10-CM

## 2024-05-07 RX ORDER — DEXTROAMPHETAMINE SACCHARATE, AMPHETAMINE ASPARTATE, DEXTROAMPHETAMINE SULFATE AND AMPHETAMINE SULFATE 2.5; 2.5; 2.5; 2.5 MG/1; MG/1; MG/1; MG/1
TABLET ORAL
Qty: 30 TABLET | Refills: 0 | Status: SHIPPED | OUTPATIENT
Start: 2024-05-07 | End: 2024-06-06

## 2024-05-07 RX ORDER — DEXTROAMPHETAMINE SACCHARATE, AMPHETAMINE ASPARTATE MONOHYDRATE, DEXTROAMPHETAMINE SULFATE AND AMPHETAMINE SULFATE 5; 5; 5; 5 MG/1; MG/1; MG/1; MG/1
20 CAPSULE, EXTENDED RELEASE ORAL DAILY
Qty: 30 CAPSULE | Refills: 0 | Status: SHIPPED | OUTPATIENT
Start: 2024-05-07 | End: 2024-07-07

## 2024-05-21 ENCOUNTER — OFFICE VISIT (OUTPATIENT)
Dept: FAMILY MEDICINE | Facility: CLINIC | Age: 44
End: 2024-05-21
Payer: COMMERCIAL

## 2024-05-21 VITALS
TEMPERATURE: 98 F | HEIGHT: 68 IN | OXYGEN SATURATION: 100 % | RESPIRATION RATE: 14 BRPM | HEART RATE: 90 BPM | BODY MASS INDEX: 36.53 KG/M2 | WEIGHT: 241 LBS | SYSTOLIC BLOOD PRESSURE: 120 MMHG | DIASTOLIC BLOOD PRESSURE: 83 MMHG

## 2024-05-21 DIAGNOSIS — R10.9 FLANK PAIN: Primary | ICD-10-CM

## 2024-05-21 DIAGNOSIS — R11.0 NAUSEA: ICD-10-CM

## 2024-05-21 LAB
ALBUMIN SERPL BCG-MCNC: 4.4 G/DL (ref 3.5–5.2)
ALBUMIN UR-MCNC: NEGATIVE MG/DL
ALP SERPL-CCNC: 84 U/L (ref 40–150)
ALT SERPL W P-5'-P-CCNC: 111 U/L (ref 0–50)
ANION GAP SERPL CALCULATED.3IONS-SCNC: 11 MMOL/L (ref 7–15)
APPEARANCE UR: CLEAR
AST SERPL W P-5'-P-CCNC: 37 U/L (ref 0–45)
BILIRUB DIRECT SERPL-MCNC: <0.2 MG/DL (ref 0–0.3)
BILIRUB SERPL-MCNC: 0.3 MG/DL
BILIRUB UR QL STRIP: ABNORMAL
BUN SERPL-MCNC: 11.1 MG/DL (ref 6–20)
CALCIUM SERPL-MCNC: 9.3 MG/DL (ref 8.6–10)
CHLORIDE SERPL-SCNC: 105 MMOL/L (ref 98–107)
COLOR UR AUTO: YELLOW
CREAT SERPL-MCNC: 0.61 MG/DL (ref 0.51–0.95)
DEPRECATED HCO3 PLAS-SCNC: 24 MMOL/L (ref 22–29)
EGFRCR SERPLBLD CKD-EPI 2021: >90 ML/MIN/1.73M2
GLUCOSE SERPL-MCNC: 109 MG/DL (ref 70–99)
GLUCOSE UR STRIP-MCNC: NEGATIVE MG/DL
HGB UR QL STRIP: NEGATIVE
KETONES UR STRIP-MCNC: NEGATIVE MG/DL
LEUKOCYTE ESTERASE UR QL STRIP: NEGATIVE
NITRATE UR QL: NEGATIVE
PH UR STRIP: 6 [PH] (ref 5–8)
POTASSIUM SERPL-SCNC: 4.5 MMOL/L (ref 3.4–5.3)
PROT SERPL-MCNC: 7.3 G/DL (ref 6.4–8.3)
SODIUM SERPL-SCNC: 140 MMOL/L (ref 135–145)
SP GR UR STRIP: 1.02 (ref 1–1.03)
UROBILINOGEN UR STRIP-ACNC: 0.2 E.U./DL

## 2024-05-21 PROCEDURE — 36415 COLL VENOUS BLD VENIPUNCTURE: CPT

## 2024-05-21 PROCEDURE — 80053 COMPREHEN METABOLIC PANEL: CPT

## 2024-05-21 PROCEDURE — 82248 BILIRUBIN DIRECT: CPT

## 2024-05-21 PROCEDURE — 81003 URINALYSIS AUTO W/O SCOPE: CPT

## 2024-05-21 PROCEDURE — 99214 OFFICE O/P EST MOD 30 MIN: CPT

## 2024-05-21 RX ORDER — ONDANSETRON 4 MG/1
8 TABLET, FILM COATED ORAL EVERY 8 HOURS PRN
Qty: 15 TABLET | Refills: 0 | Status: SHIPPED | OUTPATIENT
Start: 2024-05-21 | End: 2024-07-26

## 2024-05-21 ASSESSMENT — ASTHMA QUESTIONNAIRES
QUESTION_5 LAST FOUR WEEKS HOW WOULD YOU RATE YOUR ASTHMA CONTROL: COMPLETELY CONTROLLED
QUESTION_2 LAST FOUR WEEKS HOW OFTEN HAVE YOU HAD SHORTNESS OF BREATH: NOT AT ALL
QUESTION_4 LAST FOUR WEEKS HOW OFTEN HAVE YOU USED YOUR RESCUE INHALER OR NEBULIZER MEDICATION (SUCH AS ALBUTEROL): NOT AT ALL
QUESTION_3 LAST FOUR WEEKS HOW OFTEN DID YOUR ASTHMA SYMPTOMS (WHEEZING, COUGHING, SHORTNESS OF BREATH, CHEST TIGHTNESS OR PAIN) WAKE YOU UP AT NIGHT OR EARLIER THAN USUAL IN THE MORNING: NOT AT ALL
ACT_TOTALSCORE: 25
ACT_TOTALSCORE: 25
QUESTION_1 LAST FOUR WEEKS HOW MUCH OF THE TIME DID YOUR ASTHMA KEEP YOU FROM GETTING AS MUCH DONE AT WORK, SCHOOL OR AT HOME: NONE OF THE TIME

## 2024-05-21 ASSESSMENT — PAIN SCALES - GENERAL: PAINLEVEL: SEVERE PAIN (6)

## 2024-05-21 NOTE — PATIENT INSTRUCTIONS
We will do some lab work today to see if we can determine the cause of your symptoms.    In the meantime, I am sending you home with a medication called Zofran.  This medication is used to manage nausea.  You can take this up to 3 times a day.  I would encourage you to take it about 30 minutes before mealtime.  This will ensure that you are able to eat and drink on your regular schedule.    I will follow-up with you once I see the results of your lab work so that we can discuss what to do as far as neck steps to manage her concerns    Please seek immediate medical attention with symptoms including excruciating abdominal or pelvic pain, high-grade fever, inability to eat or drink, persistent vomiting or diarrhea, blood in your urine, or severe flank pain

## 2024-05-21 NOTE — PROGRESS NOTES
Assessment & Plan     (R10.9) Flank pain  (primary encounter diagnosis)  Comment: Acute and stable.  Medical signs stable.  Some possibility for pyelonephritis, but patient has not had any vomiting, fever, and is stable in the clinic today.  Nontoxic-appearing.  Differential diagnoses include UTI versus pyelonephritis versus kidney stone versus renal or hepatic abnormalities.  UA was clear aside from small amount of bilirubin appreciated, so this helps to rule out concerns for urinary tract infection.  Will wait for the remainder of the blood work to result to make a final determination on next steps.  Educated patient that there is some possibility she will need a referral to specialty such as urology or nephrology.  If we are unable to determine the cause of urinary abnormalities, may need a renal ultrasound or escalating to cystoscopy.  Plan: Basic metabolic panel  (Ca, Cl, CO2, Creat,         Gluc, K, Na, BUN), UA Macroscopic with reflex         to Microscopic and Culture - Lab Collect,         Hepatic panel (Albumin, ALT, AST, Bili, Alk         Phos, TP)    (R11.0) Nausea  Comment: Acute and stable.  No concerns for persistent emesis.  Vital signs stable, and not concerned about significant dehydration.  Will send with Zofran to ensure that she is able to stay well-hydrated. Offered education on medications including appropriate dosing, possible side effects, and possible adverse effects.  Education given on return to clinic instructions as well as alarm signs that would require the need for immediate medical attention.  Patient attested to understanding.  Plan: ondansetron (ZOFRAN) 4 MG tablet      Ordering of each unique test  Prescription drug management  I spent a total of 17 minutes on the day of the visit.   Time spent by me doing chart review, history and exam, documentation and further activities per the note      FUTURE APPOINTMENTS:       - Follow-up visit in 1 week if symptoms worsen or do not  improve       - Follow-up for annual visit or as needed  Patient Instructions   We will do some lab work today to see if we can determine the cause of your symptoms.    In the meantime, I am sending you home with a medication called Zofran.  This medication is used to manage nausea.  You can take this up to 3 times a day.  I would encourage you to take it about 30 minutes before mealtime.  This will ensure that you are able to eat and drink on your regular schedule.    I will follow-up with you once I see the results of your lab work so that we can discuss what to do as far as neck steps to manage her concerns    Please seek immediate medical attention with symptoms including excruciating abdominal or pelvic pain, high-grade fever, inability to eat or drink, persistent vomiting or diarrhea, blood in your urine, or severe flank pain    Subjective   Geni is a 43 year old, presenting for the following health issues:  UTI (Back pain and urine has been brown the past few days. She did have some elevated kidney function tests.)        5/21/2024     7:46 AM   Additional Questions   Roomed by Diane SOSA     History of Present Illness       Reason for visit:  Dark urine, back pain, nausea and fatigue  Symptom onset:  1-2 weeks ago  Symptom intensity:  Moderate  Symptom progression:  Worsening  Had these symptoms before:  No    She eats 2-3 servings of fruits and vegetables daily.She consumes 0 sweetened beverage(s) daily.She exercises with enough effort to increase her heart rate 9 or less minutes per day.  She exercises with enough effort to increase her heart rate 4 days per week.   She is taking medications regularly.    Geni is a 43-year-old female with a past medical history significant for migraines without aura, anxiety, PCOS, depression, type 2 diabetes, asthma, obesity, hepatic steatosis, alcohol abuse, abdominal pain, abnormal liver function, hirsutism, iron deficiency anemia, and mitral albuminuria who presents  "today with concerns for changes in her urinary habits.  Patient reports that approximately 1 week ago she began to experience very dark-colored urine that she describes almost as ice tea colored.  This is quite intermittent for the first couple of days, but after that time her urine was consistently dark in color, and she began to experience bilateral flank pain.  This was shortly after accompanied by some significant nausea without vomiting, fatigue, frequency and urgency to urination, and lower abdominal pain.  She declines any fever, chills, body aches, changes in her bowel habits, abnormal vaginal discharge, vaginal pain, or dysuria.  She also declines any other illness symptoms including chest congestion, chest pain, shortness of breath, cough, sore throat, ear pain, or blurry or double vision.  Her last menstrual period began on April 28.  She does not have concerns for pregnancy.  She is still eating and drinking well.      Review of Systems  Constitutional, HEENT, cardiovascular, pulmonary, gi and gu systems are negative, except as otherwise noted.      Objective    /83 (BP Location: Left arm, Patient Position: Sitting, Cuff Size: Adult Regular)   Pulse 90   Temp 98  F (36.7  C) (Tympanic)   Resp 14   Ht 1.73 m (5' 8.11\")   Wt 109.3 kg (241 lb)   LMP 04/29/2024 (Approximate)   SpO2 100%   BMI 36.53 kg/m    Body mass index is 36.53 kg/m .  Physical Exam   GENERAL: alert and no distress  EYES: Eyes grossly normal to inspection, PERRL and conjunctivae and sclerae normal  HENT: ear canals and TM's normal, nose and mouth without ulcers or lesions  NECK: no adenopathy, no asymmetry, masses, or scars  RESP: lungs clear to auscultation - no rales, rhonchi or wheezes  CV: regular rate and rhythm, normal S1 S2, no S3 or S4, no murmur, click or rub, no peripheral edema  ABDOMEN: soft, nontender, no hepatosplenomegaly, no masses and bowel sounds normal  MS: no gross musculoskeletal defects noted, no " edema  SKIN: no suspicious lesions or rashes  BACK: no CVA tenderness, no paralumbar tenderness  PSYCH: mentation appears normal, affect normal/bright    Results for orders placed or performed in visit on 05/21/24 (from the past 24 hour(s))   UA Macroscopic with reflex to Microscopic and Culture - Lab Collect    Specimen: Urine, Clean Catch   Result Value Ref Range    Color Urine Yellow Colorless, Straw, Light Yellow, Yellow    Appearance Urine Clear Clear    Glucose Urine Negative Negative mg/dL    Bilirubin Urine Small (A) Negative    Ketones Urine Negative Negative mg/dL    Specific Gravity Urine 1.025 1.005 - 1.030    Blood Urine Negative Negative    pH Urine 6.0 5.0 - 8.0    Protein Albumin Urine Negative Negative mg/dL    Urobilinogen Urine 0.2 0.2, 1.0 E.U./dL    Nitrite Urine Negative Negative    Leukocyte Esterase Urine Negative Negative    Narrative    Microscopic not indicated       Milagros Shoemaker DNP FNP-C  Family Nurse Practitioner - Same Day Provider  [facility] [place]    Signed Electronically by: CHUY Street CNP

## 2024-05-21 NOTE — LETTER
May 21, 2024      Ambar Jin  [address] [place] [address]        To Whom It May Concern:    Ambar Jin  was seen on 5/21/2024.  Please excuse her from missed time at work on 5/20/2024 and 5/21/2024 due to illness.        Sincerely,        CHUY Street CNP

## 2024-05-29 DIAGNOSIS — E11.9 TYPE 2 DIABETES MELLITUS WITHOUT COMPLICATION, WITHOUT LONG-TERM CURRENT USE OF INSULIN (H): ICD-10-CM

## 2024-05-29 DIAGNOSIS — E66.01 MORBID OBESITY (H): ICD-10-CM

## 2024-05-29 RX ORDER — TIRZEPATIDE 12.5 MG/.5ML
INJECTION, SOLUTION SUBCUTANEOUS
Qty: 2 ML | Refills: 12 | OUTPATIENT
Start: 2024-05-29

## 2024-05-29 NOTE — TELEPHONE ENCOUNTER
Script sent to the pharmacy on 5/2/24 for 2 mL with 2 additional refills on file (3 month supply). Script was sent to [id number] - [place] - [facility] [address]    Now receiving a refill request from [facility].     Triage: can we please contact the patient and clarify what pharmacy she would like to use for this medication.    Thank you,  Latasha Rosa RN

## 2024-05-29 NOTE — TELEPHONE ENCOUNTER
Outgoing call to patient. She did not know the refills were on file at [facility]. She will call them.    Purvi Perez RN

## 2024-06-06 ENCOUNTER — MYC REFILL (OUTPATIENT)
Dept: PEDIATRICS | Facility: CLINIC | Age: 44
End: 2024-06-06
Payer: COMMERCIAL

## 2024-06-06 DIAGNOSIS — F90.0 ATTENTION DEFICIT HYPERACTIVITY DISORDER (ADHD), PREDOMINANTLY INATTENTIVE TYPE: ICD-10-CM

## 2024-06-06 RX ORDER — DEXTROAMPHETAMINE SACCHARATE, AMPHETAMINE ASPARTATE, DEXTROAMPHETAMINE SULFATE AND AMPHETAMINE SULFATE 2.5; 2.5; 2.5; 2.5 MG/1; MG/1; MG/1; MG/1
TABLET ORAL
Qty: 30 TABLET | Refills: 0 | OUTPATIENT
Start: 2024-06-06

## 2024-06-06 RX ORDER — DEXTROAMPHETAMINE SACCHARATE, AMPHETAMINE ASPARTATE, DEXTROAMPHETAMINE SULFATE AND AMPHETAMINE SULFATE 2.5; 2.5; 2.5; 2.5 MG/1; MG/1; MG/1; MG/1
TABLET ORAL
Qty: 30 TABLET | Refills: 0 | Status: SHIPPED | OUTPATIENT
Start: 2024-06-06 | End: 2024-07-07

## 2024-06-06 RX ORDER — DEXTROAMPHETAMINE SACCHARATE, AMPHETAMINE ASPARTATE MONOHYDRATE, DEXTROAMPHETAMINE SULFATE AND AMPHETAMINE SULFATE 5; 5; 5; 5 MG/1; MG/1; MG/1; MG/1
20 CAPSULE, EXTENDED RELEASE ORAL DAILY
Qty: 30 CAPSULE | Refills: 0 | OUTPATIENT
Start: 2024-06-06

## 2024-06-14 ENCOUNTER — OFFICE VISIT (OUTPATIENT)
Dept: PEDIATRICS | Facility: CLINIC | Age: 44
End: 2024-06-14
Payer: COMMERCIAL

## 2024-06-14 VITALS
BODY MASS INDEX: 36.59 KG/M2 | WEIGHT: 241.4 LBS | HEIGHT: 68 IN | DIASTOLIC BLOOD PRESSURE: 88 MMHG | HEART RATE: 85 BPM | SYSTOLIC BLOOD PRESSURE: 125 MMHG | OXYGEN SATURATION: 99 % | TEMPERATURE: 97.4 F | RESPIRATION RATE: 16 BRPM

## 2024-06-14 DIAGNOSIS — E66.01 MORBID OBESITY (H): ICD-10-CM

## 2024-06-14 DIAGNOSIS — M54.50 ACUTE RIGHT-SIDED LOW BACK PAIN WITHOUT SCIATICA: Primary | ICD-10-CM

## 2024-06-14 DIAGNOSIS — E11.9 TYPE 2 DIABETES MELLITUS WITHOUT COMPLICATION, WITHOUT LONG-TERM CURRENT USE OF INSULIN (H): ICD-10-CM

## 2024-06-14 LAB
ALBUMIN UR-MCNC: NEGATIVE MG/DL
APPEARANCE UR: CLEAR
BILIRUB UR QL STRIP: NEGATIVE
COLOR UR AUTO: YELLOW
GLUCOSE UR STRIP-MCNC: NEGATIVE MG/DL
HBA1C MFR BLD: 5.3 % (ref 0–5.6)
HGB UR QL STRIP: NEGATIVE
KETONES UR STRIP-MCNC: NEGATIVE MG/DL
LEUKOCYTE ESTERASE UR QL STRIP: NEGATIVE
NITRATE UR QL: NEGATIVE
PH UR STRIP: 5.5 [PH] (ref 5–7)
SP GR UR STRIP: 1.02 (ref 1–1.03)
UROBILINOGEN UR STRIP-ACNC: 0.2 E.U./DL

## 2024-06-14 PROCEDURE — 99214 OFFICE O/P EST MOD 30 MIN: CPT | Performed by: PHYSICIAN ASSISTANT

## 2024-06-14 PROCEDURE — 36415 COLL VENOUS BLD VENIPUNCTURE: CPT | Performed by: PHYSICIAN ASSISTANT

## 2024-06-14 PROCEDURE — 81003 URINALYSIS AUTO W/O SCOPE: CPT | Performed by: PHYSICIAN ASSISTANT

## 2024-06-14 PROCEDURE — G2211 COMPLEX E/M VISIT ADD ON: HCPCS | Performed by: PHYSICIAN ASSISTANT

## 2024-06-14 PROCEDURE — 80053 COMPREHEN METABOLIC PANEL: CPT | Performed by: PHYSICIAN ASSISTANT

## 2024-06-14 PROCEDURE — 83036 HEMOGLOBIN GLYCOSYLATED A1C: CPT | Performed by: PHYSICIAN ASSISTANT

## 2024-06-14 ASSESSMENT — ENCOUNTER SYMPTOMS
ABDOMINAL PAIN: 1
NAUSEA: 1

## 2024-06-14 ASSESSMENT — PAIN SCALES - GENERAL: PAINLEVEL: MILD PAIN (3)

## 2024-06-14 NOTE — PROGRESS NOTES
"Assessment & Plan     Acute right-sided low back pain without sciatica  Suspect musculoskeletal in nature.  Discussed possibly pain from cyst? Could consider discussing with OBGYN.   Consider x-ray of low back for further evaluation.   No CVA tenderness and UA negative. Less likely kidney stone or kidney infection.    Type 2 diabetes mellitus without complication, without long-term current use of insulin (H)  Patient on Metformin and Mounjaro. Requesting to recheck A1C.   Last checked in Dec 5.2%.  - Hemoglobin A1c  - Hemoglobin A1c    Morbid obesity (H)  Continue to work on weight loss.    FUTURE APPOINTMENTS:       - Follow-up pending lab results    Sushila Arechiga is a 43 year old, presenting for the following health issues:  Kidney Problem        6/14/2024    12:34 PM   Additional Questions   Roomed by Karen NAYAK   Accompanied by None         6/14/2024    12:34 PM   Patient Reported Additional Medications   Patient reports taking the following new medications None     Kidney Problem  The current episode started more than 1 month ago (back pain for about 2 months, a couple weeks of urinary changes). The problem occurs daily. The problem has been unchanged. Associated symptoms include abdominal pain and nausea. Nothing aggravates the symptoms. She has tried NSAIDs and position changes for the symptoms.   History of Present Illness       Reason for visit:  Kidney conserns and labs.    She eats 2-3 servings of fruits and vegetables daily.She consumes 0 sweetened beverage(s) daily.She exercises with enough effort to increase her heart rate 10 to 19 minutes per day.  She exercises with enough effort to increase her heart rate 3 or less days per week.   She is taking medications regularly.     Patient is having lower back pain on right side, patient has ovarian cyst (8cm) on that side but no other gynecologic symptoms, urine has been dark for a few weeks, no blood but describes it as \"iced-tea color\". Worried about " "her kidney function. Pt reports she has diabetes but has been well controlled on Metformin and Mounjaro.Pt denies hx of low back pain. Has tried some NSAIDs and position changes without improvement. No hx of kidney stones and CT abdomen in October showed no kidney stones. Denies colicky-like pain.        Review of Systems  Constitutional, HEENT, cardiovascular, pulmonary, gi and gu systems are negative, except as otherwise noted.      Objective    BP (!) 136/90 (BP Location: Right arm, Patient Position: Sitting, Cuff Size: Adult Regular)   Pulse 85   Temp 97.4  F (36.3  C) (Tympanic)   Resp 16   Ht 1.734 m (5' 8.25\")   Wt 109.5 kg (241 lb 6.4 oz)   LMP 06/07/2024 (Exact Date)   SpO2 99%   BMI 36.44 kg/m    Body mass index is 36.44 kg/m .    Physical Exam   GENERAL: alert and no distress  RESP: lungs clear to auscultation - no rales, rhonchi or wheezes  CV: regular rate and rhythm, normal S1 S2, no S3 or S4, no murmur, click or rub, no peripheral edema  ABDOMEN: soft, nontender, no hepatosplenomegaly, no masses and bowel sounds normal  MS: no gross musculoskeletal defects noted, no edema  SKIN: no suspicious lesions or rashes  NEURO: Normal strength and tone, mentation intact and speech normal  BACK: no CVA tenderness, no paralumbar tenderness  PSYCH: mentation appears normal, affect normal/bright        Signed Electronically by: Purvi Benavides PA-C    "

## 2024-06-15 LAB
ALBUMIN SERPL BCG-MCNC: 4.6 G/DL (ref 3.5–5.2)
ALP SERPL-CCNC: 89 U/L (ref 40–150)
ALT SERPL W P-5'-P-CCNC: 29 U/L (ref 0–50)
ANION GAP SERPL CALCULATED.3IONS-SCNC: 13 MMOL/L (ref 7–15)
AST SERPL W P-5'-P-CCNC: 20 U/L (ref 0–45)
BILIRUB SERPL-MCNC: 0.2 MG/DL
BUN SERPL-MCNC: 12.2 MG/DL (ref 6–20)
CALCIUM SERPL-MCNC: 9.8 MG/DL (ref 8.6–10)
CHLORIDE SERPL-SCNC: 103 MMOL/L (ref 98–107)
CREAT SERPL-MCNC: 0.65 MG/DL (ref 0.51–0.95)
DEPRECATED HCO3 PLAS-SCNC: 24 MMOL/L (ref 22–29)
EGFRCR SERPLBLD CKD-EPI 2021: >90 ML/MIN/1.73M2
GLUCOSE SERPL-MCNC: 89 MG/DL (ref 70–99)
POTASSIUM SERPL-SCNC: 4.8 MMOL/L (ref 3.4–5.3)
PROT SERPL-MCNC: 7.9 G/DL (ref 6.4–8.3)
SODIUM SERPL-SCNC: 140 MMOL/L (ref 135–145)

## 2024-06-20 ENCOUNTER — OFFICE VISIT (OUTPATIENT)
Dept: OBGYN | Facility: CLINIC | Age: 44
End: 2024-06-20
Payer: COMMERCIAL

## 2024-06-20 VITALS
WEIGHT: 241 LBS | DIASTOLIC BLOOD PRESSURE: 90 MMHG | BODY MASS INDEX: 36.53 KG/M2 | HEIGHT: 68 IN | SYSTOLIC BLOOD PRESSURE: 128 MMHG

## 2024-06-20 DIAGNOSIS — Z31.69 ENCOUNTER FOR PRECONCEPTION CONSULTATION: ICD-10-CM

## 2024-06-20 DIAGNOSIS — R54 ADVANCED AGE: ICD-10-CM

## 2024-06-20 DIAGNOSIS — N83.201 RIGHT OVARIAN CYST: Primary | ICD-10-CM

## 2024-06-20 PROCEDURE — 36415 COLL VENOUS BLD VENIPUNCTURE: CPT | Performed by: OBSTETRICS & GYNECOLOGY

## 2024-06-20 PROCEDURE — G2211 COMPLEX E/M VISIT ADD ON: HCPCS | Performed by: OBSTETRICS & GYNECOLOGY

## 2024-06-20 PROCEDURE — 99214 OFFICE O/P EST MOD 30 MIN: CPT | Performed by: OBSTETRICS & GYNECOLOGY

## 2024-06-20 NOTE — NURSING NOTE
".  Chief Complaint   Patient presents with    Ovarian Cyst     Needs MRI wants labs for fertility labs?? Has PCOS       Initial BP (!) 128/90 (BP Location: Left arm, Patient Position: Chair, Cuff Size: Adult Large)   Ht 1.734 m (5' 8.25\")   Wt 109.3 kg (241 lb)   LMP 2024 (Exact Date)   Breastfeeding No   BMI 36.38 kg/m   Estimated body mass index is 36.38 kg/m  as calculated from the following:    Height as of this encounter: 1.734 m (5' 8.25\").    Weight as of this encounter: 109.3 kg (241 lb).  BP completed using cuff size: large    Questioned patient about current smoking habits.  Pt. has never smoked.          The following HM Due: NONE    Claudia Zhu CMA    "

## 2024-06-21 NOTE — PROGRESS NOTES
SUBJECTIVE:                                                   CC:  Patient presents with:  Ovarian Cyst: Needs MRI wants labs for fertility labs?? Has PCOS      HPI:  Ambar Jin is a 43 year old  with history of PCOS who now has regular periods and is interested in preconception counseling and further discussion of a previously noted 8 cm complex anechoic cyst.  The cyst was present in October and 2023, after the second ultrasound she was recommended to consider a pelvic MRI, but she did not do it back at that time.    She does occasionally have low back pain on the right side, but she works in a PT office and has gotten some exercises from her coworkers which have really helped.  She does have some SI joint pain.    Sister and mom both with diagnosis of endometriosis.    She has a historical diagnosis of PCOS, but most recently her cycles have been very irregular, once a month.  She has not done OPK's yet.  Partner is Helio Jin  1989 he has not fathered any children nor has he ever done a semen analysis.  He is generally healthy.  She denies history of PID, chlamydia, gonorrhea, abdominal or pelvic surgery, personal history of endometriosis, or ruptured appendix.  She has had several ultrasounds showing a normal uterus.    Gyn History:  Patient's last menstrual period was 2024 (exact date).        Last 3 Pap and HPV Results:       Latest Ref Rng & Units 2020    11:59 AM   PAP / HPV   PAP Negative for squamous intraepithelial lesion or malignancy. Negative for squamous intraepithelial lesion or malignancy  Electronically signed by Mercy Christopher CT (ASCP) on 2020 at 10:05 AM      HPV 16 DNA NEG Negative    HPV 18 DNA NEG Negative    Other HR HPV NEG Negative        PMH, PSH, Soc Hx, Fam Hx, Meds, and allergies reviewed in Epic.    OBJECTIVE:     BP (!) 128/90 (BP Location: Left arm, Patient Position: Chair, Cuff Size: Adult Large)   Ht 1.734 m (5'  "8.25\")   Wt 109.3 kg (241 lb)   LMP 06/07/2024 (Exact Date)   Breastfeeding No   BMI 36.38 kg/m      Gen: Healthy appearing obese female, no acute distress, comfortable  Psych: mentation appears normal and affect bright  : deferred    Test Results:  LOCATION: [facility]  DATE: 11/17/2023     INDICATION:  Right ovarian cyst.  COMPARISON: 10/16/2022  TECHNIQUE: Transabdominal scans were performed. Endovaginal ultrasound was performed to better visualize the adnexa.     FINDINGS:     UTERUS: Measures 8.3 x 3.9 x 4.8 cm. Anteverted. No uterine masses.     ENDOMETRIUM: 8 mm. Normal smooth endometrium.     RIGHT OVARY: Measures 9.0 x 6.7 x 7.0 cm. It demonstrates normal internal flow. There is approximately 8.0 x 5.9 x 5.2 cm complex anechoic cystic lesion, not significantly changed as compared to 10/16/2023.     LEFT OVARY: Measures 2.5 x 2.1 x 2.8 cm. It demonstrates normal follicular structure and color flow.     No significant free fluid.                                                                      IMPRESSION: Approximately 8 cm complex cystic lesion in the right adnexa, not significantly changed as compared to 10/16/2023 exam, remains indeterminate. This can be further evaluated with contrast-enhanced pelvic MRI for more detailed characterization   if clinically warranted.       ASSESSMENT/PLAN:                                                      1. Encounter for preconception consultation  Discussed PNV, spacing of pregnancies, reviewed medications, discussed carrier screening.  Discussed optimization of weight, blood pressure, and stress reduction.  Discussed calling clinic with a positive pregnancy test and presenting for care at 8 weeks of pregnancy for ultrasound and labs.  Discussed presenting for care if trying to conceive for >6 mo.  Extensive discussion of physiology of pregnancy and different factors for infertility.  She is mod risk of ovulation factor, low risk male " factor, mod risk tubal factor, low risk uterine factor.  The work up for each of these was outlined in detail, including the possibilities to do step-wise testing starting with more likely factors, moving to the more invasive testing that is lower yield and possibly not covered by insurance.      2. Right ovarian cyst  Will start with a pelvic US now that 8 months have gone by and potentially the lesion has had time to resolve.  Can proceed with pelvic MRI after if needed.   - US Pelvic Complete with Transvaginal; Future    3. Advanced age  Elects for pre-parent screening today.  - [facility] Screening-[facility]; Future  - [facility] Screening[facility]     Kassidy Lujan MD, MPH  Obstetrics and Gynecology      Total time spent was 30 minutes; this includes pre-work time, intra-service time, and post-work time including time spent on documentation which occurred on the date of service.

## 2024-06-28 ENCOUNTER — HOSPITAL ENCOUNTER (OUTPATIENT)
Dept: ULTRASOUND IMAGING | Facility: CLINIC | Age: 44
Discharge: HOME | End: 2024-06-28
Attending: OBSTETRICS & GYNECOLOGY | Admitting: OBSTETRICS & GYNECOLOGY
Payer: COMMERCIAL

## 2024-06-28 DIAGNOSIS — N83.201 RIGHT OVARIAN CYST: ICD-10-CM

## 2024-06-28 PROCEDURE — 76856 US EXAM PELVIC COMPLETE: CPT

## 2024-06-28 PROCEDURE — 76830 TRANSVAGINAL US NON-OB: CPT

## 2024-07-02 ENCOUNTER — VIRTUAL VISIT (OUTPATIENT)
Dept: PSYCHOLOGY | Facility: CLINIC | Age: 44
End: 2024-07-02
Payer: COMMERCIAL

## 2024-07-02 DIAGNOSIS — F90.0 ATTENTION DEFICIT HYPERACTIVITY DISORDER, INATTENTIVE TYPE, MODERATE: Primary | ICD-10-CM

## 2024-07-02 LAB — SCANNED LAB RESULT: NORMAL

## 2024-07-02 PROCEDURE — 99207 PR NO CHARGE LOS: CPT | Mod: 95

## 2024-07-02 NOTE — PROGRESS NOTES
Therapist met with Pt briefly. Pt reported seeking psychoeducation and support adjusting to recently Dx of ADHD in Dec 2023. Pt reported MDD and AMANDA symptoms are stable and seeking ADHD provider specifically. Pt reported starting medications in Jan 2024 upon receiving Dx of ADHD and has noticed decrease in task paralysis. Therapist discussed transfer process within [facility] and Pt agreed. Therapist will place referral for transfer.     Therapist transfer Pt to [facility] provider ZULEYKA Demarco. Notified scheduling team request to schedule at next available appointment.

## 2024-07-07 ENCOUNTER — MYC REFILL (OUTPATIENT)
Dept: PEDIATRICS | Facility: CLINIC | Age: 44
End: 2024-07-07
Payer: COMMERCIAL

## 2024-07-07 DIAGNOSIS — F90.0 ATTENTION DEFICIT HYPERACTIVITY DISORDER (ADHD), PREDOMINANTLY INATTENTIVE TYPE: ICD-10-CM

## 2024-07-08 RX ORDER — DEXTROAMPHETAMINE SACCHARATE, AMPHETAMINE ASPARTATE MONOHYDRATE, DEXTROAMPHETAMINE SULFATE AND AMPHETAMINE SULFATE 5; 5; 5; 5 MG/1; MG/1; MG/1; MG/1
20 CAPSULE, EXTENDED RELEASE ORAL DAILY
Qty: 30 CAPSULE | Refills: 0 | Status: SHIPPED | OUTPATIENT
Start: 2024-07-08 | End: 2024-08-07

## 2024-07-08 RX ORDER — DEXTROAMPHETAMINE SACCHARATE, AMPHETAMINE ASPARTATE, DEXTROAMPHETAMINE SULFATE AND AMPHETAMINE SULFATE 2.5; 2.5; 2.5; 2.5 MG/1; MG/1; MG/1; MG/1
TABLET ORAL
Qty: 30 TABLET | Refills: 0 | Status: SHIPPED | OUTPATIENT
Start: 2024-07-08 | End: 2024-08-07

## 2024-07-26 ENCOUNTER — OFFICE VISIT (OUTPATIENT)
Dept: PEDIATRICS | Facility: CLINIC | Age: 44
End: 2024-07-26
Payer: COMMERCIAL

## 2024-07-26 VITALS
HEIGHT: 68 IN | TEMPERATURE: 97.3 F | OXYGEN SATURATION: 99 % | HEART RATE: 79 BPM | RESPIRATION RATE: 18 BRPM | SYSTOLIC BLOOD PRESSURE: 110 MMHG | DIASTOLIC BLOOD PRESSURE: 70 MMHG | BODY MASS INDEX: 37.04 KG/M2 | WEIGHT: 244.4 LBS

## 2024-07-26 DIAGNOSIS — F90.0 ATTENTION DEFICIT HYPERACTIVITY DISORDER (ADHD), PREDOMINANTLY INATTENTIVE TYPE: Primary | ICD-10-CM

## 2024-07-26 PROCEDURE — 99213 OFFICE O/P EST LOW 20 MIN: CPT | Performed by: NURSE PRACTITIONER

## 2024-07-26 PROCEDURE — G2211 COMPLEX E/M VISIT ADD ON: HCPCS | Performed by: NURSE PRACTITIONER

## 2024-07-26 RX ORDER — DEXTROAMPHETAMINE SACCHARATE, AMPHETAMINE ASPARTATE, DEXTROAMPHETAMINE SULFATE AND AMPHETAMINE SULFATE 1.25; 1.25; 1.25; 1.25 MG/1; MG/1; MG/1; MG/1
5 TABLET ORAL
Qty: 30 TABLET | Refills: 0 | Status: SHIPPED | OUTPATIENT
Start: 2024-07-26 | End: 2024-08-26

## 2024-07-26 ASSESSMENT — PAIN SCALES - GENERAL: PAINLEVEL: NO PAIN (0)

## 2024-07-26 NOTE — PROGRESS NOTES
"  Assessment & Plan     Attention deficit hyperactivity disorder (ADHD), predominantly inattentive type  Plan to continue Adderall XR 20 mg in the am, increase booster dose to Adderall IR 15 mg in afternoon. Follow-up e-vsit in 3-4 weeks.   - amphetamine-dextroamphetamine (ADDERALL) 5 MG tablet    The longitudinal plan of care for the diagnosis(es)/condition(s) as documented were addressed during this visit. Due to the added complexity in care, I will continue to support Geni in the subsequent management and with ongoing continuity of care.      Subjective   Geni is a 43 year old, presenting for the following health issues:  RECHECK      7/26/2024     1:16 PM   Additional Questions   Roomed by Devaughn MASTERS   Accompanied by Self         7/26/2024     1:16 PM   Patient Reported Additional Medications   Patient reports taking the following new medications No     Presents today for ADHD follow-up.     Taking Adderall XR 20 mg in the am, takes at 8 am. Wearing off a little quicker recently, around 1-2pm. Taking Adderall IR 10 mg booster earlier in the day as a result, doesn't feel this is helpful.     Started with a therapist who referred her to someone with more experience with ADHD, first appt next week.       Objective    /70 (BP Location: Right arm, Patient Position: Sitting, Cuff Size: Adult Large)   Pulse 79   Temp 97.3  F (36.3  C) (Temporal)   Resp 18   Ht 1.725 m (5' 7.91\")   Wt 110.9 kg (244 lb 6.4 oz)   LMP 07/08/2024 (Exact Date)   SpO2 99%   BMI 37.26 kg/m    Body mass index is 37.26 kg/m .  Physical Exam  Constitutional:       Appearance: Normal appearance.   Cardiovascular:      Rate and Rhythm: Normal rate.   Pulmonary:      Effort: Pulmonary effort is normal. No respiratory distress.   Neurological:      General: No focal deficit present.      Mental Status: She is alert and oriented to person, place, and time.   Psychiatric:         Mood and Affect: Mood normal.         Behavior: Behavior " normal.              Signed Electronically by: CHUY Monge CNP

## 2024-07-30 ENCOUNTER — VIRTUAL VISIT (OUTPATIENT)
Dept: PSYCHOLOGY | Facility: CLINIC | Age: 44
End: 2024-07-30
Payer: COMMERCIAL

## 2024-07-30 DIAGNOSIS — F41.1 GENERALIZED ANXIETY DISORDER: ICD-10-CM

## 2024-07-30 DIAGNOSIS — F90.0 ATTENTION DEFICIT HYPERACTIVITY DISORDER, INATTENTIVE TYPE, MODERATE: ICD-10-CM

## 2024-07-30 DIAGNOSIS — F32.81 PMDD (PREMENSTRUAL DYSPHORIC DISORDER): Primary | ICD-10-CM

## 2024-07-30 PROCEDURE — 90834 PSYTX W PT 45 MINUTES: CPT | Mod: 95 | Performed by: COUNSELOR

## 2024-07-30 ASSESSMENT — ANXIETY QUESTIONNAIRES
6. BECOMING EASILY ANNOYED OR IRRITABLE: NEARLY EVERY DAY
7. FEELING AFRAID AS IF SOMETHING AWFUL MIGHT HAPPEN: MORE THAN HALF THE DAYS
GAD7 TOTAL SCORE: 13
8. IF YOU CHECKED OFF ANY PROBLEMS, HOW DIFFICULT HAVE THESE MADE IT FOR YOU TO DO YOUR WORK, TAKE CARE OF THINGS AT HOME, OR GET ALONG WITH OTHER PEOPLE?: EXTREMELY DIFFICULT
3. WORRYING TOO MUCH ABOUT DIFFERENT THINGS: MORE THAN HALF THE DAYS
2. NOT BEING ABLE TO STOP OR CONTROL WORRYING: MORE THAN HALF THE DAYS
IF YOU CHECKED OFF ANY PROBLEMS ON THIS QUESTIONNAIRE, HOW DIFFICULT HAVE THESE PROBLEMS MADE IT FOR YOU TO DO YOUR WORK, TAKE CARE OF THINGS AT HOME, OR GET ALONG WITH OTHER PEOPLE: EXTREMELY DIFFICULT
5. BEING SO RESTLESS THAT IT IS HARD TO SIT STILL: NOT AT ALL
GAD7 TOTAL SCORE: 13
7. FEELING AFRAID AS IF SOMETHING AWFUL MIGHT HAPPEN: MORE THAN HALF THE DAYS
1. FEELING NERVOUS, ANXIOUS, OR ON EDGE: MORE THAN HALF THE DAYS
GAD7 TOTAL SCORE: 13
4. TROUBLE RELAXING: MORE THAN HALF THE DAYS

## 2024-07-30 ASSESSMENT — COLUMBIA-SUICIDE SEVERITY RATING SCALE - C-SSRS
ATTEMPT LIFETIME: NO
TOTAL  NUMBER OF INTERRUPTED ATTEMPTS LIFETIME: NO
2. HAVE YOU ACTUALLY HAD ANY THOUGHTS OF KILLING YOURSELF?: NO
TOTAL  NUMBER OF ABORTED OR SELF INTERRUPTED ATTEMPTS LIFETIME: NO
1. HAVE YOU WISHED YOU WERE DEAD OR WISHED YOU COULD GO TO SLEEP AND NOT WAKE UP?: NO
6. HAVE YOU EVER DONE ANYTHING, STARTED TO DO ANYTHING, OR PREPARED TO DO ANYTHING TO END YOUR LIFE?: NO

## 2024-07-30 ASSESSMENT — PATIENT HEALTH QUESTIONNAIRE - PHQ9
SUM OF ALL RESPONSES TO PHQ QUESTIONS 1-9: 18
10. IF YOU CHECKED OFF ANY PROBLEMS, HOW DIFFICULT HAVE THESE PROBLEMS MADE IT FOR YOU TO DO YOUR WORK, TAKE CARE OF THINGS AT HOME, OR GET ALONG WITH OTHER PEOPLE: EXTREMELY DIFFICULT
SUM OF ALL RESPONSES TO PHQ QUESTIONS 1-9: 18

## 2024-07-30 NOTE — PROGRESS NOTES
[facility]                                     Progress Note    Patient Name: Ambar Jin  Date: 7/30/2024         Service Type: Individual      Session Start Time: 2:00 pm  Session End Time: 2:47 pm     Session Length: 47 mins     Session #: 1    Attendees: Client attended alone    Service Modality:  Video Visit:      Provider verified identity through the following two step process.  Patient provided:  Patient is known previously to provider    Telemedicine Visit: The patient's condition can be safely assessed and treated via synchronous audio and visual telemedicine encounter.      Reason for Telemedicine Visit: Patient has requested telehealth visit    Originating Site (Patient Location): Patient's home    Distant Site (Provider Location): Provider Remote Setting- Home Office    Consent:  The patient/guardian has verbally consented to: the potential risks and benefits of telemedicine (video visit) versus in person care; bill my insurance or make self-payment for services provided; and responsibility for payment of non-covered services.     Patient would like the video invitation sent by:  My Chart    Mode of Communication:  Video Conference via [employer]    Distant Location (Provider):  Off-site    As the provider I attest to compliance with applicable laws and regulations related to telemedicine.    DATA  Interactive Complexity: No  Crisis: No        Progress Since Last Session (Related to Symptoms / Goals / Homework):   Symptoms: No change      Homework:  NA      Episode of Care Goals:  goals in progress     Current / Ongoing Stressors and Concerns:   Attention/concentration/motivation, trying for pregnancy, hormone related emotional regulation concerns      Treatment Objective(s) Addressed in This Session:   Information gathering as client is a transfer from another provider  Rapport building, tx planning      Intervention:   Solution Focused: tx planning and information gathering for  tx     Assessments completed prior to visit:  The following assessments were completed by patient for this visit:  PHQ9:       10/23/2023     5:03 PM 12/1/2023    10:05 AM 12/13/2023    10:54 AM 1/24/2024     3:53 PM 3/25/2024     1:40 PM 4/15/2024    11:13 AM 7/30/2024    12:32 PM   PHQ-9 SCORE   PHQ-9 Total Score MyChart 15 (Moderately severe depression) 11 (Moderate depression) 9 (Mild depression) 8 (Mild depression) 10 (Moderate depression) 4 (Minimal depression) 18 (Moderately severe depression)   PHQ-9 Total Score 15 11 9 8 10 4 18     GAD7:       6/17/2021     9:00 AM 1/10/2022    12:58 PM 11/14/2023    10:34 AM 12/13/2023    10:55 AM 1/18/2024     4:59 PM 4/8/2024     4:43 PM 7/30/2024    12:32 PM   AMANDA-7 SCORE   Total Score   13 (moderate anxiety) 12 (moderate anxiety) 6 (mild anxiety) 6 (mild anxiety) 13 (moderate anxiety)   Total Score 16 2 13 12 6 6 13         ASSESSMENT: Current Emotional / Mental Status (status of significant symptoms):   Risk status (Self / Other harm or suicidal ideation)   Patient denies current fears or concerns for personal safety.   Patient denies current or recent suicidal ideation or behaviors.   Patient denies current or recent homicidal ideation or behaviors.   Patient denies current or recent self injurious behavior or ideation.   Patient denies other safety concerns.   Patient reports there has been no change in risk factors since their last session.     Patient reports there has been no change in protective factors since their last session.     Recommended that patient call 911 or go to the local ED should there be a change in any of these risk factors.     Appearance:   Appropriate    Eye Contact:   Good    Psychomotor Behavior: Normal    Attitude:   Cooperative    Orientation:   All   Speech    Rate / Production: Normal/ Responsive Normal     Volume:  Normal    Mood:    Anxious  Depressed  Normal   Affect:    Appropriate    Thought Content:  Clear  Rumination    Thought  Form:  Coherent    Insight:    Good      Medication Review:   No changes to current psychiatric medication(s)     Medication Compliance:   Yes     Changes in Health Issues:   Yes: hormone related, Associated Psychological Distress     Chemical Use Review:   Substance Use: Chemical use reviewed, no active concerns identified      Tobacco Use: No current tobacco use.      Diagnosis:  1. PMDD (premenstrual dysphoric disorder)    2. Attention deficit hyperactivity disorder, inattentive type, moderate    3. Generalized anxiety disorder        Collateral Reports Completed:   Not Applicable    PLAN: (Patient Tasks / Therapist Tasks / Other)  Provider assigned client to talk to PCP about endo and psychiatry (for pregnancy) referral and FMLA   Provider assigned client to use multiple alarms in the morning to assist with being on time to work   Provider assigned client to have nutrient dense snacks available  Provider assigned client to use movement breaks to reset attention       Goal setting  Anabela [facility] 7/30/2024      Being to work on time  Emotional regulation elated to PMDD?  Trouble with sustained attention (being present)    Get 9 hours of sleep   Worry about family (health and safety)                                                     Answers submitted by the patient for this visit:  Patient Health Questionnaire (Submitted on 7/30/2024)  If you checked off any problems, how difficult have these problems made it for you to do your work, take care of things at home, or get along with other people?: Extremely difficult  PHQ9 TOTAL SCORE: 18  AMANDA-7 (Submitted on 7/30/2024)  AMANDA 7 TOTAL SCORE: 13

## 2024-07-31 ENCOUNTER — E-VISIT (OUTPATIENT)
Dept: PEDIATRICS | Facility: CLINIC | Age: 44
End: 2024-07-31
Payer: COMMERCIAL

## 2024-07-31 DIAGNOSIS — F41.1 GAD (GENERALIZED ANXIETY DISORDER): Primary | ICD-10-CM

## 2024-07-31 PROCEDURE — 99423 OL DIG E/M SVC 21+ MIN: CPT | Performed by: NURSE PRACTITIONER

## 2024-07-31 ASSESSMENT — ANXIETY QUESTIONNAIRES
7. FEELING AFRAID AS IF SOMETHING AWFUL MIGHT HAPPEN: NEARLY EVERY DAY
3. WORRYING TOO MUCH ABOUT DIFFERENT THINGS: MORE THAN HALF THE DAYS
8. IF YOU CHECKED OFF ANY PROBLEMS, HOW DIFFICULT HAVE THESE MADE IT FOR YOU TO DO YOUR WORK, TAKE CARE OF THINGS AT HOME, OR GET ALONG WITH OTHER PEOPLE?: EXTREMELY DIFFICULT
4. TROUBLE RELAXING: MORE THAN HALF THE DAYS
7. FEELING AFRAID AS IF SOMETHING AWFUL MIGHT HAPPEN: NEARLY EVERY DAY
GAD7 TOTAL SCORE: 14
GAD7 TOTAL SCORE: 14
5. BEING SO RESTLESS THAT IT IS HARD TO SIT STILL: NOT AT ALL
2. NOT BEING ABLE TO STOP OR CONTROL WORRYING: MORE THAN HALF THE DAYS
1. FEELING NERVOUS, ANXIOUS, OR ON EDGE: MORE THAN HALF THE DAYS
GAD7 TOTAL SCORE: 14
IF YOU CHECKED OFF ANY PROBLEMS ON THIS QUESTIONNAIRE, HOW DIFFICULT HAVE THESE PROBLEMS MADE IT FOR YOU TO DO YOUR WORK, TAKE CARE OF THINGS AT HOME, OR GET ALONG WITH OTHER PEOPLE: EXTREMELY DIFFICULT
6. BECOMING EASILY ANNOYED OR IRRITABLE: NEARLY EVERY DAY

## 2024-07-31 ASSESSMENT — PATIENT HEALTH QUESTIONNAIRE - PHQ9
SUM OF ALL RESPONSES TO PHQ QUESTIONS 1-9: 17
SUM OF ALL RESPONSES TO PHQ QUESTIONS 1-9: 17
10. IF YOU CHECKED OFF ANY PROBLEMS, HOW DIFFICULT HAVE THESE PROBLEMS MADE IT FOR YOU TO DO YOUR WORK, TAKE CARE OF THINGS AT HOME, OR GET ALONG WITH OTHER PEOPLE: EXTREMELY DIFFICULT

## 2024-08-01 ASSESSMENT — PATIENT HEALTH QUESTIONNAIRE - PHQ9: SUM OF ALL RESPONSES TO PHQ QUESTIONS 1-9: 17

## 2024-08-02 RX ORDER — HYDROXYZINE HYDROCHLORIDE 25 MG/1
25 TABLET, FILM COATED ORAL 3 TIMES DAILY PRN
Qty: 20 TABLET | Refills: 0 | Status: SHIPPED | OUTPATIENT
Start: 2024-08-02

## 2024-08-02 RX ORDER — FLUOXETINE 10 MG/1
CAPSULE ORAL
Qty: 90 CAPSULE | Refills: 0 | Status: SHIPPED | OUTPATIENT
Start: 2024-08-02 | End: 2024-08-08

## 2024-08-02 NOTE — PATIENT INSTRUCTIONS
Thank you for choosing us for your care. I have placed an order for your treatment:   Orders Placed This Encounter   Medications     FLUoxetine (PROZAC) 10 MG capsule     Sig: Take 1 capsule (10 mg) by mouth daily for 7 days, THEN 2 capsules (20 mg) daily for 30 days.     Dispense:  90 capsule     Refill:  0     hydrOXYzine HCl (ATARAX) 25 MG tablet     Sig: Take 1 tablet (25 mg) by mouth 3 times daily as needed for anxiety     Dispense:  20 tablet     Refill:  0      View your full visit summary for details by clicking on the link below. Your pharmacist will able to address any questions you may have about the medication.      If you're not feeling better within 4-6 weeks please schedule an appointment.  You can schedule an appointment right here in [facility], or call [phone]  If the visit is for the same symptoms as your eVisit, we'll refund the cost of your eVisit if seen within seven days.

## 2024-08-07 ENCOUNTER — MYC REFILL (OUTPATIENT)
Dept: PEDIATRICS | Facility: CLINIC | Age: 44
End: 2024-08-07
Payer: COMMERCIAL

## 2024-08-07 DIAGNOSIS — F90.0 ATTENTION DEFICIT HYPERACTIVITY DISORDER (ADHD), PREDOMINANTLY INATTENTIVE TYPE: ICD-10-CM

## 2024-08-07 RX ORDER — DEXTROAMPHETAMINE SACCHARATE, AMPHETAMINE ASPARTATE MONOHYDRATE, DEXTROAMPHETAMINE SULFATE AND AMPHETAMINE SULFATE 5; 5; 5; 5 MG/1; MG/1; MG/1; MG/1
20 CAPSULE, EXTENDED RELEASE ORAL DAILY
Qty: 30 CAPSULE | Refills: 0 | Status: SHIPPED | OUTPATIENT
Start: 2024-08-07 | End: 2024-08-22

## 2024-08-07 RX ORDER — DEXTROAMPHETAMINE SACCHARATE, AMPHETAMINE ASPARTATE, DEXTROAMPHETAMINE SULFATE AND AMPHETAMINE SULFATE 2.5; 2.5; 2.5; 2.5 MG/1; MG/1; MG/1; MG/1
TABLET ORAL
Qty: 30 TABLET | Refills: 0 | Status: SHIPPED | OUTPATIENT
Start: 2024-08-07 | End: 2024-09-05

## 2024-08-21 ENCOUNTER — MYC MEDICAL ADVICE (OUTPATIENT)
Dept: PEDIATRICS | Facility: CLINIC | Age: 44
End: 2024-08-21
Payer: COMMERCIAL

## 2024-08-21 ENCOUNTER — MYC REFILL (OUTPATIENT)
Dept: PEDIATRICS | Facility: CLINIC | Age: 44
End: 2024-08-21
Payer: COMMERCIAL

## 2024-08-21 DIAGNOSIS — F90.0 ATTENTION DEFICIT HYPERACTIVITY DISORDER (ADHD), PREDOMINANTLY INATTENTIVE TYPE: Primary | ICD-10-CM

## 2024-08-21 DIAGNOSIS — F90.0 ATTENTION DEFICIT HYPERACTIVITY DISORDER (ADHD), PREDOMINANTLY INATTENTIVE TYPE: ICD-10-CM

## 2024-08-22 RX ORDER — DEXTROAMPHETAMINE SACCHARATE, AMPHETAMINE ASPARTATE MONOHYDRATE, DEXTROAMPHETAMINE SULFATE AND AMPHETAMINE SULFATE 5; 5; 5; 5 MG/1; MG/1; MG/1; MG/1
20 CAPSULE, EXTENDED RELEASE ORAL DAILY
Qty: 30 CAPSULE | Refills: 0 | Status: SHIPPED | OUTPATIENT
Start: 2024-08-22 | End: 2024-09-18

## 2024-08-22 RX ORDER — DEXTROAMPHETAMINE SACCHARATE, AMPHETAMINE ASPARTATE MONOHYDRATE, DEXTROAMPHETAMINE SULFATE AND AMPHETAMINE SULFATE 5; 5; 5; 5 MG/1; MG/1; MG/1; MG/1
20 CAPSULE, EXTENDED RELEASE ORAL DAILY
Qty: 30 CAPSULE | Refills: 0 | Status: SHIPPED | OUTPATIENT
Start: 2024-08-22

## 2024-08-22 NOTE — TELEPHONE ENCOUNTER
Patient sent mychart re: Adderall 20mg XR    - Medication on back order at [facility]  - Requesting 20mg XR adderall be sent to [facility] (pharmacy vandana'd up)  - DID  10mg IR from [facility]    Please cancel 20mg XR adderall at [facility] and re-send to [facility] (RN unable to as it is a controlled substance)    Thanks!  Yanna Salcido, RN

## 2024-08-25 DIAGNOSIS — E66.01 MORBID OBESITY (H): ICD-10-CM

## 2024-08-25 DIAGNOSIS — E11.9 TYPE 2 DIABETES MELLITUS WITHOUT COMPLICATION, WITHOUT LONG-TERM CURRENT USE OF INSULIN (H): ICD-10-CM

## 2024-08-26 ENCOUNTER — MYC REFILL (OUTPATIENT)
Dept: PEDIATRICS | Facility: CLINIC | Age: 44
End: 2024-08-26
Payer: COMMERCIAL

## 2024-08-26 DIAGNOSIS — F90.0 ATTENTION DEFICIT HYPERACTIVITY DISORDER (ADHD), PREDOMINANTLY INATTENTIVE TYPE: ICD-10-CM

## 2024-08-26 RX ORDER — DEXTROAMPHETAMINE SACCHARATE, AMPHETAMINE ASPARTATE, DEXTROAMPHETAMINE SULFATE AND AMPHETAMINE SULFATE 1.25; 1.25; 1.25; 1.25 MG/1; MG/1; MG/1; MG/1
5 TABLET ORAL
Qty: 30 TABLET | Refills: 0 | Status: SHIPPED | OUTPATIENT
Start: 2024-08-26 | End: 2024-09-25

## 2024-08-26 RX ORDER — TIRZEPATIDE 12.5 MG/.5ML
12.5 INJECTION, SOLUTION SUBCUTANEOUS
Qty: 6 ML | Refills: 0 | Status: SHIPPED | OUTPATIENT
Start: 2024-08-26 | End: 2024-09-05

## 2024-09-05 ENCOUNTER — MYC MEDICAL ADVICE (OUTPATIENT)
Dept: PEDIATRICS | Facility: CLINIC | Age: 44
End: 2024-09-05
Payer: COMMERCIAL

## 2024-09-05 ENCOUNTER — MYC REFILL (OUTPATIENT)
Dept: PEDIATRICS | Facility: CLINIC | Age: 44
End: 2024-09-05
Payer: COMMERCIAL

## 2024-09-05 DIAGNOSIS — F90.0 ATTENTION DEFICIT HYPERACTIVITY DISORDER (ADHD), PREDOMINANTLY INATTENTIVE TYPE: ICD-10-CM

## 2024-09-05 DIAGNOSIS — E66.01 MORBID OBESITY (H): ICD-10-CM

## 2024-09-05 DIAGNOSIS — F41.1 GAD (GENERALIZED ANXIETY DISORDER): ICD-10-CM

## 2024-09-05 DIAGNOSIS — E11.9 TYPE 2 DIABETES MELLITUS WITHOUT COMPLICATION, WITHOUT LONG-TERM CURRENT USE OF INSULIN (H): ICD-10-CM

## 2024-09-06 RX ORDER — TIRZEPATIDE 12.5 MG/.5ML
12.5 INJECTION, SOLUTION SUBCUTANEOUS
Qty: 6 ML | Refills: 0 | Status: SHIPPED | OUTPATIENT
Start: 2024-09-06

## 2024-09-06 NOTE — TELEPHONE ENCOUNTER
Ira- please see [employer] message. Pt reporting positive outcome from Prozac.     Mervat Esparza RN

## 2024-09-06 NOTE — TELEPHONE ENCOUNTER
RN attempted to route prozac to patient's preferred pharmacy ([id number] - [facility], [place] - [address] [place] [address] AT [facility] ) however interaction warning occurred, RN unable to proceed with routing prescription to new pharmacy.    Please review and sign/send to [facility]    Yanna Salcido RN

## 2024-09-08 RX ORDER — DEXTROAMPHETAMINE SACCHARATE, AMPHETAMINE ASPARTATE, DEXTROAMPHETAMINE SULFATE AND AMPHETAMINE SULFATE 2.5; 2.5; 2.5; 2.5 MG/1; MG/1; MG/1; MG/1
TABLET ORAL
Qty: 30 TABLET | Refills: 0 | Status: SHIPPED | OUTPATIENT
Start: 2024-09-08

## 2024-09-18 ENCOUNTER — MYC REFILL (OUTPATIENT)
Dept: PEDIATRICS | Facility: CLINIC | Age: 44
End: 2024-09-18
Payer: COMMERCIAL

## 2024-09-18 DIAGNOSIS — F90.0 ATTENTION DEFICIT HYPERACTIVITY DISORDER (ADHD), PREDOMINANTLY INATTENTIVE TYPE: ICD-10-CM

## 2024-09-18 RX ORDER — DEXTROAMPHETAMINE SACCHARATE, AMPHETAMINE ASPARTATE MONOHYDRATE, DEXTROAMPHETAMINE SULFATE AND AMPHETAMINE SULFATE 5; 5; 5; 5 MG/1; MG/1; MG/1; MG/1
20 CAPSULE, EXTENDED RELEASE ORAL DAILY
Qty: 30 CAPSULE | Refills: 0 | Status: SHIPPED | OUTPATIENT
Start: 2024-09-18 | End: 2024-09-24

## 2024-09-24 ENCOUNTER — MYC REFILL (OUTPATIENT)
Dept: PEDIATRICS | Facility: CLINIC | Age: 44
End: 2024-09-24
Payer: COMMERCIAL

## 2024-09-24 DIAGNOSIS — F90.0 ATTENTION DEFICIT HYPERACTIVITY DISORDER (ADHD), PREDOMINANTLY INATTENTIVE TYPE: ICD-10-CM

## 2024-09-24 RX ORDER — DEXTROAMPHETAMINE SACCHARATE, AMPHETAMINE ASPARTATE MONOHYDRATE, DEXTROAMPHETAMINE SULFATE AND AMPHETAMINE SULFATE 5; 5; 5; 5 MG/1; MG/1; MG/1; MG/1
20 CAPSULE, EXTENDED RELEASE ORAL DAILY
Qty: 30 CAPSULE | Refills: 0 | Status: SHIPPED | OUTPATIENT
Start: 2024-09-24

## 2024-09-25 ENCOUNTER — MYC REFILL (OUTPATIENT)
Dept: PEDIATRICS | Facility: CLINIC | Age: 44
End: 2024-09-25
Payer: COMMERCIAL

## 2024-09-25 DIAGNOSIS — F90.0 ATTENTION DEFICIT HYPERACTIVITY DISORDER (ADHD), PREDOMINANTLY INATTENTIVE TYPE: ICD-10-CM

## 2024-09-26 RX ORDER — DEXTROAMPHETAMINE SACCHARATE, AMPHETAMINE ASPARTATE, DEXTROAMPHETAMINE SULFATE AND AMPHETAMINE SULFATE 1.25; 1.25; 1.25; 1.25 MG/1; MG/1; MG/1; MG/1
5 TABLET ORAL
Qty: 30 TABLET | Refills: 0 | Status: SHIPPED | OUTPATIENT
Start: 2024-09-26

## 2024-09-29 ENCOUNTER — HEALTH MAINTENANCE LETTER (OUTPATIENT)
Age: 44
End: 2024-09-29

## 2024-09-30 ENCOUNTER — OFFICE VISIT (OUTPATIENT)
Dept: FAMILY MEDICINE | Facility: CLINIC | Age: 44
End: 2024-09-30
Payer: COMMERCIAL

## 2024-09-30 VITALS
HEIGHT: 67 IN | DIASTOLIC BLOOD PRESSURE: 66 MMHG | SYSTOLIC BLOOD PRESSURE: 116 MMHG | OXYGEN SATURATION: 98 % | WEIGHT: 241.3 LBS | RESPIRATION RATE: 14 BRPM | BODY MASS INDEX: 37.87 KG/M2 | TEMPERATURE: 98.9 F | HEART RATE: 78 BPM

## 2024-09-30 DIAGNOSIS — B34.9 VIRAL ILLNESS: Primary | ICD-10-CM

## 2024-09-30 DIAGNOSIS — R50.9 FEVER, UNSPECIFIED FEVER CAUSE: ICD-10-CM

## 2024-09-30 DIAGNOSIS — R53.83 FATIGUE, UNSPECIFIED TYPE: ICD-10-CM

## 2024-09-30 DIAGNOSIS — M54.50 ACUTE RIGHT-SIDED LOW BACK PAIN WITHOUT SCIATICA: ICD-10-CM

## 2024-09-30 DIAGNOSIS — R52 BODY ACHES: ICD-10-CM

## 2024-09-30 LAB
ALBUMIN UR-MCNC: ABNORMAL MG/DL
APPEARANCE UR: CLEAR
BACTERIA #/AREA URNS HPF: ABNORMAL /HPF
BASOPHILS # BLD AUTO: 0 10E3/UL (ref 0–0.2)
BASOPHILS NFR BLD AUTO: 0 %
BILIRUB UR QL STRIP: NEGATIVE
COLOR UR AUTO: YELLOW
EOSINOPHIL # BLD AUTO: 0.3 10E3/UL (ref 0–0.7)
EOSINOPHIL NFR BLD AUTO: 3 %
ERYTHROCYTE [DISTWIDTH] IN BLOOD BY AUTOMATED COUNT: 14.9 % (ref 10–15)
FLUAV RNA SPEC QL NAA+PROBE: NEGATIVE
FLUBV RNA RESP QL NAA+PROBE: NEGATIVE
GLUCOSE UR STRIP-MCNC: NEGATIVE MG/DL
HCT VFR BLD AUTO: 38.2 % (ref 35–47)
HGB BLD-MCNC: 12.2 G/DL (ref 11.7–15.7)
HGB UR QL STRIP: NEGATIVE
IMM GRANULOCYTES # BLD: 0 10E3/UL
IMM GRANULOCYTES NFR BLD: 0 %
KETONES UR STRIP-MCNC: 40 MG/DL
LEUKOCYTE ESTERASE UR QL STRIP: NEGATIVE
LYMPHOCYTES # BLD AUTO: 1.5 10E3/UL (ref 0.8–5.3)
LYMPHOCYTES NFR BLD AUTO: 15 %
MCH RBC QN AUTO: 24.5 PG (ref 26.5–33)
MCHC RBC AUTO-ENTMCNC: 31.9 G/DL (ref 31.5–36.5)
MCV RBC AUTO: 77 FL (ref 78–100)
MONOCYTES # BLD AUTO: 0.6 10E3/UL (ref 0–1.3)
MONOCYTES NFR BLD AUTO: 6 %
MONOCYTES NFR BLD AUTO: NEGATIVE %
NEUTROPHILS # BLD AUTO: 7.5 10E3/UL (ref 1.6–8.3)
NEUTROPHILS NFR BLD AUTO: 76 %
NITRATE UR QL: NEGATIVE
PH UR STRIP: 6 [PH] (ref 5–8)
PLATELET # BLD AUTO: 371 10E3/UL (ref 150–450)
RBC # BLD AUTO: 4.98 10E6/UL (ref 3.8–5.2)
RBC #/AREA URNS AUTO: ABNORMAL /HPF
RSV RNA SPEC NAA+PROBE: NEGATIVE
SARS-COV-2 RNA RESP QL NAA+PROBE: NEGATIVE
SP GR UR STRIP: >=1.03 (ref 1–1.03)
SQUAMOUS #/AREA URNS AUTO: ABNORMAL /LPF
UROBILINOGEN UR STRIP-ACNC: 0.2 E.U./DL
WBC # BLD AUTO: 9.8 10E3/UL (ref 4–11)
WBC #/AREA URNS AUTO: ABNORMAL /HPF

## 2024-09-30 PROCEDURE — 81001 URINALYSIS AUTO W/SCOPE: CPT | Performed by: NURSE PRACTITIONER

## 2024-09-30 PROCEDURE — 87637 SARSCOV2&INF A&B&RSV AMP PRB: CPT | Performed by: NURSE PRACTITIONER

## 2024-09-30 PROCEDURE — 36415 COLL VENOUS BLD VENIPUNCTURE: CPT | Performed by: NURSE PRACTITIONER

## 2024-09-30 PROCEDURE — 85025 COMPLETE CBC W/AUTO DIFF WBC: CPT | Performed by: NURSE PRACTITIONER

## 2024-09-30 PROCEDURE — 99213 OFFICE O/P EST LOW 20 MIN: CPT | Performed by: NURSE PRACTITIONER

## 2024-09-30 PROCEDURE — 86308 HETEROPHILE ANTIBODY SCREEN: CPT | Performed by: NURSE PRACTITIONER

## 2024-09-30 ASSESSMENT — ENCOUNTER SYMPTOMS
FEVER: 1
HEADACHES: 1
FATIGUE: 1

## 2024-09-30 ASSESSMENT — PATIENT HEALTH QUESTIONNAIRE - PHQ9
10. IF YOU CHECKED OFF ANY PROBLEMS, HOW DIFFICULT HAVE THESE PROBLEMS MADE IT FOR YOU TO DO YOUR WORK, TAKE CARE OF THINGS AT HOME, OR GET ALONG WITH OTHER PEOPLE: VERY DIFFICULT
SUM OF ALL RESPONSES TO PHQ QUESTIONS 1-9: 14
SUM OF ALL RESPONSES TO PHQ QUESTIONS 1-9: 14

## 2024-09-30 ASSESSMENT — PAIN SCALES - GENERAL: PAINLEVEL: MODERATE PAIN (5)

## 2024-09-30 NOTE — LETTER
2024    Ambar Jin   1980        To Whom it May Concern;    Please excuse Ambar Jin from work 24-10/2/24 due to illness.      Thank you          Sincerely,        Chantal Palacios, CNP

## 2024-09-30 NOTE — PROGRESS NOTES
"    Sushila Arechiga is a 43 year old, presenting for the following health issues:  Fatigue (Headaches, body aches, temp.100.3 since Saturday.)      9/30/2024    12:43 PM   Additional Questions   Roomed by  LPN     History of Present Illness       Back Pain:  She presents for follow up of back pain. Patient's back pain is a recurring problem.  Location of back pain:  Right lower back, right middle of back, left middle of back, right upper back, left upper back, right buttock, right hip and right side of waist  Description of back pain: burning, cramping, fullness, gnawing, sharp, shooting and stabbing  Back pain spreads: nowhere    Since patient first noticed back pain, pain is: gradually worsening  Does back pain interfere with her job:  No       Headaches:   Since the patient's last clinic visit, headaches are: worsened  The patient is getting headaches:  Constant since saturday  She is not able to do normal daily activities when she has a migraine.  The patient is taking the following rescue/relief medications:  Ibuprofen (Advil, Motrin)   Patient states \"I get no relief\" from the rescue/relief medications.   The patient is taking the following medications to prevent migraines:  No medications to prevent migraines  In the past 4 weeks, the patient has gone to an Urgent Care or Emergency Room 0 times times due to headaches.    Reason for visit:  Possible mono  Symptom onset:  1-3 days ago  Symptoms include:  Fever headache body aches sore lymph nodes fatigue  Symptom intensity:  Severe  Symptom progression:  Worsening  Had these symptoms before:  Yes  Has tried/received treatment for these symptoms:  No  What makes it worse:  Being awake  What makes it better:  Nothing   She is taking medications regularly.       Acute Illness  Acute illness concerns: fever, headache, body aches   Onset/Duration: x 3 days  Symptoms:  Fever: YES  Chills/Sweats: YES  Headache (location?): YES  Sinus Pressure: No  Conjunctivitis: " " No  Ear Pain: no  Rhinorrhea: No  Congestion: No  Sore Throat: No  Cough: no  Wheeze: No  Decreased Appetite: YES  Nausea: No  Vomiting: No  Diarrhea: No  Dysuria/Freq.: No  Dysuria or Hematuria: No  Fatigue/Achiness: YES  Sick/Strep Exposure: No  Therapies tried and outcome: None  Right sides low back pain.       Objective    /66 (BP Location: Left arm, Patient Position: Sitting, Cuff Size: Adult Regular)   Pulse 78   Temp 98.9  F (37.2  C) (Oral)   Resp 14   Ht 1.702 m (5' 7\")   Wt 109.5 kg (241 lb 4.8 oz)   LMP 09/14/2024   SpO2 98%   Breastfeeding No   BMI 37.79 kg/m    Body mass index is 37.79 kg/m .  Physical Exam   GENERAL: alert and no distress  HENT: normal cephalic/atraumatic, ear canals and TM's normal, nose and mouth without ulcers or lesions, oropharynx clear, and oral mucous membranes moist  RESP: lungs clear to auscultation - no rales, rhonchi or wheezes  CV: regular rate and rhythm, normal S1 S2, no S3 or S4, no murmur, click or rub, no peripheral edema   MS: no gross musculoskeletal defects noted, no edema  NEURO: Normal strength and tone, mentation intact and speech normal  PSYCH: mentation appears normal, affect normal/bright    Results for orders placed or performed in visit on 09/30/24 (from the past 24 hour(s))   CBC with platelets and differential    Narrative    The following orders were created for panel order CBC with platelets and differential.  Procedure                               Abnormality         Status                     ---------                               -----------         ------                     CBC with platelets and d...[[id number]]  Abnormal            Final result                 Please view results for these tests on the individual orders.   Mononucleosis screen   Result Value Ref Range    Mononucleosis Screen Negative Negative   CBC with platelets and differential   Result Value Ref Range    WBC Count 9.8 4.0 - 11.0 10e3/uL    RBC Count 4.98 3.80 - " 5.20 10e6/uL    Hemoglobin 12.2 11.7 - 15.7 g/dL    Hematocrit 38.2 35.0 - 47.0 %    MCV 77 (L) 78 - 100 fL    MCH 24.5 (L) 26.5 - 33.0 pg    MCHC 31.9 31.5 - 36.5 g/dL    RDW 14.9 10.0 - 15.0 %    Platelet Count 371 150 - 450 10e3/uL    % Neutrophils 76 %    % Lymphocytes 15 %    % Monocytes 6 %    % Eosinophils 3 %    % Basophils 0 %    % Immature Granulocytes 0 %    Absolute Neutrophils 7.5 1.6 - 8.3 10e3/uL    Absolute Lymphocytes 1.5 0.8 - 5.3 10e3/uL    Absolute Monocytes 0.6 0.0 - 1.3 10e3/uL    Absolute Eosinophils 0.3 0.0 - 0.7 10e3/uL    Absolute Basophils 0.0 0.0 - 0.2 10e3/uL    Absolute Immature Granulocytes 0.0 <=0.4 10e3/uL   UA Macroscopic with reflex to Microscopic and Culture - Lab Collect    Specimen: Urine, Clean Catch   Result Value Ref Range    Color Urine Yellow Colorless, Straw, Light Yellow, Yellow    Appearance Urine Clear Clear    Glucose Urine Negative Negative mg/dL    Bilirubin Urine Negative Negative    Ketones Urine 40 (A) Negative mg/dL    Specific Gravity Urine >=1.030 1.005 - 1.030    Blood Urine Negative Negative    pH Urine 6.0 5.0 - 8.0    Protein Albumin Urine Trace (A) Negative mg/dL    Urobilinogen Urine 0.2 0.2, 1.0 E.U./dL    Nitrite Urine Negative Negative    Leukocyte Esterase Urine Negative Negative   UA Microscopic with Reflex to Culture   Result Value Ref Range    Bacteria Urine Moderate (A) None Seen /HPF    RBC Urine 0-2 0-2 /HPF /HPF    WBC Urine 0-5 0-5 /HPF /HPF    Squamous Epithelials Urine Moderate (A) None Seen /LPF    Narrative    Microscopic exam performed on unspun urine.   Urine Culture not indicated         A/P:  1. Fever, unspecified fever cause  - Symptomatic Influenza A/B, RSV, & SARS-CoV2 PCR (COVID-19) Nasopharyngeal; Future  - CBC with platelets and differential; Future  - Mononucleosis screen; Future  - Symptomatic Influenza A/B, RSV, & SARS-CoV2 PCR (COVID-19) Nasopharyngeal  - CBC with platelets and differential  - Mononucleosis screen    2. Body  aches  - Symptomatic Influenza A/B, RSV, & SARS-CoV2 PCR (COVID-19) Nasopharyngeal; Future  - CBC with platelets and differential; Future  - Mononucleosis screen; Future  - Symptomatic Influenza A/B, RSV, & SARS-CoV2 PCR (COVID-19) Nasopharyngeal  - CBC with platelets and differential  - Mononucleosis screen    3. Fatigue, unspecified type  - CBC with platelets and differential; Future  - Mononucleosis screen; Future  - CBC with platelets and differential  - Mononucleosis screen    4. Viral illness  - CBC with platelets and differential; Future  - Mononucleosis screen; Future  - CBC with platelets and differential  - Mononucleosis screen    5. Acute right-sided low back pain without sciatica  - UA Macroscopic with reflex to Microscopic and Culture - Lab Collect; Future  - UA Macroscopic with reflex to Microscopic and Culture - Lab Collect  - UA Microscopic with Reflex to Culture    Signed Electronically by: Chantal Palacios, CNP

## 2024-10-01 ENCOUNTER — MYC REFILL (OUTPATIENT)
Dept: PEDIATRICS | Facility: CLINIC | Age: 44
End: 2024-10-01
Payer: COMMERCIAL

## 2024-10-01 ENCOUNTER — MYC MEDICAL ADVICE (OUTPATIENT)
Dept: FAMILY MEDICINE | Facility: CLINIC | Age: 44
End: 2024-10-01
Payer: COMMERCIAL

## 2024-10-01 DIAGNOSIS — G43.009 MIGRAINE WITHOUT AURA AND WITHOUT STATUS MIGRAINOSUS, NOT INTRACTABLE: ICD-10-CM

## 2024-10-01 RX ORDER — TRAMADOL HYDROCHLORIDE 50 MG/1
50 TABLET ORAL EVERY 6 HOURS PRN
Qty: 20 TABLET | Refills: 0 | Status: SHIPPED | OUTPATIENT
Start: 2024-10-01

## 2024-10-02 NOTE — TELEPHONE ENCOUNTER
Left message to return call. If patient calls back, please route to [facility].     TCs, please send to RNs.    Gabino Dunn RN  [facility]

## 2024-10-03 NOTE — TELEPHONE ENCOUNTER
Left message to return call. If patient calls back, please route to [facility].     TCs, please send to RNs.    Gabino Dunn RN  [facility]

## 2024-10-04 NOTE — TELEPHONE ENCOUNTER
Left message to return call. If patient calls back, please route to [facility].     TCs, please send to RNs.    Third outreach attempt, signing encounter.    Gabino Dunn RN  [facility]

## 2024-10-08 ENCOUNTER — MYC REFILL (OUTPATIENT)
Dept: PEDIATRICS | Facility: CLINIC | Age: 44
End: 2024-10-08
Payer: COMMERCIAL

## 2024-10-08 DIAGNOSIS — F90.0 ATTENTION DEFICIT HYPERACTIVITY DISORDER (ADHD), PREDOMINANTLY INATTENTIVE TYPE: ICD-10-CM

## 2024-10-08 RX ORDER — DEXTROAMPHETAMINE SACCHARATE, AMPHETAMINE ASPARTATE, DEXTROAMPHETAMINE SULFATE AND AMPHETAMINE SULFATE 2.5; 2.5; 2.5; 2.5 MG/1; MG/1; MG/1; MG/1
TABLET ORAL
Qty: 30 TABLET | Refills: 0 | Status: SHIPPED | OUTPATIENT
Start: 2024-10-08 | End: 2024-11-06

## 2024-10-22 ENCOUNTER — MYC REFILL (OUTPATIENT)
Dept: PEDIATRICS | Facility: CLINIC | Age: 44
End: 2024-10-22
Payer: COMMERCIAL

## 2024-10-22 DIAGNOSIS — F90.0 ATTENTION DEFICIT HYPERACTIVITY DISORDER (ADHD), PREDOMINANTLY INATTENTIVE TYPE: ICD-10-CM

## 2024-10-22 RX ORDER — DEXTROAMPHETAMINE SACCHARATE, AMPHETAMINE ASPARTATE, DEXTROAMPHETAMINE SULFATE AND AMPHETAMINE SULFATE 1.25; 1.25; 1.25; 1.25 MG/1; MG/1; MG/1; MG/1
5 TABLET ORAL
Qty: 30 TABLET | Refills: 0 | Status: SHIPPED | OUTPATIENT
Start: 2024-10-25

## 2024-10-22 RX ORDER — DEXTROAMPHETAMINE SACCHARATE, AMPHETAMINE ASPARTATE MONOHYDRATE, DEXTROAMPHETAMINE SULFATE AND AMPHETAMINE SULFATE 5; 5; 5; 5 MG/1; MG/1; MG/1; MG/1
20 CAPSULE, EXTENDED RELEASE ORAL DAILY
Qty: 30 CAPSULE | Refills: 0 | Status: SHIPPED | OUTPATIENT
Start: 2024-11-01

## 2024-10-27 DIAGNOSIS — E66.01 MORBID OBESITY (H): ICD-10-CM

## 2024-10-27 DIAGNOSIS — E11.9 TYPE 2 DIABETES MELLITUS WITHOUT COMPLICATION, WITHOUT LONG-TERM CURRENT USE OF INSULIN (H): ICD-10-CM

## 2024-10-28 RX ORDER — TIRZEPATIDE 12.5 MG/.5ML
INJECTION, SOLUTION SUBCUTANEOUS
OUTPATIENT
Start: 2024-10-28

## 2024-11-04 ENCOUNTER — OFFICE VISIT (OUTPATIENT)
Dept: PEDIATRICS | Facility: CLINIC | Age: 44
End: 2024-11-04
Payer: COMMERCIAL

## 2024-11-04 ENCOUNTER — PATIENT OUTREACH (OUTPATIENT)
Dept: CARE COORDINATION | Facility: CLINIC | Age: 44
End: 2024-11-04

## 2024-11-04 VITALS
OXYGEN SATURATION: 100 % | BODY MASS INDEX: 35.95 KG/M2 | HEART RATE: 89 BPM | HEIGHT: 68 IN | RESPIRATION RATE: 14 BRPM | TEMPERATURE: 98 F | WEIGHT: 237.2 LBS | DIASTOLIC BLOOD PRESSURE: 88 MMHG | SYSTOLIC BLOOD PRESSURE: 144 MMHG

## 2024-11-04 DIAGNOSIS — F33.0 MILD RECURRENT MAJOR DEPRESSION (H): ICD-10-CM

## 2024-11-04 DIAGNOSIS — E66.01 MORBID OBESITY (H): ICD-10-CM

## 2024-11-04 DIAGNOSIS — R20.2 PARESTHESIA OF ARM: ICD-10-CM

## 2024-11-04 DIAGNOSIS — E11.9 TYPE 2 DIABETES MELLITUS WITHOUT COMPLICATION, WITHOUT LONG-TERM CURRENT USE OF INSULIN (H): ICD-10-CM

## 2024-11-04 DIAGNOSIS — R53.82 CHRONIC FATIGUE: Primary | ICD-10-CM

## 2024-11-04 DIAGNOSIS — J45.20 MILD INTERMITTENT ASTHMA WITHOUT COMPLICATION: ICD-10-CM

## 2024-11-04 DIAGNOSIS — F41.1 GAD (GENERALIZED ANXIETY DISORDER): ICD-10-CM

## 2024-11-04 LAB
CHOLEST SERPL-MCNC: 131 MG/DL
FASTING STATUS PATIENT QL REPORTED: YES
HDLC SERPL-MCNC: 53 MG/DL
LDLC SERPL CALC-MCNC: 59 MG/DL
NONHDLC SERPL-MCNC: 78 MG/DL
TRIGL SERPL-MCNC: 94 MG/DL
TSH SERPL DL<=0.005 MIU/L-ACNC: 1.8 UIU/ML (ref 0.3–4.2)
VIT D+METAB SERPL-MCNC: 87 NG/ML (ref 20–50)

## 2024-11-04 PROCEDURE — 82306 VITAMIN D 25 HYDROXY: CPT | Performed by: NURSE PRACTITIONER

## 2024-11-04 PROCEDURE — 84443 ASSAY THYROID STIM HORMONE: CPT | Performed by: NURSE PRACTITIONER

## 2024-11-04 PROCEDURE — 90656 IIV3 VACC NO PRSV 0.5 ML IM: CPT | Performed by: NURSE PRACTITIONER

## 2024-11-04 PROCEDURE — 99214 OFFICE O/P EST MOD 30 MIN: CPT | Mod: 25 | Performed by: NURSE PRACTITIONER

## 2024-11-04 PROCEDURE — 36415 COLL VENOUS BLD VENIPUNCTURE: CPT | Performed by: NURSE PRACTITIONER

## 2024-11-04 PROCEDURE — 90480 ADMN SARSCOV2 VAC 1/ONLY CMP: CPT | Performed by: NURSE PRACTITIONER

## 2024-11-04 PROCEDURE — 80061 LIPID PANEL: CPT | Performed by: NURSE PRACTITIONER

## 2024-11-04 PROCEDURE — 91320 SARSCV2 VAC 30MCG TRS-SUC IM: CPT | Performed by: NURSE PRACTITIONER

## 2024-11-04 PROCEDURE — 90471 IMMUNIZATION ADMIN: CPT | Performed by: NURSE PRACTITIONER

## 2024-11-04 RX ORDER — TIRZEPATIDE 12.5 MG/.5ML
12.5 INJECTION, SOLUTION SUBCUTANEOUS WEEKLY
Qty: 6 ML | Refills: 0 | Status: SHIPPED | OUTPATIENT
Start: 2024-11-04

## 2024-11-04 RX ORDER — FLUOXETINE 40 MG/1
40 CAPSULE ORAL DAILY
Qty: 90 CAPSULE | Refills: 3 | Status: SHIPPED | OUTPATIENT
Start: 2024-11-04

## 2024-11-04 ASSESSMENT — ANXIETY QUESTIONNAIRES
GAD7 TOTAL SCORE: 7
1. FEELING NERVOUS, ANXIOUS, OR ON EDGE: NOT AT ALL
IF YOU CHECKED OFF ANY PROBLEMS ON THIS QUESTIONNAIRE, HOW DIFFICULT HAVE THESE PROBLEMS MADE IT FOR YOU TO DO YOUR WORK, TAKE CARE OF THINGS AT HOME, OR GET ALONG WITH OTHER PEOPLE: VERY DIFFICULT
7. FEELING AFRAID AS IF SOMETHING AWFUL MIGHT HAPPEN: SEVERAL DAYS
7. FEELING AFRAID AS IF SOMETHING AWFUL MIGHT HAPPEN: SEVERAL DAYS
3. WORRYING TOO MUCH ABOUT DIFFERENT THINGS: MORE THAN HALF THE DAYS
6. BECOMING EASILY ANNOYED OR IRRITABLE: NEARLY EVERY DAY
8. IF YOU CHECKED OFF ANY PROBLEMS, HOW DIFFICULT HAVE THESE MADE IT FOR YOU TO DO YOUR WORK, TAKE CARE OF THINGS AT HOME, OR GET ALONG WITH OTHER PEOPLE?: VERY DIFFICULT
4. TROUBLE RELAXING: NOT AT ALL
5. BEING SO RESTLESS THAT IT IS HARD TO SIT STILL: NOT AT ALL
2. NOT BEING ABLE TO STOP OR CONTROL WORRYING: SEVERAL DAYS

## 2024-11-04 ASSESSMENT — PATIENT HEALTH QUESTIONNAIRE - PHQ9
SUM OF ALL RESPONSES TO PHQ QUESTIONS 1-9: 7
SUM OF ALL RESPONSES TO PHQ QUESTIONS 1-9: 7
10. IF YOU CHECKED OFF ANY PROBLEMS, HOW DIFFICULT HAVE THESE PROBLEMS MADE IT FOR YOU TO DO YOUR WORK, TAKE CARE OF THINGS AT HOME, OR GET ALONG WITH OTHER PEOPLE: SOMEWHAT DIFFICULT

## 2024-11-04 ASSESSMENT — ENCOUNTER SYMPTOMS: FATIGUE: 1

## 2024-11-04 ASSESSMENT — PAIN SCALES - GENERAL: PAINLEVEL_OUTOF10: NO PAIN (0)

## 2024-11-04 NOTE — PROGRESS NOTES
Assessment & Plan     Chronic fatigue  Etiology of fatigue is unclear. Peculiar symptom pattern. Rule out thyroid dysfunction and vitamin D deficiency. Recent CBC was without evidence of anemia. We are also increasing her fluoxetine today (see below). If labs normal, would next consider sleep study to rule out SHANDRA. Recommend she start a symptom diary over the next 3 months in an attempt to track patterns.   - TSH with free T4 reflex  - Vitamin D Deficiency    Mild recurrent major depression (H)  AMANDA (generalized anxiety disorder)  Increased anxiety recently, which she suspects is the result of the election this week. Her depression seems to be stable. She is interested in increasing her fluoxetine. We will increase to 40 mg daily with follow-up as needed.   - FLUoxetine (PROZAC) 40 MG capsule; Take 1 capsule (40 mg) by mouth daily.    Type 2 diabetes mellitus without complication, without long-term current use of insulin (H)  Morbid obesity (H)  Requesting her prescription be sent to alternate pharmacy, rx resent.   - MOUNJARO 12.5 MG/0.5ML SOAJ; Inject 0.5 mLs (12.5 mg) subcutaneously once a week.    Paresthesia of arm  Suspect this is related to her posture. Recommend PT. 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     Mild intermittent asthma without complication  Chronic, stable. 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         Subjective   Geni is a 43 year old, presenting for the following health issues:  Fatigue and Back Pain (Upper back pain )        11/4/2024     2:07 PM   Additional Questions   Roomed by Gin LARRY MA   Accompanied by self         11/4/2024     2:07 PM   Patient Reported Additional Medications   Patient reports taking the following new medications none       Presents reporting chronic fatigue. This has been an issue for 13 years. Fatigue is described as episodic, typically  "one episode each year lasting 3-5 days, however lately this frequency has been increasing. She has had two episodes in the past three months resulting in 3 missed days of work each episode. During these episodes, she has body aches, sleeps a lot, no energy, trouble controlling her body temperature alternating between sweating and then freezing although no objective fevers, low appetite. Interestingly, during a couple of the episodes, she has had a concurrent non-healing sore that results in swollen lymph nodes and pain radiating into the nearest extremity. The past several episodes have not been associated with the sore. Reports she has had the sore tested at least twice for unknown things, results were \"fine.\" Ibuprofen is helpful for the body aches.      CBC 9/30/24: no anemia  Thyroid - last TSH check in 2021. Mother with history of what sounds like hyperthyroidism.   Takes vitamin D supplement, 2-3 capsules daily, she isn't sure on the dose.     SHANDRA - history of snoring but this has improved with weight loss. No known apnea.     Depression -she is having some issues with her depression and anxiety, she suspects this is related to the election. Off social media. Interested in increasing lexapro.     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     Upper arm paresthesias intermittent since losing weight. Finds that her breasts a pulling her upper back down which affects her posture. When she sits straight up, the paresthesias resolve.       Objective    BP (!) 144/88 (BP Location: Right arm, Patient Position: Sitting, Cuff Size: " "Adult Large)   Pulse 89   Temp 98  F (36.7  C) (Oral)   Resp 14   Ht 1.727 m (5' 8\")   Wt 107.6 kg (237 lb 3.2 oz)   LMP 10/14/2024   SpO2 100%   BMI 36.07 kg/m    Body mass index is 36.07 kg/m .  Physical Exam  Constitutional:       General: She is not in acute distress.     Appearance: Normal appearance. She is not ill-appearing or toxic-appearing.   Neck:      Thyroid: No thyroid mass, thyromegaly or thyroid tenderness.   Cardiovascular:      Rate and Rhythm: Normal rate and regular rhythm.      Heart sounds: Normal heart sounds. No murmur heard.     No friction rub. No gallop.   Pulmonary:      Effort: Pulmonary effort is normal. No respiratory distress.      Breath sounds: Normal breath sounds. No wheezing, rhonchi or rales.   Skin:     General: Skin is warm and dry.   Neurological:      General: No focal deficit present.      Mental Status: She is alert and oriented to person, place, and time.   Psychiatric:         Mood and Affect: Mood normal.         Behavior: Behavior normal.                    Signed Electronically by: CHUY Monge CNP    "

## 2024-11-06 ENCOUNTER — MYC REFILL (OUTPATIENT)
Dept: PEDIATRICS | Facility: CLINIC | Age: 44
End: 2024-11-06
Payer: COMMERCIAL

## 2024-11-06 DIAGNOSIS — F90.0 ATTENTION DEFICIT HYPERACTIVITY DISORDER (ADHD), PREDOMINANTLY INATTENTIVE TYPE: ICD-10-CM

## 2024-11-06 RX ORDER — DEXTROAMPHETAMINE SACCHARATE, AMPHETAMINE ASPARTATE, DEXTROAMPHETAMINE SULFATE AND AMPHETAMINE SULFATE 2.5; 2.5; 2.5; 2.5 MG/1; MG/1; MG/1; MG/1
TABLET ORAL
Qty: 30 TABLET | Refills: 0 | Status: SHIPPED | OUTPATIENT
Start: 2024-11-06

## 2024-11-19 ENCOUNTER — MYC REFILL (OUTPATIENT)
Dept: PEDIATRICS | Facility: CLINIC | Age: 44
End: 2024-11-19
Payer: COMMERCIAL

## 2024-11-19 DIAGNOSIS — F90.0 ATTENTION DEFICIT HYPERACTIVITY DISORDER (ADHD), PREDOMINANTLY INATTENTIVE TYPE: ICD-10-CM

## 2024-11-19 RX ORDER — DEXTROAMPHETAMINE SACCHARATE, AMPHETAMINE ASPARTATE, DEXTROAMPHETAMINE SULFATE AND AMPHETAMINE SULFATE 1.25; 1.25; 1.25; 1.25 MG/1; MG/1; MG/1; MG/1
5 TABLET ORAL
Qty: 30 TABLET | Refills: 0 | OUTPATIENT
Start: 2024-11-19

## 2024-11-20 RX ORDER — DEXTROAMPHETAMINE SACCHARATE, AMPHETAMINE ASPARTATE MONOHYDRATE, DEXTROAMPHETAMINE SULFATE AND AMPHETAMINE SULFATE 5; 5; 5; 5 MG/1; MG/1; MG/1; MG/1
20 CAPSULE, EXTENDED RELEASE ORAL DAILY
Qty: 30 CAPSULE | Refills: 0 | Status: SHIPPED | OUTPATIENT
Start: 2024-11-20

## 2024-11-25 ENCOUNTER — MYC REFILL (OUTPATIENT)
Dept: PEDIATRICS | Facility: CLINIC | Age: 44
End: 2024-11-25

## 2024-11-25 DIAGNOSIS — F90.0 ATTENTION DEFICIT HYPERACTIVITY DISORDER (ADHD), PREDOMINANTLY INATTENTIVE TYPE: ICD-10-CM

## 2024-11-25 RX ORDER — DEXTROAMPHETAMINE SACCHARATE, AMPHETAMINE ASPARTATE, DEXTROAMPHETAMINE SULFATE AND AMPHETAMINE SULFATE 1.25; 1.25; 1.25; 1.25 MG/1; MG/1; MG/1; MG/1
5 TABLET ORAL
Qty: 30 TABLET | Refills: 0 | Status: SHIPPED | OUTPATIENT
Start: 2024-11-25

## 2024-12-02 ENCOUNTER — MYC REFILL (OUTPATIENT)
Dept: PEDIATRICS | Facility: CLINIC | Age: 44
End: 2024-12-02
Payer: COMMERCIAL

## 2024-12-02 DIAGNOSIS — F90.0 ATTENTION DEFICIT HYPERACTIVITY DISORDER (ADHD), PREDOMINANTLY INATTENTIVE TYPE: ICD-10-CM

## 2024-12-02 RX ORDER — DEXTROAMPHETAMINE SACCHARATE, AMPHETAMINE ASPARTATE, DEXTROAMPHETAMINE SULFATE AND AMPHETAMINE SULFATE 2.5; 2.5; 2.5; 2.5 MG/1; MG/1; MG/1; MG/1
TABLET ORAL
Qty: 30 TABLET | Refills: 0 | Status: SHIPPED | OUTPATIENT
Start: 2024-12-02

## 2024-12-17 ENCOUNTER — TELEPHONE (OUTPATIENT)
Dept: PEDIATRICS | Facility: CLINIC | Age: 44
End: 2024-12-17
Payer: COMMERCIAL

## 2024-12-17 NOTE — TELEPHONE ENCOUNTER
Forms/Letter Request    Type of form/letter: OTHER: Plan of Care       Do we have the form/letter: Yes: Form is on the provider's desk for review      Who is the form from? [facility] (if other please explain)    Where did/will the form come from? form was faxed in

## 2024-12-18 ENCOUNTER — TRANSFERRED RECORDS (OUTPATIENT)
Dept: HEALTH INFORMATION MANAGEMENT | Facility: CLINIC | Age: 44
End: 2024-12-18

## 2025-01-18 ENCOUNTER — HEALTH MAINTENANCE LETTER (OUTPATIENT)
Age: 45
End: 2025-01-18

## 2025-01-20 ENCOUNTER — MYC REFILL (OUTPATIENT)
Dept: PEDIATRICS | Facility: CLINIC | Age: 45
End: 2025-01-20
Payer: COMMERCIAL

## 2025-01-20 ENCOUNTER — TRANSFERRED RECORDS (OUTPATIENT)
Dept: MULTI SPECIALTY CLINIC | Facility: CLINIC | Age: 45
End: 2025-01-20

## 2025-01-20 DIAGNOSIS — F90.0 ATTENTION DEFICIT HYPERACTIVITY DISORDER (ADHD), PREDOMINANTLY INATTENTIVE TYPE: ICD-10-CM

## 2025-01-20 DIAGNOSIS — E11.9 TYPE 2 DIABETES MELLITUS WITHOUT COMPLICATION, WITHOUT LONG-TERM CURRENT USE OF INSULIN (H): ICD-10-CM

## 2025-01-20 DIAGNOSIS — F90.0 ATTENTION DEFICIT HYPERACTIVITY DISORDER (ADHD), PREDOMINANTLY INATTENTIVE TYPE: Primary | ICD-10-CM

## 2025-01-20 DIAGNOSIS — E66.01 MORBID OBESITY (H): ICD-10-CM

## 2025-01-20 LAB — RETINOPATHY: NORMAL

## 2025-01-20 RX ORDER — DEXTROAMPHETAMINE SACCHARATE, AMPHETAMINE ASPARTATE MONOHYDRATE, DEXTROAMPHETAMINE SULFATE AND AMPHETAMINE SULFATE 5; 5; 5; 5 MG/1; MG/1; MG/1; MG/1
20 CAPSULE, EXTENDED RELEASE ORAL DAILY
Qty: 30 CAPSULE | Refills: 0 | Status: CANCELLED | OUTPATIENT
Start: 2025-01-20

## 2025-01-20 RX ORDER — TIRZEPATIDE 12.5 MG/.5ML
12.5 INJECTION, SOLUTION SUBCUTANEOUS WEEKLY
Qty: 6 ML | Refills: 0 | Status: CANCELLED | OUTPATIENT
Start: 2025-01-20

## 2025-01-20 RX ORDER — DEXTROAMPHETAMINE SACCHARATE, AMPHETAMINE ASPARTATE, DEXTROAMPHETAMINE SULFATE AND AMPHETAMINE SULFATE 2.5; 2.5; 2.5; 2.5 MG/1; MG/1; MG/1; MG/1
TABLET ORAL
Qty: 30 TABLET | Refills: 0 | Status: CANCELLED | OUTPATIENT
Start: 2025-01-20

## 2025-01-21 ENCOUNTER — MYC REFILL (OUTPATIENT)
Dept: PEDIATRICS | Facility: CLINIC | Age: 45
End: 2025-01-21
Payer: COMMERCIAL

## 2025-01-21 DIAGNOSIS — F90.0 ATTENTION DEFICIT HYPERACTIVITY DISORDER (ADHD), PREDOMINANTLY INATTENTIVE TYPE: ICD-10-CM

## 2025-01-21 RX ORDER — DEXTROAMPHETAMINE SACCHARATE, AMPHETAMINE ASPARTATE, DEXTROAMPHETAMINE SULFATE AND AMPHETAMINE SULFATE 2.5; 2.5; 2.5; 2.5 MG/1; MG/1; MG/1; MG/1
10 TABLET ORAL
Qty: 30 TABLET | Refills: 0 | Status: SHIPPED | OUTPATIENT
Start: 2025-02-20 | End: 2025-03-22

## 2025-01-21 RX ORDER — DEXTROAMPHETAMINE SACCHARATE, AMPHETAMINE ASPARTATE MONOHYDRATE, DEXTROAMPHETAMINE SULFATE AND AMPHETAMINE SULFATE 5; 5; 5; 5 MG/1; MG/1; MG/1; MG/1
20 CAPSULE, EXTENDED RELEASE ORAL DAILY
Qty: 30 CAPSULE | Refills: 0 | Status: SHIPPED | OUTPATIENT
Start: 2025-03-22 | End: 2025-04-21

## 2025-01-21 RX ORDER — DEXTROAMPHETAMINE SACCHARATE, AMPHETAMINE ASPARTATE MONOHYDRATE, DEXTROAMPHETAMINE SULFATE AND AMPHETAMINE SULFATE 5; 5; 5; 5 MG/1; MG/1; MG/1; MG/1
20 CAPSULE, EXTENDED RELEASE ORAL DAILY
Qty: 30 CAPSULE | Refills: 0 | Status: CANCELLED | OUTPATIENT
Start: 2025-01-21

## 2025-01-21 RX ORDER — DEXTROAMPHETAMINE SACCHARATE, AMPHETAMINE ASPARTATE, DEXTROAMPHETAMINE SULFATE AND AMPHETAMINE SULFATE 1.25; 1.25; 1.25; 1.25 MG/1; MG/1; MG/1; MG/1
5 TABLET ORAL
Qty: 30 TABLET | Refills: 0 | Status: SHIPPED | OUTPATIENT
Start: 2025-03-22 | End: 2025-04-21

## 2025-01-21 RX ORDER — DEXTROAMPHETAMINE SACCHARATE, AMPHETAMINE ASPARTATE, DEXTROAMPHETAMINE SULFATE AND AMPHETAMINE SULFATE 1.25; 1.25; 1.25; 1.25 MG/1; MG/1; MG/1; MG/1
5 TABLET ORAL
Qty: 30 TABLET | Refills: 0 | Status: SHIPPED | OUTPATIENT
Start: 2025-01-21 | End: 2025-02-20

## 2025-01-21 RX ORDER — TIRZEPATIDE 12.5 MG/.5ML
12.5 INJECTION, SOLUTION SUBCUTANEOUS WEEKLY
Qty: 6 ML | Refills: 1 | Status: SHIPPED | OUTPATIENT
Start: 2025-01-21

## 2025-01-21 RX ORDER — DEXTROAMPHETAMINE SACCHARATE, AMPHETAMINE ASPARTATE, DEXTROAMPHETAMINE SULFATE AND AMPHETAMINE SULFATE 2.5; 2.5; 2.5; 2.5 MG/1; MG/1; MG/1; MG/1
10 TABLET ORAL
Qty: 30 TABLET | Refills: 0 | Status: SHIPPED | OUTPATIENT
Start: 2025-01-21 | End: 2025-02-20

## 2025-01-21 RX ORDER — DEXTROAMPHETAMINE SACCHARATE, AMPHETAMINE ASPARTATE, DEXTROAMPHETAMINE SULFATE AND AMPHETAMINE SULFATE 2.5; 2.5; 2.5; 2.5 MG/1; MG/1; MG/1; MG/1
10 TABLET ORAL
Qty: 30 TABLET | Refills: 0 | Status: SHIPPED | OUTPATIENT
Start: 2025-03-22 | End: 2025-04-21

## 2025-01-21 RX ORDER — DEXTROAMPHETAMINE SACCHARATE, AMPHETAMINE ASPARTATE, DEXTROAMPHETAMINE SULFATE AND AMPHETAMINE SULFATE 1.25; 1.25; 1.25; 1.25 MG/1; MG/1; MG/1; MG/1
5 TABLET ORAL
Qty: 30 TABLET | Refills: 0 | Status: SHIPPED | OUTPATIENT
Start: 2025-02-20 | End: 2025-03-22

## 2025-01-21 RX ORDER — DEXTROAMPHETAMINE SACCHARATE, AMPHETAMINE ASPARTATE MONOHYDRATE, DEXTROAMPHETAMINE SULFATE AND AMPHETAMINE SULFATE 5; 5; 5; 5 MG/1; MG/1; MG/1; MG/1
20 CAPSULE, EXTENDED RELEASE ORAL DAILY
Qty: 30 CAPSULE | Refills: 0 | Status: SHIPPED | OUTPATIENT
Start: 2025-01-21 | End: 2025-02-20

## 2025-01-21 RX ORDER — DEXTROAMPHETAMINE SACCHARATE, AMPHETAMINE ASPARTATE MONOHYDRATE, DEXTROAMPHETAMINE SULFATE AND AMPHETAMINE SULFATE 5; 5; 5; 5 MG/1; MG/1; MG/1; MG/1
20 CAPSULE, EXTENDED RELEASE ORAL DAILY
Qty: 30 CAPSULE | Refills: 0 | Status: SHIPPED | OUTPATIENT
Start: 2025-02-20 | End: 2025-03-22

## 2025-01-27 ENCOUNTER — MYC REFILL (OUTPATIENT)
Dept: PEDIATRICS | Facility: CLINIC | Age: 45
End: 2025-01-27
Payer: COMMERCIAL

## 2025-01-27 DIAGNOSIS — F90.0 ATTENTION DEFICIT HYPERACTIVITY DISORDER (ADHD), PREDOMINANTLY INATTENTIVE TYPE: ICD-10-CM

## 2025-01-27 RX ORDER — DEXTROAMPHETAMINE SACCHARATE, AMPHETAMINE ASPARTATE MONOHYDRATE, DEXTROAMPHETAMINE SULFATE AND AMPHETAMINE SULFATE 5; 5; 5; 5 MG/1; MG/1; MG/1; MG/1
20 CAPSULE, EXTENDED RELEASE ORAL DAILY
Qty: 30 CAPSULE | Refills: 0 | OUTPATIENT
Start: 2025-01-27

## 2025-02-06 ENCOUNTER — MYC REFILL (OUTPATIENT)
Dept: PEDIATRICS | Facility: CLINIC | Age: 45
End: 2025-02-06
Payer: COMMERCIAL

## 2025-02-06 DIAGNOSIS — J45.20 MILD INTERMITTENT ASTHMA WITHOUT COMPLICATION: ICD-10-CM

## 2025-02-06 RX ORDER — ALBUTEROL SULFATE 90 UG/1
2 INHALANT RESPIRATORY (INHALATION) EVERY 6 HOURS
Qty: 18 G | Refills: 1 | Status: SHIPPED | OUTPATIENT
Start: 2025-02-06

## 2025-02-08 DIAGNOSIS — J45.20 MILD INTERMITTENT ASTHMA WITHOUT COMPLICATION: ICD-10-CM

## 2025-02-10 RX ORDER — ALBUTEROL SULFATE 90 UG/1
2 INHALANT RESPIRATORY (INHALATION) EVERY 6 HOURS
Qty: 8.5 G | OUTPATIENT
Start: 2025-02-10

## 2025-02-25 ENCOUNTER — OFFICE VISIT (OUTPATIENT)
Dept: OBGYN | Facility: CLINIC | Age: 45
End: 2025-02-25
Payer: COMMERCIAL

## 2025-02-25 VITALS
HEIGHT: 68 IN | DIASTOLIC BLOOD PRESSURE: 82 MMHG | SYSTOLIC BLOOD PRESSURE: 122 MMHG | WEIGHT: 218 LBS | BODY MASS INDEX: 33.04 KG/M2

## 2025-02-25 DIAGNOSIS — N83.201 RIGHT OVARIAN CYST: Primary | ICD-10-CM

## 2025-02-25 DIAGNOSIS — Z31.69 ENCOUNTER FOR PRECONCEPTION CONSULTATION: ICD-10-CM

## 2025-02-25 PROCEDURE — G2211 COMPLEX E/M VISIT ADD ON: HCPCS | Performed by: OBSTETRICS & GYNECOLOGY

## 2025-02-25 PROCEDURE — 3079F DIAST BP 80-89 MM HG: CPT | Performed by: OBSTETRICS & GYNECOLOGY

## 2025-02-25 PROCEDURE — 99214 OFFICE O/P EST MOD 30 MIN: CPT | Performed by: OBSTETRICS & GYNECOLOGY

## 2025-02-25 PROCEDURE — 3074F SYST BP LT 130 MM HG: CPT | Performed by: OBSTETRICS & GYNECOLOGY

## 2025-02-25 NOTE — PATIENT INSTRUCTIONS
In order to further evaluate for infertility, you will need the following tests: Keep these instructions so you are able to inform the lab of which tests you are needing at each lab visit.     Cycle Day 1 is the first day of your period.  Count each day forward to calculate which Cycle Day you are on.    --pelvic ultrasound can be scheduled at any time     -- on cycle day 6, 7, 8 or 9, you need to have a Hysterosalpingogram (HSG); this is a test done to check your uterus and tubes.  Call to schedule on the first day of your period.      -- your partner needs to obtain and submit a semen sample for Semen Analysis (best if 3 days of no ejaculation prior, and keep the specimen warm until it arrives at the specialized lab), we will be giving you a separate request for this test     [facility] - Dr Arana, Dr Almanzar  [email or web address]

## 2025-02-25 NOTE — NURSING NOTE
"Chief Complaint   Patient presents with    Fertility     Trying to conceive for about a year       Initial /82 (BP Location: Right arm, Patient Position: Chair, Cuff Size: Adult Large)   Ht 1.727 m (5' 8\")   Wt 98.9 kg (218 lb)   LMP 2025 (Exact Date)   BMI 33.15 kg/m   Estimated body mass index is 33.15 kg/m  as calculated from the following:    Height as of this encounter: 1.727 m (5' 8\").    Weight as of this encounter: 98.9 kg (218 lb).  BP completed using cuff size: large    Questioned patient about current smoking habits.  Pt. has never smoked.          The following HM Due: NONE  Claudia Zhu CMA             "

## 2025-02-26 NOTE — PROGRESS NOTES
"  SUBJECTIVE:                                                   CC:  Patient presents with:  Fertility: Trying to conceive for about a year      HPI:  Ambar Jin is a 44 year old  with infertility who presents for family planning.  See prior note 24 for additional details.     Since her last visit she did pre-parent screening which was normal  She had an 8cm complex anechoic cystic lesion on US in 10/23 and .  It was repeated and was normal last fall.  She feels a strange tugging/pressure sensation again and wonders if the ovarian cyst is back.     Has been doing monjuaro, losing weight  Partner Helio agreeable to doing a semen analysis.     Periods are regular, she just ovulated today on CD 12, according to her OPKs.  Has PMDD, takes sertraline.  Has ADHD, takes adderall.  Understands she should stop adderall and monjuaro when TTC/pregnant.      Overdue for pap smear    Gyn History:  Patient's last menstrual period was 2025 (exact date).        Last 3 Pap and HPV Results:       Latest Ref Rng & Units 2020    11:59 AM   PAP / HPV   PAP Negative for squamous intraepithelial lesion or malignancy. Negative for squamous intraepithelial lesion or malignancy  Electronically signed by Mercy Christopher CT (ASCP) on 2020 at 10:05 AM      HPV 16 DNA NEG Negative    HPV 18 DNA NEG Negative    Other HR HPV NEG Negative        PMH, PSH, Soc Hx, Fam Hx, Meds, and allergies reviewed in Epic.    OBJECTIVE:     /82 (BP Location: Right arm, Patient Position: Chair, Cuff Size: Adult Large)   Ht 1.727 m (5' 8\")   Wt 98.9 kg (218 lb)   LMP 2025 (Exact Date)   BMI 33.15 kg/m      Gen: Healthy appearing female, no acute distress, comfortable  Psych: mentation appears normal and affect bright  : deferred    Test Results:  EXAM: US PELVIC TRANSABDOMINAL AND TRANSVAGINAL  LOCATION: [facility]  DATE: 2024     INDICATION:  Right ovarian " cyst  COMPARISON: 11/17/2023  TECHNIQUE: Transabdominal scans were performed. Endovaginal ultrasound was performed to better visualize the adnexa.     FINDINGS:     UTERUS: 8.1 x 5.3 x 4.4 cm. Normal in size and position with no masses. Nabothian cysts.     ENDOMETRIUM: 12 mm. Normal smooth endometrium.     RIGHT OVARY: 3.6 x 2.4 x 2.5 cm. Normal. Complex right ovarian cyst described on prior ultrasound has resolved.     LEFT OVARY: 3.7 x 2.9 x 2.3 cm. Normal.     No significant free fluid.                                                                      IMPRESSION:  Normal pelvic ultrasound. Complex right ovarian cyst described on prior ultrasound has resolved.          ASSESSMENT/PLAN:                                                      1. Right ovarian cyst (Primary)  Reviewed images from 6/24.  She feels some of the pressure back, would like to be sure the ovarian cyst is still gone.    - XR Hysterosalpingogram; Future  - US Pelvic Transabdominal and Transvaginal; Future    2. Encounter for preconception consultation, infertility  Extensive discussion of physiology of pregnancy and different factors for infertility.  She is mod risk of ovulation factor, mod risk male factor, low risk tubal factor, low risk uterine factor.  Discussed that infertility of unknown etiology can be treated here with ovulation induction, or referral to infertility specialist.  She elects for doing HSG and SA, and referral to [facility] at this time.  Also discussed stopping monjuaro and adderall when TTC.    Also reviewed presenting back to our office once pregnancy is achieved in order to manage pregnancy.  Is on PNV.  Is due for pap smear.   - XR Hysterosalpingogram; Future  - US Pelvic Transabdominal and Transvaginal; Future    Patient Instructions   In order to further evaluate for infertility, you will need the following tests: Keep these instructions so you are able to inform the lab of which tests you are needing at each lab  visit.     Cycle Day 1 is the first day of your period.  Count each day forward to calculate which Cycle Day you are on.    --pelvic ultrasound can be scheduled at any time     -- on cycle day 6, 7, 8 or 9, you need to have a Hysterosalpingogram (HSG); this is a test done to check your uterus and tubes.  Call to schedule on the first day of your period.      -- your partner needs to obtain and submit a semen sample for Semen Analysis (best if 3 days of no ejaculation prior, and keep the specimen warm until it arrives at the specialized lab), we will be giving you a separate request for this test     [email or web address] - Dr Arana, Dr Almanzar  [email or web address]     Kassidy Lujan MD, MPH  Obstetrics and Gynecology

## 2025-03-06 ENCOUNTER — MYC REFILL (OUTPATIENT)
Dept: PEDIATRICS | Facility: CLINIC | Age: 45
End: 2025-03-06
Payer: COMMERCIAL

## 2025-03-06 DIAGNOSIS — F90.0 ATTENTION DEFICIT HYPERACTIVITY DISORDER (ADHD), PREDOMINANTLY INATTENTIVE TYPE: ICD-10-CM

## 2025-03-06 DIAGNOSIS — E11.9 TYPE 2 DIABETES MELLITUS WITHOUT COMPLICATION, WITHOUT LONG-TERM CURRENT USE OF INSULIN (H): ICD-10-CM

## 2025-03-06 RX ORDER — DEXTROAMPHETAMINE SACCHARATE, AMPHETAMINE ASPARTATE, DEXTROAMPHETAMINE SULFATE AND AMPHETAMINE SULFATE 1.25; 1.25; 1.25; 1.25 MG/1; MG/1; MG/1; MG/1
5 TABLET ORAL
Qty: 30 TABLET | Refills: 0 | OUTPATIENT
Start: 2025-03-06

## 2025-03-06 RX ORDER — DEXTROAMPHETAMINE SACCHARATE, AMPHETAMINE ASPARTATE MONOHYDRATE, DEXTROAMPHETAMINE SULFATE AND AMPHETAMINE SULFATE 5; 5; 5; 5 MG/1; MG/1; MG/1; MG/1
20 CAPSULE, EXTENDED RELEASE ORAL DAILY
Qty: 30 CAPSULE | Refills: 0 | OUTPATIENT
Start: 2025-03-06

## 2025-03-06 RX ORDER — DEXTROAMPHETAMINE SACCHARATE, AMPHETAMINE ASPARTATE, DEXTROAMPHETAMINE SULFATE AND AMPHETAMINE SULFATE 2.5; 2.5; 2.5; 2.5 MG/1; MG/1; MG/1; MG/1
10 TABLET ORAL
Qty: 30 TABLET | Refills: 0 | OUTPATIENT
Start: 2025-03-06

## 2025-03-06 RX ORDER — METFORMIN HYDROCHLORIDE 500 MG/1
2000 TABLET, EXTENDED RELEASE ORAL
Qty: 360 TABLET | Refills: 0 | Status: SHIPPED | OUTPATIENT
Start: 2025-03-06

## 2025-03-06 NOTE — LETTER
March 11, 2025      Ambar Jin  [address] [place] [address]        Dear Ambar,       We care about your health and have reviewed your health plan including your medical conditions, medications, and lab results.  Based on this review, it is recommended that you follow up regarding the following health topic(s):  -Diabetes    We recommend you take the following action(s):  -schedule a FOLLOWUP APPOINTMENT.     Please call us at the [facility] - [phone] (or use [facility]) to address the above recommendations.     Thank you for trusting [facility] and we appreciate the opportunity to serve you.  We look forward to supporting your healthcare needs in the future.    Healthy Regards,    Your Health Care Team  [facility]

## 2025-03-06 NOTE — TELEPHONE ENCOUNTER
Lab Results   Component Value Date    A1C 5.3 06/14/2024    A1C 5.2 12/01/2023    A1C 7.5 05/08/2023    A1C 6.5 02/01/2023    A1C 6.5 09/30/2022     Due for f/up - please schedule wellness w/ Ira. If > 3 mo out can schedule T2DM,  f/up with [facility] now and then wellness with HB 3 months later.

## 2025-04-17 ENCOUNTER — MYC REFILL (OUTPATIENT)
Dept: PEDIATRICS | Facility: CLINIC | Age: 45
End: 2025-04-17
Payer: COMMERCIAL

## 2025-04-17 DIAGNOSIS — F90.0 ATTENTION DEFICIT HYPERACTIVITY DISORDER (ADHD), PREDOMINANTLY INATTENTIVE TYPE: ICD-10-CM

## 2025-04-21 RX ORDER — DEXTROAMPHETAMINE SACCHARATE, AMPHETAMINE ASPARTATE, DEXTROAMPHETAMINE SULFATE AND AMPHETAMINE SULFATE 1.25; 1.25; 1.25; 1.25 MG/1; MG/1; MG/1; MG/1
5 TABLET ORAL
Qty: 30 TABLET | Refills: 0 | Status: SHIPPED | OUTPATIENT
Start: 2025-04-21

## 2025-04-21 RX ORDER — DEXTROAMPHETAMINE SACCHARATE, AMPHETAMINE ASPARTATE MONOHYDRATE, DEXTROAMPHETAMINE SULFATE AND AMPHETAMINE SULFATE 5; 5; 5; 5 MG/1; MG/1; MG/1; MG/1
20 CAPSULE, EXTENDED RELEASE ORAL DAILY
Qty: 30 CAPSULE | Refills: 0 | Status: SHIPPED | OUTPATIENT
Start: 2025-04-21

## 2025-04-21 RX ORDER — DEXTROAMPHETAMINE SACCHARATE, AMPHETAMINE ASPARTATE, DEXTROAMPHETAMINE SULFATE AND AMPHETAMINE SULFATE 2.5; 2.5; 2.5; 2.5 MG/1; MG/1; MG/1; MG/1
10 TABLET ORAL
Qty: 30 TABLET | Refills: 0 | Status: SHIPPED | OUTPATIENT
Start: 2025-04-21

## 2025-04-27 ENCOUNTER — HEALTH MAINTENANCE LETTER (OUTPATIENT)
Age: 45
End: 2025-04-27

## 2025-05-01 ASSESSMENT — ASTHMA QUESTIONNAIRES
ACT_TOTALSCORE: 25
QUESTION_5 LAST FOUR WEEKS HOW WOULD YOU RATE YOUR ASTHMA CONTROL: COMPLETELY CONTROLLED
QUESTION_3 LAST FOUR WEEKS HOW OFTEN DID YOUR ASTHMA SYMPTOMS (WHEEZING, COUGHING, SHORTNESS OF BREATH, CHEST TIGHTNESS OR PAIN) WAKE YOU UP AT NIGHT OR EARLIER THAN USUAL IN THE MORNING: NOT AT ALL
QUESTION_1 LAST FOUR WEEKS HOW MUCH OF THE TIME DID YOUR ASTHMA KEEP YOU FROM GETTING AS MUCH DONE AT WORK, SCHOOL OR AT HOME: NONE OF THE TIME
QUESTION_2 LAST FOUR WEEKS HOW OFTEN HAVE YOU HAD SHORTNESS OF BREATH: NOT AT ALL
QUESTION_4 LAST FOUR WEEKS HOW OFTEN HAVE YOU USED YOUR RESCUE INHALER OR NEBULIZER MEDICATION (SUCH AS ALBUTEROL): NOT AT ALL

## 2025-05-05 ENCOUNTER — MYC MEDICAL ADVICE (OUTPATIENT)
Dept: PEDIATRICS | Facility: CLINIC | Age: 45
End: 2025-05-05

## 2025-05-05 ENCOUNTER — OFFICE VISIT (OUTPATIENT)
Dept: FAMILY MEDICINE | Facility: CLINIC | Age: 45
End: 2025-05-05
Payer: COMMERCIAL

## 2025-05-05 VITALS
RESPIRATION RATE: 21 BRPM | WEIGHT: 216 LBS | HEART RATE: 88 BPM | DIASTOLIC BLOOD PRESSURE: 88 MMHG | BODY MASS INDEX: 32.84 KG/M2 | OXYGEN SATURATION: 99 % | TEMPERATURE: 97.8 F | SYSTOLIC BLOOD PRESSURE: 139 MMHG

## 2025-05-05 DIAGNOSIS — G89.29 CHRONIC RIGHT-SIDED LOW BACK PAIN WITH RIGHT-SIDED SCIATICA: Primary | ICD-10-CM

## 2025-05-05 DIAGNOSIS — E66.01 MORBID OBESITY (H): ICD-10-CM

## 2025-05-05 DIAGNOSIS — M54.41 CHRONIC RIGHT-SIDED LOW BACK PAIN WITH RIGHT-SIDED SCIATICA: Primary | ICD-10-CM

## 2025-05-05 DIAGNOSIS — M21.371 RIGHT FOOT DROP: ICD-10-CM

## 2025-05-05 DIAGNOSIS — E11.9 TYPE 2 DIABETES MELLITUS WITHOUT COMPLICATION, WITHOUT LONG-TERM CURRENT USE OF INSULIN (H): ICD-10-CM

## 2025-05-05 PROCEDURE — 99214 OFFICE O/P EST MOD 30 MIN: CPT

## 2025-05-05 PROCEDURE — 3079F DIAST BP 80-89 MM HG: CPT

## 2025-05-05 PROCEDURE — 3075F SYST BP GE 130 - 139MM HG: CPT

## 2025-05-05 RX ORDER — TIRZEPATIDE 12.5 MG/.5ML
12.5 INJECTION, SOLUTION SUBCUTANEOUS WEEKLY
Qty: 6 ML | Refills: 0 | Status: SHIPPED | OUTPATIENT
Start: 2025-05-05

## 2025-05-05 RX ORDER — TIRZEPATIDE 12.5 MG/.5ML
12.5 INJECTION, SOLUTION SUBCUTANEOUS WEEKLY
Qty: 6 ML | Refills: 1 | Status: CANCELLED | OUTPATIENT
Start: 2025-05-05

## 2025-05-05 ASSESSMENT — PATIENT HEALTH QUESTIONNAIRE - PHQ9
SUM OF ALL RESPONSES TO PHQ QUESTIONS 1-9: 10
SUM OF ALL RESPONSES TO PHQ QUESTIONS 1-9: 10
10. IF YOU CHECKED OFF ANY PROBLEMS, HOW DIFFICULT HAVE THESE PROBLEMS MADE IT FOR YOU TO DO YOUR WORK, TAKE CARE OF THINGS AT HOME, OR GET ALONG WITH OTHER PEOPLE: SOMEWHAT DIFFICULT

## 2025-05-05 NOTE — TELEPHONE ENCOUNTER
Please see patient MyChart message  -states has new insurance and needs PA for mounjaro  -[facility] uses [facility]    Per chart review  MOUNJARO 12.5 MG/0.5ML SOAJ 6 mL 1 1/21/2025 -- Yes   Sig - Route: Inject 0.5 mLs (12.5 mg) subcutaneously once a week. - Subcutaneous     Ira Marcus please review and advise. Want visit for update on mounjaro or agreeable to resend?    Piper Styles RN

## 2025-05-05 NOTE — PROGRESS NOTES
Assessment & Plan     Chronic right-sided low back pain with right-sided sciatica  Spine referral ordered as below to discuss advanced imaging, further management for chronic sciatica and subsequent foot drop on right side.   - Spine  Referral    Right foot drop  I do suspect her foot drop is related to her sciatica- lumbar radiculopathy given lack of other neurological symptoms. Neurology referral was provided for EMG testing.   Recommend PT, orthotic inserts, ibuprofen as needed.  Avoid crossing legs. Continue gentle stretching at home.  ER eval if experiencing other neurological symptoms-- slurred speech, sudden difficulty walking, entire right sided weakness, severe headaches, neck pain.  - Physical Therapy  Referral  - Orthotics, Mastectomy and Custom Compression Orders Type: Orthotic; Orthotic Type Requested: Foot Orthotics; Foot Orthotics Laterality: Bilateral  - Adult Neurology  Referral    32 minutes spent on the date of the encounter doing chart review, history and exam, documentation and further activities per the note.       Sushila Arechiga is a 44 year old, presenting for the following health issues:  Back Pain (Sciatica pain started last Wednesday) and Foot Pain (Right foot pain & numbness)        11/4/2024     2:07 PM   Additional Questions   Roomed by Gin LARRY MA   Accompanied by self       Via the Health Maintenance questionnaire, the patient has reported the following services have been completed -Eye Exam: [facility] 2025-01-20, this information has been sent to the abstraction team.  History of Present Illness       Back Pain:  She presents for follow up of back pain. Patient's back pain is a chronic problem.  Location of back pain:  Right lower back, right buttock and right hip  Description of back pain: shooting  Back pain spreads: right buttocks, right thigh and right foot    Since patient first noticed back pain, pain is: rapidly worsening  Does back pain  interfere with her job:  Yes      She is taking medications regularly.    Last week at work thought right foot was falling asleep but it never came back  She now thinks she has drop foot   Unable to walk normally without picking her leg up    Also having right sided buttock pain and numbness/weakness radiating into right hip and thigh  She has done PT for her sciatica before   Interested in potential surgical options as this is a longstanding problem    Has not needed any ibuprofen/tylenol, not necessarily painful- more uncomfortable/ odd sensation     Tried some stretching exercises at home.     No other neurological symptoms- no ataxia, incoordination, slurred speech, headaches etc.       Review of Systems  Constitutional, HEENT, cardiovascular, pulmonary, gi and gu systems are negative, except as otherwise noted.      Objective    /88 (BP Location: Right arm, Patient Position: Chair, Cuff Size: Adult Large)   Pulse 88   Temp 97.8  F (36.6  C) (Oral)   Resp 21   Wt 98 kg (216 lb)   LMP 04/14/2025 (Approximate)   SpO2 99%   BMI 32.84 kg/m    Body mass index is 32.84 kg/m .  Physical Exam   GENERAL: alert and no distress  MS: right straight leg test pain around 90 degrees. Right foot drop causing abnormal gait. Flexion of right foot markedly limited.  NEURO: weakness of right lower extremity, numbness of right forefoot.  PSYCH: mentation appears normal, affect normal/bright            Signed Electronically by: CHUY Hubbard CNP

## 2025-05-06 ENCOUNTER — PATIENT OUTREACH (OUTPATIENT)
Dept: CARE COORDINATION | Facility: CLINIC | Age: 45
End: 2025-05-06
Payer: COMMERCIAL

## 2025-05-06 ENCOUNTER — TELEPHONE (OUTPATIENT)
Dept: FAMILY MEDICINE | Facility: CLINIC | Age: 45
End: 2025-05-06
Payer: COMMERCIAL

## 2025-05-06 NOTE — TELEPHONE ENCOUNTER
See previous mychart, patient requests PA for mounjaro due to new insurance.     MOUNJARO 12.5 MG/0.5ML SOAJ auto-injector pen Inject 0.5 mLs (12.5 mg) subcutaneously once a week. 6 mL 0 ordered 05/05/2025      Routing to PA pool to process. Patient requests prior auth form sent via [employer], please advise.     SILVIA BoucherN, RN  [facility]

## 2025-05-06 NOTE — TELEPHONE ENCOUNTER
See GERALDO chan, see telephone encounter 5/6/25.    LAWSON Boucher, RN  [facility]

## 2025-05-07 NOTE — TELEPHONE ENCOUNTER
Please process as high priority, med for diabetes and patient is currently out of medication     Thank you   Sindy Holliday, Registered Nurse  [facility]

## 2025-05-07 NOTE — TELEPHONE ENCOUNTER
See my chart     PA already started in separate encounter per chart review still in process     Sindy Holliday, Registered Nurse  [facility]

## 2025-05-08 ENCOUNTER — PATIENT OUTREACH (OUTPATIENT)
Dept: CARE COORDINATION | Facility: CLINIC | Age: 45
End: 2025-05-08
Payer: COMMERCIAL

## 2025-05-08 NOTE — TELEPHONE ENCOUNTER
PA Initiation    Medication: MOUNJARO 12.5 MG/0.5ML SC SOAJ  Insurance Company: Other (see comments)Comment:  [employer] - Phone [phone]  Pharmacy Filling the Rx: [facility] - [place], [place] - [address] [address]  Filling Pharmacy Phone: [phone]  Filling Pharmacy Fax: [phone]  Start Date: 5/8/2025

## 2025-05-09 NOTE — TELEPHONE ENCOUNTER
Spoke with plan, they confirmed they have received request. Plan has 4 business days to make determination. Patient advised and acknowledged.

## 2025-05-13 NOTE — TELEPHONE ENCOUNTER
Spoke with plan, they advised that it was approved but not finalized in the system so it will not process yet. Plan will fax letter once completed.

## 2025-05-13 NOTE — TELEPHONE ENCOUNTER
Prior Authorization Approval    Medication: MOUNJARO 12.5 MG/0.5ML SC SOAJ  Authorization Effective Date: 5/13/2025  Authorization Expiration Date:    Approved Dose/Quantity: as written  Reference #: [id number]   Insurance Company: Other (see comments)Comment:  [employer] - Phone [phone]  Which Pharmacy is filling the prescription: [facility] - [place] - [address]  Pharmacy Notified: y  Patient Notified: Instructed pharmacy to notify patient once order is ready.

## 2025-06-04 ENCOUNTER — E-VISIT (OUTPATIENT)
Dept: PEDIATRICS | Facility: CLINIC | Age: 45
End: 2025-06-04
Payer: COMMERCIAL

## 2025-06-04 DIAGNOSIS — F41.1 GAD (GENERALIZED ANXIETY DISORDER): ICD-10-CM

## 2025-06-04 DIAGNOSIS — F33.0 MILD RECURRENT MAJOR DEPRESSION: Primary | ICD-10-CM

## 2025-06-04 ASSESSMENT — ANXIETY QUESTIONNAIRES
GAD7 TOTAL SCORE: 5
1. FEELING NERVOUS, ANXIOUS, OR ON EDGE: SEVERAL DAYS
GAD7 TOTAL SCORE: 5
2. NOT BEING ABLE TO STOP OR CONTROL WORRYING: NOT AT ALL
8. IF YOU CHECKED OFF ANY PROBLEMS, HOW DIFFICULT HAVE THESE MADE IT FOR YOU TO DO YOUR WORK, TAKE CARE OF THINGS AT HOME, OR GET ALONG WITH OTHER PEOPLE?: VERY DIFFICULT
6. BECOMING EASILY ANNOYED OR IRRITABLE: NEARLY EVERY DAY
GAD7 TOTAL SCORE: 5
5. BEING SO RESTLESS THAT IT IS HARD TO SIT STILL: NOT AT ALL
3. WORRYING TOO MUCH ABOUT DIFFERENT THINGS: SEVERAL DAYS
4. TROUBLE RELAXING: NOT AT ALL
IF YOU CHECKED OFF ANY PROBLEMS ON THIS QUESTIONNAIRE, HOW DIFFICULT HAVE THESE PROBLEMS MADE IT FOR YOU TO DO YOUR WORK, TAKE CARE OF THINGS AT HOME, OR GET ALONG WITH OTHER PEOPLE: VERY DIFFICULT
7. FEELING AFRAID AS IF SOMETHING AWFUL MIGHT HAPPEN: NOT AT ALL
7. FEELING AFRAID AS IF SOMETHING AWFUL MIGHT HAPPEN: NOT AT ALL

## 2025-06-04 ASSESSMENT — PATIENT HEALTH QUESTIONNAIRE - PHQ9
SUM OF ALL RESPONSES TO PHQ QUESTIONS 1-9: 15
SUM OF ALL RESPONSES TO PHQ QUESTIONS 1-9: 15
10. IF YOU CHECKED OFF ANY PROBLEMS, HOW DIFFICULT HAVE THESE PROBLEMS MADE IT FOR YOU TO DO YOUR WORK, TAKE CARE OF THINGS AT HOME, OR GET ALONG WITH OTHER PEOPLE: SOMEWHAT DIFFICULT

## 2025-06-04 NOTE — TELEPHONE ENCOUNTER
Provider E-Visit time total (minutes): 6 minutes            11/4/2024     9:45 AM 5/5/2025     7:59 AM 6/4/2025     2:48 PM   PHQ   PHQ-9 Total Score 7  10  15    Q9: Thoughts of better off dead/self-harm past 2 weeks Not at all Not at all Not at all       Patient-reported

## 2025-06-05 ENCOUNTER — MYC MEDICAL ADVICE (OUTPATIENT)
Dept: PEDIATRICS | Facility: CLINIC | Age: 45
End: 2025-06-05
Payer: COMMERCIAL

## 2025-06-05 NOTE — PATIENT INSTRUCTIONS
Thank you for checking in. I have adjusted your medication to manage your symptoms. The following medication was sent to your pharmacy:    Orders Placed This Encounter   Medications     FLUoxetine (PROZAC) 20 MG capsule     Sig: Take one capsule (20 mg) in combination with 40 mg capsule for a total daily dose of 60 mg.     Dispense:  90 capsule     Refill:  0        View your full visit summary for details by clicking on the link below. Your pharmacist will be able to address any questions you may have about the medication.       Please send an eVisit to follow up on this medication change in 4-6 weeks, or sooner if needed.     Thank you for choosing us for your care.

## 2025-06-19 ENCOUNTER — VIRTUAL VISIT (OUTPATIENT)
Dept: PEDIATRICS | Facility: CLINIC | Age: 45
End: 2025-06-19
Payer: COMMERCIAL

## 2025-06-19 DIAGNOSIS — E11.9 TYPE 2 DIABETES MELLITUS WITHOUT COMPLICATION, WITHOUT LONG-TERM CURRENT USE OF INSULIN (H): ICD-10-CM

## 2025-06-19 DIAGNOSIS — F90.0 ATTENTION DEFICIT HYPERACTIVITY DISORDER (ADHD), PREDOMINANTLY INATTENTIVE TYPE: ICD-10-CM

## 2025-06-19 DIAGNOSIS — F33.0 MILD RECURRENT MAJOR DEPRESSION: Primary | ICD-10-CM

## 2025-06-19 DIAGNOSIS — E66.01 MORBID OBESITY (H): ICD-10-CM

## 2025-06-19 DIAGNOSIS — F41.1 GAD (GENERALIZED ANXIETY DISORDER): ICD-10-CM

## 2025-06-19 RX ORDER — DEXTROAMPHETAMINE SACCHARATE, AMPHETAMINE ASPARTATE MONOHYDRATE, DEXTROAMPHETAMINE SULFATE AND AMPHETAMINE SULFATE 5; 5; 5; 5 MG/1; MG/1; MG/1; MG/1
20 CAPSULE, EXTENDED RELEASE ORAL DAILY
Qty: 30 CAPSULE | Refills: 0 | Status: SHIPPED | OUTPATIENT
Start: 2025-07-19 | End: 2025-08-18

## 2025-06-19 RX ORDER — DEXTROAMPHETAMINE SACCHARATE, AMPHETAMINE ASPARTATE MONOHYDRATE, DEXTROAMPHETAMINE SULFATE AND AMPHETAMINE SULFATE 5; 5; 5; 5 MG/1; MG/1; MG/1; MG/1
20 CAPSULE, EXTENDED RELEASE ORAL DAILY
Qty: 30 CAPSULE | Refills: 0 | Status: SHIPPED | OUTPATIENT
Start: 2025-08-18 | End: 2025-09-17

## 2025-06-19 RX ORDER — DEXTROAMPHETAMINE SACCHARATE, AMPHETAMINE ASPARTATE, DEXTROAMPHETAMINE SULFATE AND AMPHETAMINE SULFATE 3.75; 3.75; 3.75; 3.75 MG/1; MG/1; MG/1; MG/1
15 TABLET ORAL
Qty: 30 TABLET | Refills: 0 | Status: SHIPPED | OUTPATIENT
Start: 2025-06-19 | End: 2025-07-19

## 2025-06-19 RX ORDER — DEXTROAMPHETAMINE SACCHARATE, AMPHETAMINE ASPARTATE MONOHYDRATE, DEXTROAMPHETAMINE SULFATE AND AMPHETAMINE SULFATE 5; 5; 5; 5 MG/1; MG/1; MG/1; MG/1
20 CAPSULE, EXTENDED RELEASE ORAL DAILY
Qty: 30 CAPSULE | Refills: 0 | Status: SHIPPED | OUTPATIENT
Start: 2025-06-19 | End: 2025-07-19

## 2025-06-19 RX ORDER — DEXTROAMPHETAMINE SACCHARATE, AMPHETAMINE ASPARTATE, DEXTROAMPHETAMINE SULFATE AND AMPHETAMINE SULFATE 3.75; 3.75; 3.75; 3.75 MG/1; MG/1; MG/1; MG/1
15 TABLET ORAL
Qty: 30 TABLET | Refills: 0 | Status: SHIPPED | OUTPATIENT
Start: 2025-08-18 | End: 2025-09-17

## 2025-06-19 RX ORDER — DEXTROAMPHETAMINE SACCHARATE, AMPHETAMINE ASPARTATE, DEXTROAMPHETAMINE SULFATE AND AMPHETAMINE SULFATE 3.75; 3.75; 3.75; 3.75 MG/1; MG/1; MG/1; MG/1
15 TABLET ORAL
Qty: 30 TABLET | Refills: 0 | Status: SHIPPED | OUTPATIENT
Start: 2025-07-19 | End: 2025-08-18

## 2025-06-19 NOTE — PROGRESS NOTES
"Geni is a 44 year old who is being evaluated via a billable video visit.          Assessment & Plan     Mild recurrent major depression  AMANDA (generalized anxiety disorder)  Improving. Continue fluoxetine 60 mg daily. Follow-up in about 2 months at time of routine physical.     Attention deficit hyperactivity disorder (ADHD), predominantly inattentive type  Chronic, stable. Continue adderall XR 20 mg daily and adderall !R 15 mg daily as needed, has been taking around 1 pm on a daily basis. Refills provided.   - amphetamine-dextroamphetamine (ADDERALL XR) 20 MG 24 hr capsule; Take 1 capsule (20 mg) by mouth daily.  - amphetamine-dextroamphetamine (ADDERALL XR) 20 MG 24 hr capsule; Take 1 capsule (20 mg) by mouth daily.  - amphetamine-dextroamphetamine (ADDERALL XR) 20 MG 24 hr capsule; Take 1 capsule (20 mg) by mouth daily.  - amphetamine-dextroamphetamine (ADDERALL) 15 MG tablet; Take 1 tablet (15 mg) by mouth daily at 2 pm.  - amphetamine-dextroamphetamine (ADDERALL) 15 MG tablet; Take 1 tablet (15 mg) by mouth daily at 2 pm.  - amphetamine-dextroamphetamine (ADDERALL) 15 MG tablet; Take 1 tablet (15 mg) by mouth daily at 2 pm.    Type 2 diabetes mellitus without complication, without long-term current use of insulin (H)  Morbid obesity (H)  Has continued to lose weight while being conscious of her nutrition. Current weight is 200 lb. Tolerating mounjaro 12.5 mg weekly.   Started insulin, ozempic, metformin 6/2021. Weight 331.  Started phentermine 8/2022.   Switched from ozempic to mounjaro 4/2023.   Stopped phentermine 2/2024  Continues on mounjaro 12.5 mg weekly.   Current weight: 200  Goal weight: 180        BMI  Estimated body mass index is 32.84 kg/m  as calculated from the following:    Height as of 2/25/25: 1.727 m (5' 8\").    Weight as of 5/5/25: 98 kg (216 lb).   Weight management plan: Discussed healthy diet and exercise guidelines    The longitudinal plan of care for the diagnosis(es)/condition(s) as " "documented were addressed during this visit. Due to the added complexity in care, I will continue to support Geni in the subsequent management and with ongoing continuity of care.    Subjective   Geni is a 44 year old, presenting for the following health issues:  Recheck Medication    HPI      Presents today for follow-up.     #depression  #anxiety  Increased fluoxetine to 60 mg a couple weeks. Has found that she is already feeling \"lighter.\" Was let go from her job in March, started a new job only a week later at [employer] ([employer] [facility]). Her new job has been going well. 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     #obesity  #DM2  Doing well on mounjaro 12.5 mg. Had been on 15 mg for a period of time but interestingly felt like the food noise was back, eating more. Wasn't weighing herself over the winter but felt as though she was in a plateau. Then around the time she switched jobs she weighed herself and realized she had lost 30 pounds from the last time she weighed herself. Continues to be intentional about protein intake and nutrition.     Started insulin, ozempic, metformin 6/2021. Weight 331.  Started phentermine 8/2022.   Switched from ozempic to mounjaro 4/2023.   Stopped phentermine 2/2024  Continues on mounjaro 12.5 mg weekly.   Current weight: 200  Goal weight: 180    Wt Readings from Last 10 Encounters:   05/05/25 98 kg (216 lb)   02/25/25 98.9 kg (218 lb)   11/04/24 107.6 kg (237 lb 3.2 oz)   09/30/24 109.5 kg (241 lb 4.8 oz)   07/26/24 110.9 kg (244 lb 6.4 oz)   06/20/24 109.3 kg (241 lb)   06/14/24 109.5 kg (241 lb 6.4 oz)   05/21/24 109.3 kg (241 lb)   04/15/24 113.7 kg (250 lb 11.2 oz)   01/09/24 115.8 kg (255 lb 4.8 oz)   8/31/2022 320 lbs        Objective       Vitals:  No vitals were obtained today due to virtual visit.    Physical Exam   GENERAL: alert and no distress  EYES: Eyes grossly normal to inspection.  No discharge or erythema, or obvious " scleral/conjunctival abnormalities.  RESP: No audible wheeze, cough, or visible cyanosis.    SKIN: Visible skin clear. No significant rash, abnormal pigmentation or lesions.  NEURO: Cranial nerves grossly intact.  Mentation and speech appropriate for age.  PSYCH: Appropriate affect, tone, and pace of words          Video-Visit Details    Type of service:  Video Visit   Originating Location (pt. Location): Home    Distant Location (provider location):  Off-site  Platform used for Video Visit: Sol[employer]ario  Signed Electronically by: CHUY Monge CNP

## 2025-07-14 ENCOUNTER — MYC REFILL (OUTPATIENT)
Dept: PEDIATRICS | Facility: CLINIC | Age: 45
End: 2025-07-14
Payer: COMMERCIAL

## 2025-07-14 DIAGNOSIS — E11.9 TYPE 2 DIABETES MELLITUS WITHOUT COMPLICATION, WITHOUT LONG-TERM CURRENT USE OF INSULIN (H): ICD-10-CM

## 2025-07-14 DIAGNOSIS — E66.01 MORBID OBESITY (H): ICD-10-CM

## 2025-07-15 RX ORDER — TIRZEPATIDE 12.5 MG/.5ML
12.5 INJECTION, SOLUTION SUBCUTANEOUS WEEKLY
Qty: 6 ML | Refills: 0 | Status: SHIPPED | OUTPATIENT
Start: 2025-07-15

## 2025-08-08 ENCOUNTER — RESULTS FOLLOW-UP (OUTPATIENT)
Dept: PEDIATRICS | Facility: CLINIC | Age: 45
End: 2025-08-08

## 2025-08-08 DIAGNOSIS — R87.810 CERVICAL HIGH RISK HPV (HUMAN PAPILLOMAVIRUS) TEST POSITIVE: ICD-10-CM

## 2025-08-08 DIAGNOSIS — F90.0 ATTENTION DEFICIT HYPERACTIVITY DISORDER (ADHD), PREDOMINANTLY INATTENTIVE TYPE: Primary | ICD-10-CM

## 2025-08-08 PROBLEM — F33.1 MODERATE RECURRENT MAJOR DEPRESSION (H): Status: ACTIVE | Noted: 2025-08-08

## 2025-08-08 PROBLEM — J45.50 SEVERE PERSISTENT ASTHMA WITHOUT COMPLICATION (H): Status: ACTIVE | Noted: 2025-08-08

## 2025-08-08 PROBLEM — J45.50 SEVERE PERSISTENT ASTHMA WITHOUT COMPLICATION (H): Status: RESOLVED | Noted: 2025-08-08 | Resolved: 2025-08-08

## 2025-08-08 PROBLEM — F33.0 MILD RECURRENT MAJOR DEPRESSION: Status: RESOLVED | Noted: 2020-01-20 | Resolved: 2025-08-08

## 2025-08-08 PROBLEM — E66.01 MORBID OBESITY (H): Status: RESOLVED | Noted: 2021-01-21 | Resolved: 2025-08-08

## 2025-08-17 ENCOUNTER — MYC REFILL (OUTPATIENT)
Dept: PEDIATRICS | Facility: CLINIC | Age: 45
End: 2025-08-17
Payer: COMMERCIAL

## 2025-08-17 DIAGNOSIS — F33.0 MILD RECURRENT MAJOR DEPRESSION: ICD-10-CM

## 2025-08-17 DIAGNOSIS — F90.0 ATTENTION DEFICIT HYPERACTIVITY DISORDER (ADHD), PREDOMINANTLY INATTENTIVE TYPE: ICD-10-CM

## 2025-08-17 DIAGNOSIS — F41.1 GAD (GENERALIZED ANXIETY DISORDER): ICD-10-CM

## 2025-08-17 RX ORDER — DEXTROAMPHETAMINE SACCHARATE, AMPHETAMINE ASPARTATE MONOHYDRATE, DEXTROAMPHETAMINE SULFATE AND AMPHETAMINE SULFATE 5; 5; 5; 5 MG/1; MG/1; MG/1; MG/1
20 CAPSULE, EXTENDED RELEASE ORAL DAILY
Qty: 30 CAPSULE | Refills: 0 | Status: CANCELLED | OUTPATIENT
Start: 2025-08-17

## 2025-08-18 RX ORDER — DEXTROAMPHETAMINE SACCHARATE, AMPHETAMINE ASPARTATE MONOHYDRATE, DEXTROAMPHETAMINE SULFATE AND AMPHETAMINE SULFATE 5; 5; 5; 5 MG/1; MG/1; MG/1; MG/1
20 CAPSULE, EXTENDED RELEASE ORAL DAILY
Qty: 30 CAPSULE | Refills: 0 | Status: SHIPPED | OUTPATIENT
Start: 2025-10-17 | End: 2025-11-16

## 2025-08-18 RX ORDER — DEXTROAMPHETAMINE SACCHARATE, AMPHETAMINE ASPARTATE MONOHYDRATE, DEXTROAMPHETAMINE SULFATE AND AMPHETAMINE SULFATE 5; 5; 5; 5 MG/1; MG/1; MG/1; MG/1
20 CAPSULE, EXTENDED RELEASE ORAL DAILY
Qty: 30 CAPSULE | Refills: 0 | Status: SHIPPED | OUTPATIENT
Start: 2025-08-18 | End: 2025-09-17

## 2025-08-18 RX ORDER — DEXTROAMPHETAMINE SACCHARATE, AMPHETAMINE ASPARTATE MONOHYDRATE, DEXTROAMPHETAMINE SULFATE AND AMPHETAMINE SULFATE 5; 5; 5; 5 MG/1; MG/1; MG/1; MG/1
20 CAPSULE, EXTENDED RELEASE ORAL DAILY
Qty: 30 CAPSULE | Refills: 0 | Status: SHIPPED | OUTPATIENT
Start: 2025-09-17 | End: 2025-10-17